# Patient Record
Sex: FEMALE | Race: WHITE | HISPANIC OR LATINO
[De-identification: names, ages, dates, MRNs, and addresses within clinical notes are randomized per-mention and may not be internally consistent; named-entity substitution may affect disease eponyms.]

---

## 2017-02-08 ENCOUNTER — APPOINTMENT (OUTPATIENT)
Dept: PODIATRY | Facility: CLINIC | Age: 77
End: 2017-02-08

## 2017-02-14 ENCOUNTER — APPOINTMENT (OUTPATIENT)
Dept: PODIATRY | Facility: CLINIC | Age: 77
End: 2017-02-14

## 2017-02-14 ENCOUNTER — OUTPATIENT (OUTPATIENT)
Dept: OUTPATIENT SERVICES | Facility: HOSPITAL | Age: 77
LOS: 1 days | Discharge: HOME | End: 2017-02-14

## 2017-02-14 VITALS — BODY MASS INDEX: 51.53 KG/M2 | WEIGHT: 280 LBS | HEIGHT: 62 IN

## 2017-02-14 DIAGNOSIS — L29.9 PRURITUS, UNSPECIFIED: ICD-10-CM

## 2017-02-14 DIAGNOSIS — L03.119 CELLULITIS OF UNSPECIFIED PART OF LIMB: ICD-10-CM

## 2017-02-22 ENCOUNTER — APPOINTMENT (OUTPATIENT)
Dept: PODIATRY | Facility: CLINIC | Age: 77
End: 2017-02-22

## 2017-03-08 ENCOUNTER — APPOINTMENT (OUTPATIENT)
Dept: PODIATRY | Facility: CLINIC | Age: 77
End: 2017-03-08

## 2017-03-29 ENCOUNTER — APPOINTMENT (OUTPATIENT)
Dept: PODIATRY | Facility: CLINIC | Age: 77
End: 2017-03-29

## 2017-04-03 ENCOUNTER — APPOINTMENT (OUTPATIENT)
Dept: PODIATRY | Facility: CLINIC | Age: 77
End: 2017-04-03

## 2017-04-03 VITALS
SYSTOLIC BLOOD PRESSURE: 120 MMHG | BODY MASS INDEX: 28.32 KG/M2 | HEIGHT: 65 IN | WEIGHT: 170 LBS | HEART RATE: 70 BPM | DIASTOLIC BLOOD PRESSURE: 88 MMHG

## 2017-04-17 ENCOUNTER — APPOINTMENT (OUTPATIENT)
Dept: PODIATRY | Facility: CLINIC | Age: 77
End: 2017-04-17

## 2017-04-24 ENCOUNTER — APPOINTMENT (OUTPATIENT)
Dept: INTERNAL MEDICINE | Facility: CLINIC | Age: 77
End: 2017-04-24

## 2017-04-24 VITALS
WEIGHT: 280 LBS | DIASTOLIC BLOOD PRESSURE: 85 MMHG | HEIGHT: 62 IN | SYSTOLIC BLOOD PRESSURE: 139 MMHG | HEART RATE: 60 BPM | BODY MASS INDEX: 51.53 KG/M2

## 2017-04-24 RX ORDER — CEPHALEXIN 500 MG/1
500 CAPSULE ORAL
Qty: 20 | Refills: 0 | Status: DISCONTINUED | COMMUNITY
Start: 2017-02-08 | End: 2017-04-24

## 2017-05-09 ENCOUNTER — APPOINTMENT (OUTPATIENT)
Dept: CARDIOLOGY | Facility: CLINIC | Age: 77
End: 2017-05-09

## 2017-05-09 VITALS — HEART RATE: 70 BPM | DIASTOLIC BLOOD PRESSURE: 70 MMHG | OXYGEN SATURATION: 97 % | SYSTOLIC BLOOD PRESSURE: 110 MMHG

## 2017-06-08 ENCOUNTER — APPOINTMENT (OUTPATIENT)
Dept: GERIATRICS | Facility: CLINIC | Age: 77
End: 2017-06-08

## 2017-06-08 VITALS
BODY MASS INDEX: 53.73 KG/M2 | WEIGHT: 292 LBS | HEIGHT: 62 IN | SYSTOLIC BLOOD PRESSURE: 147 MMHG | DIASTOLIC BLOOD PRESSURE: 67 MMHG | HEART RATE: 58 BPM

## 2017-06-08 DIAGNOSIS — R06.01 ORTHOPNEA: ICD-10-CM

## 2017-06-12 ENCOUNTER — APPOINTMENT (OUTPATIENT)
Dept: PODIATRY | Facility: CLINIC | Age: 77
End: 2017-06-12

## 2017-06-12 ENCOUNTER — OUTPATIENT (OUTPATIENT)
Dept: OUTPATIENT SERVICES | Facility: HOSPITAL | Age: 77
LOS: 1 days | Discharge: HOME | End: 2017-06-12

## 2017-06-12 VITALS
SYSTOLIC BLOOD PRESSURE: 140 MMHG | BODY MASS INDEX: 45 KG/M2 | DIASTOLIC BLOOD PRESSURE: 70 MMHG | HEIGHT: 66 IN | HEART RATE: 78 BPM | WEIGHT: 280 LBS

## 2017-06-12 DIAGNOSIS — I50.9 HEART FAILURE, UNSPECIFIED: ICD-10-CM

## 2017-06-12 RX ORDER — SILVER SULFADIAZINE 10 MG/G
1 CREAM TOPICAL TWICE DAILY
Qty: 50 | Refills: 1 | Status: COMPLETED | COMMUNITY
Start: 2017-06-12

## 2017-06-28 DIAGNOSIS — L97.519 NON-PRESSURE CHRONIC ULCER OF OTHER PART OF RIGHT FOOT WITH UNSPECIFIED SEVERITY: ICD-10-CM

## 2017-07-11 ENCOUNTER — APPOINTMENT (OUTPATIENT)
Dept: PULMONOLOGY | Facility: CLINIC | Age: 77
End: 2017-07-11

## 2017-07-11 VITALS
DIASTOLIC BLOOD PRESSURE: 70 MMHG | OXYGEN SATURATION: 98 % | HEART RATE: 60 BPM | RESPIRATION RATE: 18 BRPM | HEIGHT: 66 IN | SYSTOLIC BLOOD PRESSURE: 118 MMHG

## 2017-07-11 VITALS — WEIGHT: 292.8 LBS | HEIGHT: 62 IN | BODY MASS INDEX: 53.88 KG/M2

## 2017-07-12 ENCOUNTER — OUTPATIENT (OUTPATIENT)
Dept: OUTPATIENT SERVICES | Facility: HOSPITAL | Age: 77
LOS: 1 days | Discharge: HOME | End: 2017-07-12

## 2017-07-12 DIAGNOSIS — I50.9 HEART FAILURE, UNSPECIFIED: ICD-10-CM

## 2017-07-13 ENCOUNTER — OUTPATIENT (OUTPATIENT)
Dept: OUTPATIENT SERVICES | Facility: HOSPITAL | Age: 77
LOS: 1 days | Discharge: HOME | End: 2017-07-13

## 2017-07-13 ENCOUNTER — APPOINTMENT (OUTPATIENT)
Dept: GERIATRICS | Facility: CLINIC | Age: 77
End: 2017-07-13

## 2017-07-13 VITALS
DIASTOLIC BLOOD PRESSURE: 65 MMHG | HEART RATE: 69 BPM | SYSTOLIC BLOOD PRESSURE: 129 MMHG | WEIGHT: 293 LBS | BODY MASS INDEX: 53.92 KG/M2 | HEIGHT: 62 IN

## 2017-07-13 DIAGNOSIS — E78.00 PURE HYPERCHOLESTEROLEMIA, UNSPECIFIED: ICD-10-CM

## 2017-07-13 DIAGNOSIS — E66.9 OBESITY, UNSPECIFIED: ICD-10-CM

## 2017-07-13 DIAGNOSIS — I50.9 HEART FAILURE, UNSPECIFIED: ICD-10-CM

## 2017-07-13 DIAGNOSIS — G47.33 OBSTRUCTIVE SLEEP APNEA (ADULT) (PEDIATRIC): ICD-10-CM

## 2017-07-13 DIAGNOSIS — I10 ESSENTIAL (PRIMARY) HYPERTENSION: ICD-10-CM

## 2017-07-13 DIAGNOSIS — I50.32 CHRONIC DIASTOLIC (CONGESTIVE) HEART FAILURE: ICD-10-CM

## 2017-07-14 ENCOUNTER — OUTPATIENT (OUTPATIENT)
Dept: OUTPATIENT SERVICES | Facility: HOSPITAL | Age: 77
LOS: 1 days | Discharge: HOME | End: 2017-07-14

## 2017-07-14 DIAGNOSIS — I50.9 HEART FAILURE, UNSPECIFIED: ICD-10-CM

## 2017-07-18 ENCOUNTER — OUTPATIENT (OUTPATIENT)
Dept: OUTPATIENT SERVICES | Facility: HOSPITAL | Age: 77
LOS: 1 days | Discharge: HOME | End: 2017-07-18

## 2017-07-18 DIAGNOSIS — I50.9 HEART FAILURE, UNSPECIFIED: ICD-10-CM

## 2017-08-04 ENCOUNTER — APPOINTMENT (OUTPATIENT)
Dept: PULMONOLOGY | Facility: CLINIC | Age: 77
End: 2017-08-04

## 2017-08-04 ENCOUNTER — OUTPATIENT (OUTPATIENT)
Dept: OUTPATIENT SERVICES | Facility: HOSPITAL | Age: 77
LOS: 1 days | Discharge: HOME | End: 2017-08-04

## 2017-08-04 VITALS
DIASTOLIC BLOOD PRESSURE: 60 MMHG | HEART RATE: 71 BPM | WEIGHT: 293 LBS | SYSTOLIC BLOOD PRESSURE: 121 MMHG | BODY MASS INDEX: 53.92 KG/M2 | HEIGHT: 62 IN

## 2017-08-04 DIAGNOSIS — I50.9 HEART FAILURE, UNSPECIFIED: ICD-10-CM

## 2017-08-15 ENCOUNTER — APPOINTMENT (OUTPATIENT)
Dept: NUTRITION | Facility: CLINIC | Age: 77
End: 2017-08-15

## 2017-09-12 ENCOUNTER — OUTPATIENT (OUTPATIENT)
Dept: OUTPATIENT SERVICES | Facility: HOSPITAL | Age: 77
LOS: 1 days | Discharge: HOME | End: 2017-09-12

## 2017-09-12 ENCOUNTER — APPOINTMENT (OUTPATIENT)
Dept: CARDIOLOGY | Facility: CLINIC | Age: 77
End: 2017-09-12

## 2017-09-12 VITALS
HEART RATE: 66 BPM | WEIGHT: 292 LBS | HEIGHT: 62 IN | DIASTOLIC BLOOD PRESSURE: 85 MMHG | SYSTOLIC BLOOD PRESSURE: 130 MMHG | BODY MASS INDEX: 53.73 KG/M2

## 2017-09-12 DIAGNOSIS — I50.9 HEART FAILURE, UNSPECIFIED: ICD-10-CM

## 2017-09-14 ENCOUNTER — OUTPATIENT (OUTPATIENT)
Dept: OUTPATIENT SERVICES | Facility: HOSPITAL | Age: 77
LOS: 1 days | Discharge: HOME | End: 2017-09-14

## 2017-09-14 DIAGNOSIS — I50.9 HEART FAILURE, UNSPECIFIED: ICD-10-CM

## 2017-09-15 DIAGNOSIS — G47.33 OBSTRUCTIVE SLEEP APNEA (ADULT) (PEDIATRIC): ICD-10-CM

## 2017-10-12 ENCOUNTER — APPOINTMENT (OUTPATIENT)
Dept: GERIATRICS | Facility: CLINIC | Age: 77
End: 2017-10-12

## 2017-11-07 ENCOUNTER — APPOINTMENT (OUTPATIENT)
Dept: CARDIOLOGY | Facility: CLINIC | Age: 77
End: 2017-11-07

## 2017-11-24 ENCOUNTER — RESULT REVIEW (OUTPATIENT)
Age: 77
End: 2017-11-24

## 2017-11-28 LAB
ALBUMIN SERPL-MCNC: 3.6 G/DL
ALBUMIN/GLOB SERPL: 1.16
ALP SERPL-CCNC: 116 IU/L
ALT SERPL-CCNC: 16 IU/L
ANION GAP SERPL CALC-SCNC: 9 MEQ/L
AST SERPL-CCNC: 17 IU/L
BASOPHILS # BLD: 0.03 TH/MM3
BASOPHILS NFR BLD: 0.4 %
BILIRUB SERPL-MCNC: 0.5 MG/DL
BUN SERPL-MCNC: 38 MG/DL
BUN/CREAT SERPL: 30.9 %
CALCIUM SERPL-MCNC: 9.5 MG/DL
CHLORIDE SERPL-SCNC: 108 MEQ/L
CHOLEST SERPL-MCNC: 124 MG/DL
CO2 SERPL-SCNC: 27 MEQ/L
CREAT SERPL-MCNC: 1.23 MG/DL
DIFFERENTIAL METHOD BLD: NORMAL
EOSINOPHIL # BLD: 0.25 TH/MM3
EOSINOPHIL NFR BLD: 3 %
ERYTHROCYTE [DISTWIDTH] IN BLOOD BY AUTOMATED COUNT: 16.3 %
ESTIMATED AVERGAGE GLUCOSE (NORTH): 131 MG/DL
GFR SERPL CREATININE-BSD FRML MDRD: 42
GLUCOSE SERPL-MCNC: 109 MG/DL
GRANULOCYTES # BLD: 5.35 TH/MM3
GRANULOCYTES NFR BLD: 65 %
HBA1C MFR BLD: 6.2 %
HCT VFR BLD AUTO: 41.4 %
HDLC SERPL-MCNC: 49 MG/DL
HDLC SERPL: 2.53
HGB BLD-MCNC: 12.9 G/DL
IMM GRANULOCYTES # BLD: 0.02 TH/MM3
IMM GRANULOCYTES NFR BLD: 0.2 %
LDLC SERPL DIRECT ASSAY-MCNC: 56 MG/DL
LYMPHOCYTES # BLD: 2.06 TH/MM3
LYMPHOCYTES NFR BLD: 25 %
MCH RBC QN AUTO: 28.2 PG
MCHC RBC AUTO-ENTMCNC: 31.2 G/DL
MCV RBC AUTO: 90.4 FL
MONOCYTES # BLD: 0.53 TH/MM3
MONOCYTES NFR BLD: 6.4 %
PLATELET # BLD: 241 TH/MM3
PMV BLD AUTO: 11.5 FL
POTASSIUM SERPL-SCNC: 4.2 MMOL/L
PROT SERPL-MCNC: 6.7 G/DL
RBC # BLD AUTO: 4.58 MIL/MM3
SODIUM SERPL-SCNC: 144 MEQ/L
TRIGL SERPL-MCNC: 110 MG/DL
VLDLC SERPL-MCNC: 22 MG/DL
WBC # BLD: 8.24 TH/MM3

## 2017-12-05 ENCOUNTER — APPOINTMENT (OUTPATIENT)
Dept: CARDIOLOGY | Facility: CLINIC | Age: 77
End: 2017-12-05

## 2017-12-05 ENCOUNTER — OUTPATIENT (OUTPATIENT)
Dept: OUTPATIENT SERVICES | Facility: HOSPITAL | Age: 77
LOS: 1 days | Discharge: HOME | End: 2017-12-05

## 2017-12-05 VITALS
HEIGHT: 62 IN | HEART RATE: 77 BPM | SYSTOLIC BLOOD PRESSURE: 118 MMHG | BODY MASS INDEX: 53.73 KG/M2 | WEIGHT: 292 LBS | OXYGEN SATURATION: 98 % | DIASTOLIC BLOOD PRESSURE: 80 MMHG

## 2017-12-05 DIAGNOSIS — E78.2 MIXED HYPERLIPIDEMIA: ICD-10-CM

## 2017-12-05 DIAGNOSIS — I50.32 CHRONIC DIASTOLIC (CONGESTIVE) HEART FAILURE: ICD-10-CM

## 2017-12-05 DIAGNOSIS — I50.9 HEART FAILURE, UNSPECIFIED: ICD-10-CM

## 2017-12-07 ENCOUNTER — OUTPATIENT (OUTPATIENT)
Dept: OUTPATIENT SERVICES | Facility: HOSPITAL | Age: 77
LOS: 1 days | Discharge: HOME | End: 2017-12-07

## 2017-12-07 DIAGNOSIS — I50.9 HEART FAILURE, UNSPECIFIED: ICD-10-CM

## 2017-12-07 DIAGNOSIS — I50.32 CHRONIC DIASTOLIC (CONGESTIVE) HEART FAILURE: ICD-10-CM

## 2017-12-20 LAB — CYTOLOGY CVX/VAG DOC THIN PREP: NORMAL

## 2017-12-27 ENCOUNTER — APPOINTMENT (OUTPATIENT)
Dept: GERIATRICS | Facility: CLINIC | Age: 77
End: 2017-12-27

## 2018-01-10 DIAGNOSIS — M79.671 PAIN IN RIGHT FOOT: ICD-10-CM

## 2018-01-10 DIAGNOSIS — L97.519 NON-PRESSURE CHRONIC ULCER OF OTHER PART OF RIGHT FOOT WITH UNSPECIFIED SEVERITY: ICD-10-CM

## 2018-01-10 DIAGNOSIS — R60.0 LOCALIZED EDEMA: ICD-10-CM

## 2018-05-01 ENCOUNTER — OUTPATIENT (OUTPATIENT)
Dept: OUTPATIENT SERVICES | Facility: HOSPITAL | Age: 78
LOS: 1 days | Discharge: HOME | End: 2018-05-01

## 2018-05-01 DIAGNOSIS — H25.13 AGE-RELATED NUCLEAR CATARACT, BILATERAL: ICD-10-CM

## 2018-05-03 ENCOUNTER — APPOINTMENT (OUTPATIENT)
Dept: GERIATRICS | Facility: CLINIC | Age: 78
End: 2018-05-03

## 2018-05-03 ENCOUNTER — OUTPATIENT (OUTPATIENT)
Dept: OUTPATIENT SERVICES | Facility: HOSPITAL | Age: 78
LOS: 1 days | Discharge: HOME | End: 2018-05-03

## 2018-05-03 VITALS
WEIGHT: 293 LBS | HEART RATE: 66 BPM | BODY MASS INDEX: 53.92 KG/M2 | HEIGHT: 62 IN | DIASTOLIC BLOOD PRESSURE: 70 MMHG | SYSTOLIC BLOOD PRESSURE: 137 MMHG

## 2018-05-03 VITALS — TEMPERATURE: 97.9 F

## 2018-05-03 DIAGNOSIS — R32 UNSPECIFIED URINARY INCONTINENCE: ICD-10-CM

## 2018-05-03 RX ORDER — SILVER SULFADIAZINE 10 MG/G
1 CREAM TOPICAL TWICE DAILY
Qty: 5 | Refills: 0 | Status: DISCONTINUED | COMMUNITY
Start: 2017-04-24 | End: 2018-05-03

## 2018-05-07 DIAGNOSIS — R06.09 OTHER FORMS OF DYSPNEA: ICD-10-CM

## 2018-05-07 DIAGNOSIS — R06.02 SHORTNESS OF BREATH: ICD-10-CM

## 2018-05-07 DIAGNOSIS — I50.31 ACUTE DIASTOLIC (CONGESTIVE) HEART FAILURE: ICD-10-CM

## 2018-05-07 DIAGNOSIS — M19.90 UNSPECIFIED OSTEOARTHRITIS, UNSPECIFIED SITE: ICD-10-CM

## 2018-05-12 ENCOUNTER — LABORATORY RESULT (OUTPATIENT)
Age: 78
End: 2018-05-12

## 2018-05-15 ENCOUNTER — APPOINTMENT (OUTPATIENT)
Dept: CARDIOLOGY | Facility: CLINIC | Age: 78
End: 2018-05-15

## 2018-05-15 ENCOUNTER — OUTPATIENT (OUTPATIENT)
Dept: OUTPATIENT SERVICES | Facility: HOSPITAL | Age: 78
LOS: 1 days | Discharge: HOME | End: 2018-05-15

## 2018-05-15 VITALS
HEART RATE: 72 BPM | WEIGHT: 293 LBS | DIASTOLIC BLOOD PRESSURE: 78 MMHG | BODY MASS INDEX: 53.92 KG/M2 | HEIGHT: 62 IN | SYSTOLIC BLOOD PRESSURE: 120 MMHG | OXYGEN SATURATION: 99 %

## 2018-05-15 RX ORDER — ATORVASTATIN CALCIUM 20 MG/1
20 TABLET, FILM COATED ORAL
Qty: 90 | Refills: 3 | Status: DISCONTINUED | COMMUNITY
Start: 2017-07-13 | End: 2018-05-15

## 2018-05-16 LAB
ANION GAP SERPL CALC-SCNC: 14 MMOL/L
BASOPHILS # BLD AUTO: 0.03 K/UL
BASOPHILS NFR BLD AUTO: 0.5 %
BUN SERPL-MCNC: 32 MG/DL
CALCIUM SERPL-MCNC: 9 MG/DL
CHLORIDE SERPL-SCNC: 102 MMOL/L
CHOLEST SERPL-MCNC: 149 MG/DL
CHOLEST/HDLC SERPL: 2.6 RATIO
CO2 SERPL-SCNC: 25 MMOL/L
CREAT SERPL-MCNC: 1.1 MG/DL
EOSINOPHIL # BLD AUTO: 0.18 K/UL
EOSINOPHIL NFR BLD AUTO: 2.9 %
GLUCOSE SERPL-MCNC: 85 MG/DL
HBA1C MFR BLD HPLC: 6 %
HCT VFR BLD CALC: 39.2 %
HDLC SERPL-MCNC: 58 MG/DL
HGB BLD-MCNC: 11.9 G/DL
IMM GRANULOCYTES NFR BLD AUTO: 0.2 %
LDLC SERPL CALC-MCNC: 72 MG/DL
LYMPHOCYTES # BLD AUTO: 1.39 K/UL
LYMPHOCYTES NFR BLD AUTO: 22.4 %
MAN DIFF?: NORMAL
MCHC RBC-ENTMCNC: 27.4 PG
MCHC RBC-ENTMCNC: 30.4 G/DL
MCV RBC AUTO: 90.3 FL
MONOCYTES # BLD AUTO: 0.45 K/UL
MONOCYTES NFR BLD AUTO: 7.3 %
NEUTROPHILS # BLD AUTO: 4.14 K/UL
NEUTROPHILS NFR BLD AUTO: 66.7 %
PLATELET # BLD AUTO: 237 K/UL
POTASSIUM SERPL-SCNC: 4.6 MMOL/L
RBC # BLD: 4.34 M/UL
RBC # FLD: 17.5 %
SODIUM SERPL-SCNC: 141 MMOL/L
TRIGL SERPL-MCNC: 120 MG/DL
TSH SERPL-ACNC: 11.04 UIU/ML
WBC # FLD AUTO: 6.2 K/UL

## 2018-06-23 ENCOUNTER — LABORATORY RESULT (OUTPATIENT)
Age: 78
End: 2018-06-23

## 2018-07-02 ENCOUNTER — TRANSCRIPTION ENCOUNTER (OUTPATIENT)
Age: 78
End: 2018-07-02

## 2018-07-05 LAB
T3REVERSE SERPL-MCNC: 16.8 NG/DL
T4BG SERPL-MCNC: 20 UG/ML
THYROPEROXIDASE AB SERPL IA-ACNC: 8042 IU/ML
TSH SERPL-ACNC: 10.8 UIU/ML

## 2018-07-09 ENCOUNTER — APPOINTMENT (OUTPATIENT)
Dept: PODIATRY | Facility: CLINIC | Age: 78
End: 2018-07-09
Payer: MEDICAID

## 2018-07-09 ENCOUNTER — OUTPATIENT (OUTPATIENT)
Dept: OUTPATIENT SERVICES | Facility: HOSPITAL | Age: 78
LOS: 1 days | Discharge: HOME | End: 2018-07-09

## 2018-07-09 VITALS
SYSTOLIC BLOOD PRESSURE: 128 MMHG | WEIGHT: 293 LBS | DIASTOLIC BLOOD PRESSURE: 73 MMHG | RESPIRATION RATE: 20 BRPM | BODY MASS INDEX: 53.92 KG/M2 | HEIGHT: 62 IN | HEART RATE: 65 BPM

## 2018-07-09 PROCEDURE — 99213 OFFICE O/P EST LOW 20 MIN: CPT | Mod: NC

## 2018-07-17 ENCOUNTER — OUTPATIENT (OUTPATIENT)
Dept: OUTPATIENT SERVICES | Facility: HOSPITAL | Age: 78
LOS: 1 days | Discharge: HOME | End: 2018-07-17

## 2018-07-17 ENCOUNTER — APPOINTMENT (OUTPATIENT)
Dept: BURN CARE | Facility: CLINIC | Age: 78
End: 2018-07-17

## 2018-07-23 ENCOUNTER — APPOINTMENT (OUTPATIENT)
Dept: VASCULAR SURGERY | Facility: CLINIC | Age: 78
End: 2018-07-23
Payer: MEDICAID

## 2018-07-23 VITALS
HEIGHT: 61 IN | BODY MASS INDEX: 55.32 KG/M2 | SYSTOLIC BLOOD PRESSURE: 130 MMHG | WEIGHT: 293 LBS | DIASTOLIC BLOOD PRESSURE: 82 MMHG

## 2018-07-23 PROCEDURE — 99203 OFFICE O/P NEW LOW 30 MIN: CPT

## 2018-07-25 DIAGNOSIS — M79.672 PAIN IN LEFT FOOT: ICD-10-CM

## 2018-07-25 DIAGNOSIS — M79.671 PAIN IN RIGHT FOOT: ICD-10-CM

## 2018-07-25 DIAGNOSIS — I87.2 VENOUS INSUFFICIENCY (CHRONIC) (PERIPHERAL): ICD-10-CM

## 2018-07-30 ENCOUNTER — APPOINTMENT (OUTPATIENT)
Dept: VASCULAR SURGERY | Facility: CLINIC | Age: 78
End: 2018-07-30
Payer: MEDICAID

## 2018-07-30 PROCEDURE — 29580 STRAPPING UNNA BOOT: CPT

## 2018-08-02 ENCOUNTER — APPOINTMENT (OUTPATIENT)
Dept: GERIATRICS | Facility: CLINIC | Age: 78
End: 2018-08-02

## 2018-08-02 ENCOUNTER — OUTPATIENT (OUTPATIENT)
Dept: OUTPATIENT SERVICES | Facility: HOSPITAL | Age: 78
LOS: 1 days | Discharge: HOME | End: 2018-08-02

## 2018-08-02 VITALS
DIASTOLIC BLOOD PRESSURE: 83 MMHG | TEMPERATURE: 97.3 F | HEIGHT: 61 IN | WEIGHT: 293 LBS | HEART RATE: 60 BPM | SYSTOLIC BLOOD PRESSURE: 124 MMHG | BODY MASS INDEX: 55.32 KG/M2

## 2018-08-06 ENCOUNTER — APPOINTMENT (OUTPATIENT)
Dept: VASCULAR SURGERY | Facility: CLINIC | Age: 78
End: 2018-08-06
Payer: MEDICAID

## 2018-08-06 ENCOUNTER — APPOINTMENT (OUTPATIENT)
Dept: PODIATRY | Facility: CLINIC | Age: 78
End: 2018-08-06
Payer: MEDICAID

## 2018-08-06 ENCOUNTER — OUTPATIENT (OUTPATIENT)
Dept: OUTPATIENT SERVICES | Facility: HOSPITAL | Age: 78
LOS: 1 days | Discharge: HOME | End: 2018-08-06

## 2018-08-06 VITALS
HEART RATE: 71 BPM | DIASTOLIC BLOOD PRESSURE: 75 MMHG | BODY MASS INDEX: 55.32 KG/M2 | SYSTOLIC BLOOD PRESSURE: 130 MMHG | HEIGHT: 61 IN | WEIGHT: 293 LBS

## 2018-08-06 PROCEDURE — 29580 STRAPPING UNNA BOOT: CPT | Mod: NC

## 2018-08-06 PROCEDURE — 99212 OFFICE O/P EST SF 10 MIN: CPT

## 2018-08-13 ENCOUNTER — APPOINTMENT (OUTPATIENT)
Dept: VASCULAR SURGERY | Facility: CLINIC | Age: 78
End: 2018-08-13

## 2018-08-16 ENCOUNTER — APPOINTMENT (OUTPATIENT)
Dept: PODIATRY | Facility: CLINIC | Age: 78
End: 2018-08-16
Payer: MEDICAID

## 2018-08-16 ENCOUNTER — OUTPATIENT (OUTPATIENT)
Dept: OUTPATIENT SERVICES | Facility: HOSPITAL | Age: 78
LOS: 1 days | Discharge: HOME | End: 2018-08-16

## 2018-08-16 VITALS
DIASTOLIC BLOOD PRESSURE: 83 MMHG | HEIGHT: 61 IN | WEIGHT: 293 LBS | BODY MASS INDEX: 55.32 KG/M2 | HEART RATE: 60 BPM | SYSTOLIC BLOOD PRESSURE: 141 MMHG

## 2018-08-16 PROCEDURE — 29580 STRAPPING UNNA BOOT: CPT | Mod: NC

## 2018-08-23 ENCOUNTER — APPOINTMENT (OUTPATIENT)
Dept: PODIATRY | Facility: CLINIC | Age: 78
End: 2018-08-23
Payer: MEDICAID

## 2018-08-23 ENCOUNTER — OUTPATIENT (OUTPATIENT)
Dept: OUTPATIENT SERVICES | Facility: HOSPITAL | Age: 78
LOS: 1 days | Discharge: HOME | End: 2018-08-23

## 2018-08-23 VITALS
BODY MASS INDEX: 55.32 KG/M2 | HEART RATE: 65 BPM | SYSTOLIC BLOOD PRESSURE: 137 MMHG | DIASTOLIC BLOOD PRESSURE: 90 MMHG | WEIGHT: 293 LBS | HEIGHT: 61 IN

## 2018-08-23 PROCEDURE — 29580 STRAPPING UNNA BOOT: CPT | Mod: NC

## 2018-08-30 ENCOUNTER — OUTPATIENT (OUTPATIENT)
Dept: OUTPATIENT SERVICES | Facility: HOSPITAL | Age: 78
LOS: 1 days | Discharge: HOME | End: 2018-08-30

## 2018-08-30 ENCOUNTER — APPOINTMENT (OUTPATIENT)
Dept: PODIATRY | Facility: CLINIC | Age: 78
End: 2018-08-30
Payer: MEDICAID

## 2018-08-30 VITALS — BODY MASS INDEX: 55.32 KG/M2 | WEIGHT: 293 LBS | HEIGHT: 61 IN

## 2018-08-30 VITALS — HEART RATE: 65 BPM | SYSTOLIC BLOOD PRESSURE: 116 MMHG | DIASTOLIC BLOOD PRESSURE: 80 MMHG

## 2018-08-30 DIAGNOSIS — L85.3 XEROSIS CUTIS: ICD-10-CM

## 2018-08-30 DIAGNOSIS — B35.1 TINEA UNGUIUM: ICD-10-CM

## 2018-08-30 PROCEDURE — 29580 STRAPPING UNNA BOOT: CPT | Mod: NC

## 2018-08-31 PROBLEM — L85.3 XEROSIS OF SKIN: Status: ACTIVE | Noted: 2018-08-30

## 2018-08-31 PROBLEM — B35.1 ONYCHOMYCOSIS: Status: ACTIVE | Noted: 2018-08-30

## 2018-09-06 ENCOUNTER — OUTPATIENT (OUTPATIENT)
Facility: HOSPITAL | Age: 78
LOS: 1 days | Discharge: HOME | End: 2018-09-06

## 2018-09-06 ENCOUNTER — APPOINTMENT (OUTPATIENT)
Dept: PODIATRY | Facility: CLINIC | Age: 78
End: 2018-09-06
Payer: MEDICAID

## 2018-09-06 VITALS
HEART RATE: 71 BPM | WEIGHT: 293 LBS | DIASTOLIC BLOOD PRESSURE: 67 MMHG | SYSTOLIC BLOOD PRESSURE: 130 MMHG | BODY MASS INDEX: 55.32 KG/M2 | HEIGHT: 61 IN

## 2018-09-06 VITALS — BODY MASS INDEX: 55.32 KG/M2 | WEIGHT: 293 LBS | HEIGHT: 61 IN

## 2018-09-06 DIAGNOSIS — I83.013 VARICOSE VEINS OF RIGHT LOWER EXTREMITY WITH ULCER OF ANKLE: ICD-10-CM

## 2018-09-06 DIAGNOSIS — I73.9 PERIPHERAL VASCULAR DISEASE, UNSPECIFIED: ICD-10-CM

## 2018-09-06 DIAGNOSIS — I83.009 VARICOSE VEINS OF UNSPECIFIED LOWER EXTREMITY WITH ULCER OF UNSPECIFIED SITE: ICD-10-CM

## 2018-09-06 DIAGNOSIS — L97.519 NON-PRESSURE CHRONIC ULCER OF OTHER PART OF RIGHT FOOT WITH UNSPECIFIED SEVERITY: ICD-10-CM

## 2018-09-06 DIAGNOSIS — I89.0 LYMPHEDEMA, NOT ELSEWHERE CLASSIFIED: ICD-10-CM

## 2018-09-06 PROCEDURE — 29580 STRAPPING UNNA BOOT: CPT | Mod: NC

## 2018-09-07 DIAGNOSIS — B35.1 TINEA UNGUIUM: ICD-10-CM

## 2018-09-07 DIAGNOSIS — L97.519 NON-PRESSURE CHRONIC ULCER OF OTHER PART OF RIGHT FOOT WITH UNSPECIFIED SEVERITY: ICD-10-CM

## 2018-09-07 DIAGNOSIS — L85.3 XEROSIS CUTIS: ICD-10-CM

## 2018-09-13 ENCOUNTER — OUTPATIENT (OUTPATIENT)
Dept: OUTPATIENT SERVICES | Facility: HOSPITAL | Age: 78
LOS: 1 days | Discharge: HOME | End: 2018-09-13

## 2018-09-13 ENCOUNTER — APPOINTMENT (OUTPATIENT)
Dept: PODIATRY | Facility: CLINIC | Age: 78
End: 2018-09-13
Payer: MEDICAID

## 2018-09-13 VITALS
WEIGHT: 293 LBS | DIASTOLIC BLOOD PRESSURE: 85 MMHG | SYSTOLIC BLOOD PRESSURE: 127 MMHG | BODY MASS INDEX: 55.32 KG/M2 | HEART RATE: 70 BPM | HEIGHT: 61 IN

## 2018-09-13 PROCEDURE — 29580 STRAPPING UNNA BOOT: CPT

## 2018-09-24 DIAGNOSIS — I83.009 VARICOSE VEINS OF UNSPECIFIED LOWER EXTREMITY WITH ULCER OF UNSPECIFIED SITE: ICD-10-CM

## 2018-09-24 DIAGNOSIS — L97.519 NON-PRESSURE CHRONIC ULCER OF OTHER PART OF RIGHT FOOT WITH UNSPECIFIED SEVERITY: ICD-10-CM

## 2018-09-24 DIAGNOSIS — M79.671 PAIN IN RIGHT FOOT: ICD-10-CM

## 2018-09-25 ENCOUNTER — APPOINTMENT (OUTPATIENT)
Dept: PODIATRY | Facility: CLINIC | Age: 78
End: 2018-09-25
Payer: MEDICAID

## 2018-09-25 ENCOUNTER — OUTPATIENT (OUTPATIENT)
Dept: OUTPATIENT SERVICES | Facility: HOSPITAL | Age: 78
LOS: 1 days | Discharge: HOME | End: 2018-09-25

## 2018-09-25 VITALS — WEIGHT: 293 LBS | BODY MASS INDEX: 55.32 KG/M2 | HEIGHT: 61 IN

## 2018-09-25 PROCEDURE — 29580 STRAPPING UNNA BOOT: CPT | Mod: NC

## 2018-09-25 PROCEDURE — 29581 APPL MULTLAYER CMPRN SYS LEG: CPT | Mod: NC,59

## 2018-10-01 ENCOUNTER — APPOINTMENT (OUTPATIENT)
Dept: PODIATRY | Facility: CLINIC | Age: 78
End: 2018-10-01

## 2018-10-01 ENCOUNTER — OUTPATIENT (OUTPATIENT)
Dept: OUTPATIENT SERVICES | Facility: HOSPITAL | Age: 78
LOS: 1 days | Discharge: HOME | End: 2018-10-01

## 2018-10-01 VITALS
SYSTOLIC BLOOD PRESSURE: 70 MMHG | HEART RATE: 89 BPM | DIASTOLIC BLOOD PRESSURE: 45 MMHG | WEIGHT: 293 LBS | BODY MASS INDEX: 55.32 KG/M2 | HEIGHT: 61 IN

## 2018-10-02 DIAGNOSIS — M79.671 PAIN IN RIGHT FOOT: ICD-10-CM

## 2018-10-02 DIAGNOSIS — I83.009 VARICOSE VEINS OF UNSPECIFIED LOWER EXTREMITY WITH ULCER OF UNSPECIFIED SITE: ICD-10-CM

## 2018-10-02 DIAGNOSIS — I73.9 PERIPHERAL VASCULAR DISEASE, UNSPECIFIED: ICD-10-CM

## 2018-10-02 DIAGNOSIS — I83.013 VARICOSE VEINS OF RIGHT LOWER EXTREMITY WITH ULCER OF ANKLE: ICD-10-CM

## 2018-10-02 DIAGNOSIS — L97.519 NON-PRESSURE CHRONIC ULCER OF OTHER PART OF RIGHT FOOT WITH UNSPECIFIED SEVERITY: ICD-10-CM

## 2018-10-04 ENCOUNTER — APPOINTMENT (OUTPATIENT)
Dept: GERIATRICS | Facility: CLINIC | Age: 78
End: 2018-10-04

## 2018-10-04 ENCOUNTER — OUTPATIENT (OUTPATIENT)
Dept: OUTPATIENT SERVICES | Facility: HOSPITAL | Age: 78
LOS: 1 days | Discharge: HOME | End: 2018-10-04

## 2018-10-04 VITALS
DIASTOLIC BLOOD PRESSURE: 78 MMHG | BODY MASS INDEX: 55.32 KG/M2 | HEART RATE: 59 BPM | HEIGHT: 61 IN | WEIGHT: 293 LBS | SYSTOLIC BLOOD PRESSURE: 131 MMHG

## 2018-10-04 DIAGNOSIS — M25.511 PAIN IN RIGHT SHOULDER: ICD-10-CM

## 2018-10-04 RX ORDER — MELOXICAM 15 MG/1
15 TABLET ORAL
Qty: 30 | Refills: 0 | Status: DISCONTINUED | COMMUNITY
Start: 2018-08-16 | End: 2018-10-04

## 2018-10-05 NOTE — ASU PATIENT PROFILE, ADULT - PT NEEDS ASSIST
Was the patient seen in the last year in this department? Yes     Does patient have an active prescription for medications requested? No     Received Request Via: Pharmacy      
no

## 2018-10-08 ENCOUNTER — OUTPATIENT (OUTPATIENT)
Dept: OUTPATIENT SERVICES | Facility: HOSPITAL | Age: 78
LOS: 1 days | Discharge: HOME | End: 2018-10-08

## 2018-10-08 VITALS
SYSTOLIC BLOOD PRESSURE: 112 MMHG | HEIGHT: 65 IN | RESPIRATION RATE: 18 BRPM | DIASTOLIC BLOOD PRESSURE: 55 MMHG | HEART RATE: 70 BPM | TEMPERATURE: 97 F | WEIGHT: 293 LBS | OXYGEN SATURATION: 96 %

## 2018-10-08 VITALS — SYSTOLIC BLOOD PRESSURE: 117 MMHG | HEART RATE: 64 BPM | RESPIRATION RATE: 15 BRPM | DIASTOLIC BLOOD PRESSURE: 56 MMHG

## 2018-10-09 ENCOUNTER — APPOINTMENT (OUTPATIENT)
Dept: PODIATRY | Facility: CLINIC | Age: 78
End: 2018-10-09

## 2018-10-09 DIAGNOSIS — I83.009 VARICOSE VEINS OF UNSPECIFIED LOWER EXTREMITY WITH ULCER OF UNSPECIFIED SITE: ICD-10-CM

## 2018-10-09 DIAGNOSIS — L97.519 NON-PRESSURE CHRONIC ULCER OF OTHER PART OF RIGHT FOOT WITH UNSPECIFIED SEVERITY: ICD-10-CM

## 2018-10-09 DIAGNOSIS — I73.9 PERIPHERAL VASCULAR DISEASE, UNSPECIFIED: ICD-10-CM

## 2018-10-09 DIAGNOSIS — M79.671 PAIN IN RIGHT FOOT: ICD-10-CM

## 2018-10-09 DIAGNOSIS — I89.0 LYMPHEDEMA, NOT ELSEWHERE CLASSIFIED: ICD-10-CM

## 2018-10-10 DIAGNOSIS — I50.9 HEART FAILURE, UNSPECIFIED: ICD-10-CM

## 2018-10-10 DIAGNOSIS — Z23 ENCOUNTER FOR IMMUNIZATION: ICD-10-CM

## 2018-10-10 DIAGNOSIS — I10 ESSENTIAL (PRIMARY) HYPERTENSION: ICD-10-CM

## 2018-10-10 DIAGNOSIS — I50.32 CHRONIC DIASTOLIC (CONGESTIVE) HEART FAILURE: ICD-10-CM

## 2018-10-16 ENCOUNTER — FORM ENCOUNTER (OUTPATIENT)
Age: 78
End: 2018-10-16

## 2018-10-16 DIAGNOSIS — E66.9 OBESITY, UNSPECIFIED: ICD-10-CM

## 2018-10-16 DIAGNOSIS — H26.9 UNSPECIFIED CATARACT: ICD-10-CM

## 2018-10-16 DIAGNOSIS — I50.9 HEART FAILURE, UNSPECIFIED: ICD-10-CM

## 2018-10-16 DIAGNOSIS — H25.012 CORTICAL AGE-RELATED CATARACT, LEFT EYE: ICD-10-CM

## 2018-10-16 DIAGNOSIS — I11.0 HYPERTENSIVE HEART DISEASE WITH HEART FAILURE: ICD-10-CM

## 2018-10-16 DIAGNOSIS — E11.36 TYPE 2 DIABETES MELLITUS WITH DIABETIC CATARACT: ICD-10-CM

## 2018-10-17 ENCOUNTER — OUTPATIENT (OUTPATIENT)
Dept: OUTPATIENT SERVICES | Facility: HOSPITAL | Age: 78
LOS: 1 days | Discharge: HOME | End: 2018-10-17

## 2018-10-17 DIAGNOSIS — M25.511 PAIN IN RIGHT SHOULDER: ICD-10-CM

## 2018-10-17 PROBLEM — Z86.79 PERSONAL HISTORY OF OTHER DISEASES OF THE CIRCULATORY SYSTEM: Chronic | Status: ACTIVE | Noted: 2018-10-08

## 2018-11-09 ENCOUNTER — LABORATORY RESULT (OUTPATIENT)
Age: 78
End: 2018-11-09

## 2018-11-12 LAB
25(OH)D3 SERPL-MCNC: 16 NG/ML
ALBUMIN SERPL ELPH-MCNC: 3.8 G/DL
ALP BLD-CCNC: 122 U/L
ALT SERPL-CCNC: 10 U/L
ANION GAP SERPL CALC-SCNC: 13 MMOL/L
AST SERPL-CCNC: 16 U/L
BASOPHILS # BLD AUTO: 0.03 K/UL
BASOPHILS NFR BLD AUTO: 0.4 %
BILIRUB SERPL-MCNC: 0.3 MG/DL
BUN SERPL-MCNC: 21 MG/DL
CALCIUM SERPL-MCNC: 9.1 MG/DL
CHLORIDE SERPL-SCNC: 105 MMOL/L
CHOLEST SERPL-MCNC: 157 MG/DL
CHOLEST/HDLC SERPL: 2.7 RATIO
CO2 SERPL-SCNC: 27 MMOL/L
CREAT SERPL-MCNC: 1 MG/DL
EOSINOPHIL # BLD AUTO: 0.23 K/UL
EOSINOPHIL NFR BLD AUTO: 3.1 %
ESTIMATED AVERAGE GLUCOSE: 111 MG/DL
GLUCOSE SERPL-MCNC: 90 MG/DL
HBA1C MFR BLD HPLC: 5.5 %
HCT VFR BLD CALC: 38.6 %
HDLC SERPL-MCNC: 59 MG/DL
HGB BLD-MCNC: 11.8 G/DL
IMM GRANULOCYTES NFR BLD AUTO: 0.4 %
LDLC SERPL CALC-MCNC: 84 MG/DL
LYMPHOCYTES # BLD AUTO: 1.71 K/UL
LYMPHOCYTES NFR BLD AUTO: 22.9 %
MAN DIFF?: NORMAL
MCHC RBC-ENTMCNC: 27.3 PG
MCHC RBC-ENTMCNC: 30.6 G/DL
MCV RBC AUTO: 89.1 FL
MONOCYTES # BLD AUTO: 0.59 K/UL
MONOCYTES NFR BLD AUTO: 7.9 %
NEUTROPHILS # BLD AUTO: 4.88 K/UL
NEUTROPHILS NFR BLD AUTO: 65.3 %
PLATELET # BLD AUTO: 244 K/UL
POTASSIUM SERPL-SCNC: 4.6 MMOL/L
PROT SERPL-MCNC: 6.9 G/DL
RBC # BLD: 4.33 M/UL
RBC # FLD: 16.5 %
SODIUM SERPL-SCNC: 145 MMOL/L
T3FREE SERPL-MCNC: 2.6 PG/ML
TRIGL SERPL-MCNC: 102 MG/DL
TSH SERPL-ACNC: 7.8 UIU/ML
WBC # FLD AUTO: 7.47 K/UL

## 2018-11-13 ENCOUNTER — OUTPATIENT (OUTPATIENT)
Dept: OUTPATIENT SERVICES | Facility: HOSPITAL | Age: 78
LOS: 1 days | Discharge: HOME | End: 2018-11-13

## 2018-11-13 ENCOUNTER — APPOINTMENT (OUTPATIENT)
Dept: CARDIOLOGY | Facility: CLINIC | Age: 78
End: 2018-11-13

## 2018-11-13 VITALS
HEART RATE: 68 BPM | SYSTOLIC BLOOD PRESSURE: 148 MMHG | DIASTOLIC BLOOD PRESSURE: 70 MMHG | WEIGHT: 293 LBS | BODY MASS INDEX: 55.32 KG/M2 | HEIGHT: 61 IN

## 2018-11-13 NOTE — HISTORY OF PRESENT ILLNESS
[FreeTextEntry1] : Routine f/u for DCHF - did NOT increase Lasix to 40 q12\par Labs reviewed - lipids at goal\par Previously on Lasix 40 AM and 20 PM\par \par NST 12/7/2017 - Normal\par \par Severe MAUREEN - did not get CPAP machine yet\par \par Severe bilateral knee arthritis\par

## 2018-11-13 NOTE — PHYSICAL EXAM
[General Appearance - Well Developed] : well developed [General Appearance - In No Acute Distress] : no acute distress [Normal Conjunctiva] : the conjunctiva exhibited no abnormalities [Eyelids - No Xanthelasma] : the eyelids demonstrated no xanthelasmas [Respiration, Rhythm And Depth] : normal respiratory rhythm and effort [Auscultation Breath Sounds / Voice Sounds] : lungs were clear to auscultation bilaterally [Heart Rate And Rhythm] : heart rate and rhythm were normal [Heart Sounds] : normal S1 and S2 [Murmurs] : no murmurs present [Bowel Sounds] : normal bowel sounds [Abdomen Soft] : soft [Abdomen Tenderness] : non-tender [] : no hepato-splenomegaly [Oriented To Time, Place, And Person] : oriented to person, place, and time [FreeTextEntry1] : 1+ edema with chronic right foot ulcer

## 2018-11-13 NOTE — REASON FOR VISIT
[Follow-Up - Clinic] : a clinic follow-up of [Dyspnea] : dyspnea [Heart Failure] : congestive heart failure [Hypertension] : hypertension

## 2018-11-15 ENCOUNTER — APPOINTMENT (OUTPATIENT)
Dept: GERIATRICS | Facility: CLINIC | Age: 78
End: 2018-11-15

## 2018-11-15 ENCOUNTER — OUTPATIENT (OUTPATIENT)
Dept: OUTPATIENT SERVICES | Facility: HOSPITAL | Age: 78
LOS: 1 days | Discharge: HOME | End: 2018-11-15

## 2018-11-15 RX ORDER — AMMONIUM LACTATE 12 %
12 CREAM (GRAM) TOPICAL TWICE DAILY
Qty: 1 | Refills: 3 | Status: DISCONTINUED | COMMUNITY
Start: 2018-08-30 | End: 2018-11-15

## 2018-11-15 RX ORDER — DIAPER,BRIEF,ADULT, DISPOSABLE
EACH MISCELLANEOUS
Qty: 6 | Refills: 3 | Status: DISCONTINUED | COMMUNITY
Start: 2018-05-03 | End: 2018-11-15

## 2018-11-15 RX ORDER — LEVOTHYROXINE SODIUM 25 UG/1
25 TABLET ORAL DAILY
Qty: 30 | Refills: 1 | Status: DISCONTINUED | COMMUNITY
Start: 2018-10-04 | End: 2018-11-15

## 2018-11-15 RX ORDER — CELECOXIB 200 MG/1
200 CAPSULE ORAL DAILY
Qty: 30 | Refills: 1 | Status: DISCONTINUED | COMMUNITY
Start: 2018-10-04 | End: 2018-11-15

## 2018-11-15 RX ORDER — ATORVASTATIN CALCIUM 20 MG/1
20 TABLET, FILM COATED ORAL
Qty: 90 | Refills: 1 | Status: DISCONTINUED | COMMUNITY
End: 2018-11-15

## 2018-11-15 RX ORDER — FUROSEMIDE 20 MG/1
20 TABLET ORAL TWICE DAILY
Qty: 180 | Refills: 1 | Status: DISCONTINUED | COMMUNITY
Start: 2017-04-24 | End: 2018-11-15

## 2018-11-19 ENCOUNTER — APPOINTMENT (OUTPATIENT)
Dept: PODIATRY | Facility: CLINIC | Age: 78
End: 2018-11-19

## 2018-11-19 ENCOUNTER — OUTPATIENT (OUTPATIENT)
Dept: OUTPATIENT SERVICES | Facility: HOSPITAL | Age: 78
LOS: 1 days | Discharge: HOME | End: 2018-11-19

## 2018-11-19 VITALS
RESPIRATION RATE: 17 BRPM | SYSTOLIC BLOOD PRESSURE: 107 MMHG | HEIGHT: 66 IN | OXYGEN SATURATION: 100 % | DIASTOLIC BLOOD PRESSURE: 59 MMHG | TEMPERATURE: 96 F | WEIGHT: 293 LBS | HEART RATE: 77 BPM

## 2018-11-19 VITALS — HEART RATE: 68 BPM | RESPIRATION RATE: 18 BRPM | SYSTOLIC BLOOD PRESSURE: 110 MMHG | DIASTOLIC BLOOD PRESSURE: 80 MMHG

## 2018-11-19 RX ORDER — ONDANSETRON 8 MG/1
4 TABLET, FILM COATED ORAL ONCE
Qty: 0 | Refills: 0 | Status: DISCONTINUED | OUTPATIENT
Start: 2018-11-19 | End: 2018-12-04

## 2018-11-19 RX ORDER — ACETAMINOPHEN 500 MG
650 TABLET ORAL ONCE
Qty: 0 | Refills: 0 | Status: DISCONTINUED | OUTPATIENT
Start: 2018-11-19 | End: 2018-12-04

## 2018-11-19 NOTE — ASU PATIENT PROFILE, ADULT - PMH
Abnormal cholesterol test    Acquired cataract    Acquired hypothyroidism    Acute CHF    H/O: HTN (hypertension)    History of chronic CHF    Obesity, mild

## 2018-11-19 NOTE — PRE-ANESTHESIA EVALUATION ADULT - NSANTHOSAYNRD_GEN_A_CORE
No. MAUREEN screening performed.  STOP BANG Legend: 0-2 = LOW Risk; 3-4 = INTERMEDIATE Risk; 5-8 = HIGH Risk

## 2018-11-23 DIAGNOSIS — I10 ESSENTIAL (PRIMARY) HYPERTENSION: ICD-10-CM

## 2018-11-23 DIAGNOSIS — H26.8 OTHER SPECIFIED CATARACT: ICD-10-CM

## 2018-11-23 DIAGNOSIS — E03.9 HYPOTHYROIDISM, UNSPECIFIED: ICD-10-CM

## 2018-11-23 DIAGNOSIS — E66.9 OBESITY, UNSPECIFIED: ICD-10-CM

## 2018-11-23 DIAGNOSIS — I50.9 HEART FAILURE, UNSPECIFIED: ICD-10-CM

## 2018-12-11 ENCOUNTER — APPOINTMENT (OUTPATIENT)
Dept: CARDIOLOGY | Facility: CLINIC | Age: 78
End: 2018-12-11

## 2019-03-29 ENCOUNTER — APPOINTMENT (OUTPATIENT)
Dept: PULMONOLOGY | Facility: CLINIC | Age: 79
End: 2019-03-29

## 2019-05-23 PROBLEM — H26.9 UNSPECIFIED CATARACT: Chronic | Status: ACTIVE | Noted: 2018-11-19

## 2019-05-23 PROBLEM — E78.9 DISORDER OF LIPOPROTEIN METABOLISM, UNSPECIFIED: Chronic | Status: ACTIVE | Noted: 2018-11-19

## 2019-05-23 PROBLEM — E03.9 HYPOTHYROIDISM, UNSPECIFIED: Chronic | Status: ACTIVE | Noted: 2018-11-19

## 2019-05-23 PROBLEM — Z86.79 PERSONAL HISTORY OF OTHER DISEASES OF THE CIRCULATORY SYSTEM: Chronic | Status: ACTIVE | Noted: 2018-11-19

## 2019-05-23 PROBLEM — I50.9 HEART FAILURE, UNSPECIFIED: Chronic | Status: ACTIVE | Noted: 2018-11-19

## 2019-05-23 PROBLEM — E66.9 OBESITY, UNSPECIFIED: Chronic | Status: ACTIVE | Noted: 2018-11-19

## 2019-06-06 ENCOUNTER — OUTPATIENT (OUTPATIENT)
Dept: OUTPATIENT SERVICES | Facility: HOSPITAL | Age: 79
LOS: 1 days | Discharge: HOME | End: 2019-06-06

## 2019-06-06 ENCOUNTER — APPOINTMENT (OUTPATIENT)
Dept: GERIATRICS | Facility: CLINIC | Age: 79
End: 2019-06-06
Payer: MEDICAID

## 2019-06-06 VITALS
HEIGHT: 61 IN | TEMPERATURE: 97.1 F | SYSTOLIC BLOOD PRESSURE: 137 MMHG | DIASTOLIC BLOOD PRESSURE: 77 MMHG | WEIGHT: 289 LBS | HEART RATE: 64 BPM | BODY MASS INDEX: 54.56 KG/M2

## 2019-06-06 PROCEDURE — 99204 OFFICE O/P NEW MOD 45 MIN: CPT

## 2019-06-06 RX ADMIN — HYDROCORTISONE 0 %: 25 OINTMENT TOPICAL at 00:00

## 2019-06-06 NOTE — PHYSICAL EXAM
[No Acute Distress] : no acute distress [Well Nourished] : well nourished [Well Developed] : well developed [PERRL] : pupils equal round and reactive to light [Well-Appearing] : well-appearing [Normal Sclera/Conjunctiva] : normal sclera/conjunctiva [EOMI] : extraocular movements intact [Clear to Auscultation] : lungs were clear to auscultation bilaterally [No Respiratory Distress] : no respiratory distress  [Regular Rhythm] : with a regular rhythm [Normal Rate] : normal rate  [No Accessory Muscle Use] : no accessory muscle use [No Murmur] : no murmur heard [Normal S1, S2] : normal S1 and S2

## 2019-06-07 NOTE — HISTORY OF PRESENT ILLNESS
[FreeTextEntry1] : Well visit [de-identified] : 79 yo F pmh HFpEF, new paroxysmal atrial fibrillation, and OA presenting for routine follow up. Was inpatient at this hospital for 8 days in June for "a heart problem" during which time she states she had a colonoscopy that was apparently normal (records not accessible?). Was discharged on no new medications and soon after went to [place], where of note she had a syncopal episode with EKG demonstrating atrial fibrillation - started on amiodarone and Eliquis. Repeat XR of right shoulder demonstrating OA and was started on new medication for pain which has been helping; patient states she will take a look when she gets home and call clinic with ID. No longer on meloxicam. Since then, has been feeling well and no new symptoms. CHF still NYHA class 2 and sleeps with 3 pillows, both stable. Recently lasix increased to 40 mg BID by cardiology.

## 2019-06-07 NOTE — REVIEW OF SYSTEMS
[Fever] : no fever [Chills] : no chills [Fatigue] : no fatigue [Chest Pain] : no chest pain [Palpitations] : no palpitations [Shortness Of Breath] : no shortness of breath [Abdominal Pain] : no abdominal pain [Nausea] : no nausea [Vomiting] : no vomiting

## 2019-06-07 NOTE — ASSESSMENT
[FreeTextEntry1] : 79 y/o F with PMHx of diastolic CHF presents for chronic R shoulder pain and L fifth toe pain and discussion of labwork.\par \par #Atrial fibrillation, paroxysmal\par - C/w amiodarone and Eliquis - sent\par - Cardiology follow up\par \par #R shoulder pain\par - XR R shoulder is negative for fracture but shows degenerative changes\par - PT follow-up (has not gone)\par \par #L foot pain\par - Podiatry following for left foot ulcer\par \par #Onchomycosis \par - Resolved\par \par #Chronic CHF\par - Continue furosemide and lisinopril \par - F/u cardiology\par \par #Hypothyroidism\par - F/u repeat TFT's\par - Continue levothyroxine\par \par Health maintenance\par - Send FIT testing\par - Repeat mammography\par - Return in 3 months for flu vaccine, labwork

## 2019-06-08 DIAGNOSIS — I10 ESSENTIAL (PRIMARY) HYPERTENSION: ICD-10-CM

## 2019-06-08 DIAGNOSIS — I50.9 HEART FAILURE, UNSPECIFIED: ICD-10-CM

## 2019-06-08 DIAGNOSIS — E78.5 HYPERLIPIDEMIA, UNSPECIFIED: ICD-10-CM

## 2019-06-08 DIAGNOSIS — I48.91 UNSPECIFIED ATRIAL FIBRILLATION: ICD-10-CM

## 2019-06-22 ENCOUNTER — LABORATORY RESULT (OUTPATIENT)
Age: 79
End: 2019-06-22

## 2019-06-29 ENCOUNTER — EMERGENCY (EMERGENCY)
Facility: HOSPITAL | Age: 79
LOS: 0 days | Discharge: HOME | End: 2019-06-29
Attending: EMERGENCY MEDICINE | Admitting: EMERGENCY MEDICINE
Payer: MEDICAID

## 2019-06-29 VITALS
RESPIRATION RATE: 20 BRPM | SYSTOLIC BLOOD PRESSURE: 134 MMHG | DIASTOLIC BLOOD PRESSURE: 61 MMHG | HEART RATE: 71 BPM | OXYGEN SATURATION: 98 % | TEMPERATURE: 97 F

## 2019-06-29 VITALS
DIASTOLIC BLOOD PRESSURE: 56 MMHG | OXYGEN SATURATION: 100 % | SYSTOLIC BLOOD PRESSURE: 116 MMHG | HEART RATE: 57 BPM | TEMPERATURE: 98 F | RESPIRATION RATE: 20 BRPM

## 2019-06-29 DIAGNOSIS — Z79.84 LONG TERM (CURRENT) USE OF ORAL HYPOGLYCEMIC DRUGS: ICD-10-CM

## 2019-06-29 DIAGNOSIS — I11.0 HYPERTENSIVE HEART DISEASE WITH HEART FAILURE: ICD-10-CM

## 2019-06-29 DIAGNOSIS — I50.9 HEART FAILURE, UNSPECIFIED: ICD-10-CM

## 2019-06-29 DIAGNOSIS — E78.5 HYPERLIPIDEMIA, UNSPECIFIED: ICD-10-CM

## 2019-06-29 DIAGNOSIS — R06.02 SHORTNESS OF BREATH: ICD-10-CM

## 2019-06-29 DIAGNOSIS — Z79.02 LONG TERM (CURRENT) USE OF ANTITHROMBOTICS/ANTIPLATELETS: ICD-10-CM

## 2019-06-29 DIAGNOSIS — Z79.891 LONG TERM (CURRENT) USE OF OPIATE ANALGESIC: ICD-10-CM

## 2019-06-29 DIAGNOSIS — Z79.82 LONG TERM (CURRENT) USE OF ASPIRIN: ICD-10-CM

## 2019-06-29 LAB
ALBUMIN SERPL ELPH-MCNC: 3.7 G/DL — SIGNIFICANT CHANGE UP (ref 3.5–5.2)
ALP SERPL-CCNC: 107 U/L — SIGNIFICANT CHANGE UP (ref 30–115)
ALT FLD-CCNC: 9 U/L — SIGNIFICANT CHANGE UP (ref 0–41)
ANION GAP SERPL CALC-SCNC: 10 MMOL/L — SIGNIFICANT CHANGE UP (ref 7–14)
AST SERPL-CCNC: 16 U/L — SIGNIFICANT CHANGE UP (ref 0–41)
BASE EXCESS BLDV CALC-SCNC: -0.9 MMOL/L — SIGNIFICANT CHANGE UP (ref -2–2)
BILIRUB SERPL-MCNC: 0.3 MG/DL — SIGNIFICANT CHANGE UP (ref 0.2–1.2)
BUN SERPL-MCNC: 29 MG/DL — HIGH (ref 10–20)
CA-I SERPL-SCNC: 1.19 MMOL/L — SIGNIFICANT CHANGE UP (ref 1.12–1.3)
CALCIUM SERPL-MCNC: 8.8 MG/DL — SIGNIFICANT CHANGE UP (ref 8.5–10.1)
CHLORIDE SERPL-SCNC: 109 MMOL/L — SIGNIFICANT CHANGE UP (ref 98–110)
CO2 SERPL-SCNC: 22 MMOL/L — SIGNIFICANT CHANGE UP (ref 17–32)
CREAT SERPL-MCNC: 1.3 MG/DL — SIGNIFICANT CHANGE UP (ref 0.7–1.5)
GAS PNL BLDV: 140 MMOL/L — SIGNIFICANT CHANGE UP (ref 136–145)
GAS PNL BLDV: SIGNIFICANT CHANGE UP
GLUCOSE SERPL-MCNC: 95 MG/DL — SIGNIFICANT CHANGE UP (ref 70–99)
HCO3 BLDV-SCNC: 26 MMOL/L — SIGNIFICANT CHANGE UP (ref 22–29)
HCT VFR BLD CALC: 35.2 % — LOW (ref 37–47)
HCT VFR BLDA CALC: 34.4 % — SIGNIFICANT CHANGE UP (ref 34–44)
HGB BLD CALC-MCNC: 11.2 G/DL — LOW (ref 14–18)
HGB BLD-MCNC: 10.8 G/DL — LOW (ref 12–16)
LACTATE BLDV-MCNC: 0.8 MMOL/L — SIGNIFICANT CHANGE UP (ref 0.5–1.6)
MCHC RBC-ENTMCNC: 28.1 PG — SIGNIFICANT CHANGE UP (ref 27–31)
MCHC RBC-ENTMCNC: 30.7 G/DL — LOW (ref 32–37)
MCV RBC AUTO: 91.7 FL — SIGNIFICANT CHANGE UP (ref 81–99)
NRBC # BLD: 0 /100 WBCS — SIGNIFICANT CHANGE UP (ref 0–0)
NT-PROBNP SERPL-SCNC: 761 PG/ML — HIGH (ref 0–300)
PCO2 BLDV: 54 MMHG — HIGH (ref 41–51)
PH BLDV: 7.29 — SIGNIFICANT CHANGE UP (ref 7.26–7.43)
PLATELET # BLD AUTO: 198 K/UL — SIGNIFICANT CHANGE UP (ref 130–400)
PO2 BLDV: 33 MMHG — SIGNIFICANT CHANGE UP (ref 20–40)
POTASSIUM BLDV-SCNC: 4.9 MMOL/L — SIGNIFICANT CHANGE UP (ref 3.3–5.6)
POTASSIUM SERPL-MCNC: 5.6 MMOL/L — HIGH (ref 3.5–5)
POTASSIUM SERPL-SCNC: 5.6 MMOL/L — HIGH (ref 3.5–5)
PROT SERPL-MCNC: 6.9 G/DL — SIGNIFICANT CHANGE UP (ref 6–8)
RBC # BLD: 3.84 M/UL — LOW (ref 4.2–5.4)
RBC # FLD: 17.6 % — HIGH (ref 11.5–14.5)
SAO2 % BLDV: 57 % — SIGNIFICANT CHANGE UP
SODIUM SERPL-SCNC: 141 MMOL/L — SIGNIFICANT CHANGE UP (ref 135–146)
TROPONIN T SERPL-MCNC: <0.01 NG/ML — SIGNIFICANT CHANGE UP
WBC # BLD: 6.23 K/UL — SIGNIFICANT CHANGE UP (ref 4.8–10.8)
WBC # FLD AUTO: 6.23 K/UL — SIGNIFICANT CHANGE UP (ref 4.8–10.8)

## 2019-06-29 PROCEDURE — 71045 X-RAY EXAM CHEST 1 VIEW: CPT | Mod: 26

## 2019-06-29 PROCEDURE — 93010 ELECTROCARDIOGRAM REPORT: CPT

## 2019-06-29 PROCEDURE — 99285 EMERGENCY DEPT VISIT HI MDM: CPT

## 2019-06-29 RX ORDER — FUROSEMIDE 40 MG
40 TABLET ORAL ONCE
Refills: 0 | Status: COMPLETED | OUTPATIENT
Start: 2019-06-29 | End: 2019-06-29

## 2019-06-29 RX ORDER — DIPHENHYDRAMINE HCL 50 MG
50 CAPSULE ORAL ONCE
Refills: 0 | Status: COMPLETED | OUTPATIENT
Start: 2019-06-29 | End: 2019-06-29

## 2019-06-29 RX ORDER — HYDROCORTISONE 1 %
1 OINTMENT (GRAM) TOPICAL
Qty: 1 | Refills: 0
Start: 2019-06-29 | End: 2019-07-05

## 2019-06-29 RX ADMIN — Medication 50 MILLIGRAM(S): at 12:54

## 2019-06-29 RX ADMIN — Medication 40 MILLIGRAM(S): at 12:53

## 2019-06-29 NOTE — ED ADULT NURSE NOTE - CAS TRG GEN SKIN COLOR
Normal for race
I will SWITCH the dose or number of times a day I take the medications listed below when I get home from the hospital:    Xarelto 20 mg oral tablet  -- 1 tab(s) by mouth once a day (in the evening)    predniSONE  -- 1 cap(s) by mouth once a day

## 2019-06-29 NOTE — ED PROVIDER NOTE - CARE PROVIDER_API CALL
Annetta Berger)  Internal Medicine  [address] [place] [address]  Phone: [phone]  Fax: [phone]  Follow Up Time:     Ashlyn Burris (DO)  Geriatric Medicine; Internal Medicine  [address] [place] [address]  Phone: [phone]  Fax: [phone]  Follow Up Time:

## 2019-06-29 NOTE — ED PROVIDER NOTE - NS ED ROS FT
Constitutional: No fever, chills, unintended weight loss.  Eyes:  No visual changes, eye pain or discharge.  ENMT:  No hearing changes, pain, no sore throat or runny nose, no difficulty swallowing  Cardiac:  see hpi  Respiratory:  +cough, no respiratory distress. No hemoptysis. No history of asthma or RAD.  GI:  No nausea, vomiting, diarrhea or abdominal pain.  :  No dysuria, frequency or burning.  MS:  No myalgia, muscle weakness, joint pain or back pain.  Neuro:  No headache or weakness.  No LOC.  Skin: see hpi  Endocrine: No history of thyroid disease, +preDM (no meds)

## 2019-06-29 NOTE — ED ADULT TRIAGE NOTE - CHIEF COMPLAINT QUOTE
Patient c/o SOB with productive cough that been getting progressively worse rash to torso and Malaise. Patient denies Fevers chills and CP

## 2019-06-29 NOTE — ED PROVIDER NOTE - CARE PROVIDERS DIRECT ADDRESSES
[email or web address]

## 2019-06-29 NOTE — ED PROVIDER NOTE - PROGRESS NOTE DETAILS
pt reassessed, has voided sev times after lasix, feels much better - all results d/w pt and copies given, pt has incr her lasix in past if symptomatic under advice of Cardio Dr. Berger, will incr from 20mg to 40mg for next few days until her f/u appt with Dr. Berger on Tue 7/2 - strict return precautions discussed, pt and daughter ready to go home

## 2019-06-29 NOTE — ED PROVIDER NOTE - PHYSICAL EXAMINATION
VITAL SIGNS: I have reviewed nursing notes and confirm.  CONSTITUTIONAL: obese elderly f nad  SKIN: scattered urticarial rash over UEs and upper ant chest, with confluent area over L ant shin; skin otherwise warm & dry, no vesicles  HEAD: Normocephalic; atraumatic.  EYES: PERRL, EOM intact; conjunctiva and sclera clear.  ENT: No nasal discharge; mmm, no lesions, airway clear, no stridor/drooling, phonating normally.  NECK: Supple; non tender. No jvd.  CARD: S1, S2 normal; no murmurs, gallops, or rubs. Regular rate and rhythm.  RESP: bibasilar rales; no wheezes or rhonchi.  ABD: Normal bowel sounds; soft; non-distended; non-tender; no hepatosplenomegaly.  BACK: non-tender, no CVAT  EXT: Normal ROM. No clubbing, cyanosis. +pitting edema of distal LEs bl, L=R, sm  ulceration to L distal shin (chronic per pt). DPI.  NEURO: Alert, oriented. Grossly unremarkable. No focal deficits.  PSYCH: Cooperative, appropriate.

## 2019-06-29 NOTE — ED PROVIDER NOTE - CLINICAL SUMMARY MEDICAL DECISION MAKING FREE TEXT BOX
CHF exac - sx improved after iv lasix, ED w/u unremarkable (BNP 6000s) - pt will incr her lasix until f/u with Cardio Dr. Berger in 3d, pt and daughter eager for discharge

## 2019-06-29 NOTE — ED PROVIDER NOTE - OBJECTIVE STATEMENT
78y f h/o chf on lasix 20mg daily, htn, hl, jocelyne, pvd, chronic LE edema, preDM no meds p/w incr RESTREPO x 1 week. Accomp by mild cough. Denies sob @ rest. Also rpts urticarial rash x few days - started as itchy macular area over L ant calf, now with scattered similar areas to UE and upper ant chest. Denies f/c, dizziness, oral lesions, diff swallowing or breathing, wheezing, cp, nvd, abd pain. No new meds or exposures. Went to outside [place] when sx began, was told to use a steroid cream for leg but Rx was never sent to pharmacy. PMD [facility] / Cardio Dr. Annetta Berger - has a Cardio appt scheduled in 3d / Tue 7/2/19.

## 2019-06-29 NOTE — ED PROVIDER NOTE - NSFOLLOWUPINSTRUCTIONS_ED_ALL_ED_FT
Congestive Heart Failure (CHF)    Congestive heart failure is a chronic condition in which the heart has trouble pumping blood. This means your heart does not pump blood efficiently for your body to work well. In some cases of heart failure, fluid may back up into your lungs or you may have swelling (edema) in your lower legs. There are many causes of heart failure including high blood pressure, coronary artery disease, abnormal heart valves, heart muscle disease, lung disease, diabetes, etc. Symptoms include shortness of breath with activity or when lying flat, cough, swelling of the legs, fatigue, or increased urination during the night.     Treatment is aimed at managing the symptoms of heart failure and may include lifestyle changes, medications, or surgical procedures. Take medicines only as directed by your health care provider and do not stop unless instructed to do so. Eat heart-healthy foods with low trans/saturated fats, cholesterol and salt. Weigh yourself every day for early recognition of fluid accumulation.      SEEK IMMEDIATE MEDICAL CARE IF YOU HAVE THE FOLLOWING SYMPTOMS: shortness of breath, change in mental status, chest pain, lightheadedness/dizziness/fainting, or worsening of symptoms including not being able to conduct normal physical activity.

## 2019-06-29 NOTE — ED ADULT NURSE NOTE - NSIMPLEMENTINTERV_GEN_ALL_ED
Implemented All Universal Safety Interventions:  [place] to call system. Call bell, personal items and telephone within reach. Instruct patient to call for assistance. Room bathroom lighting operational. Non-slip footwear when patient is off stretcher. Physically safe environment: no spills, clutter or unnecessary equipment. Stretcher in lowest position, wheels locked, appropriate side rails in place.

## 2019-07-02 ENCOUNTER — APPOINTMENT (OUTPATIENT)
Dept: CARDIOLOGY | Facility: CLINIC | Age: 79
End: 2019-07-02
Payer: MEDICAID

## 2019-07-02 ENCOUNTER — OUTPATIENT (OUTPATIENT)
Dept: OUTPATIENT SERVICES | Facility: HOSPITAL | Age: 79
LOS: 1 days | Discharge: HOME | End: 2019-07-02

## 2019-07-02 PROCEDURE — 99214 OFFICE O/P EST MOD 30 MIN: CPT

## 2019-07-02 NOTE — PHYSICAL EXAM
[General Appearance - Well Developed] : well developed [General Appearance - In No Acute Distress] : no acute distress [Normal Conjunctiva] : the conjunctiva exhibited no abnormalities [Eyelids - No Xanthelasma] : the eyelids demonstrated no xanthelasmas [Respiration, Rhythm And Depth] : normal respiratory rhythm and effort [Heart Rate And Rhythm] : heart rate and rhythm were normal [Auscultation Breath Sounds / Voice Sounds] : lungs were clear to auscultation bilaterally [Murmurs] : no murmurs present [Heart Sounds] : normal S1 and S2 [Bowel Sounds] : normal bowel sounds [Abdomen Soft] : soft [Abdomen Tenderness] : non-tender [] : no hepato-splenomegaly [Oriented To Time, Place, And Person] : oriented to person, place, and time [FreeTextEntry1] : 1+ edema with chronic right foot ulcer

## 2019-07-02 NOTE — DISCUSSION/SUMMARY
[FreeTextEntry1] : Increase Lasix to 40 mg BID\par Continue Eliquis\par Echo to reevaluate LV function\par Follow up in 1 month after Echo\par

## 2019-07-02 NOTE — HISTORY OF PRESENT ILLNESS
[FreeTextEntry1] : 77 yo F with h/o HFpEF, Severe MAUREEN, Severe bilateral knee arthritis, Morbid obesity s/p recent hospitalization in [place] for new-onset AF and ?CHF, followed by ER visit three days ago presents for follow up. Chronic RESTREPO likely multifactorial. Lasix increased at last visit but now back on Lasix 20 mg BID.\par \par NST (12/7/17): No ischemia\par \par

## 2019-07-16 ENCOUNTER — FORM ENCOUNTER (OUTPATIENT)
Age: 79
End: 2019-07-16

## 2019-07-17 ENCOUNTER — OUTPATIENT (OUTPATIENT)
Dept: OUTPATIENT SERVICES | Facility: HOSPITAL | Age: 79
LOS: 1 days | Discharge: HOME | End: 2019-07-17
Payer: MEDICAID

## 2019-07-17 DIAGNOSIS — I50.32 CHRONIC DIASTOLIC (CONGESTIVE) HEART FAILURE: ICD-10-CM

## 2019-07-17 DIAGNOSIS — I48.91 UNSPECIFIED ATRIAL FIBRILLATION: ICD-10-CM

## 2019-07-17 PROCEDURE — 93306 TTE W/DOPPLER COMPLETE: CPT | Mod: 26

## 2019-07-26 ENCOUNTER — RX CHANGE (OUTPATIENT)
Age: 79
End: 2019-07-26

## 2019-07-26 LAB
25(OH)D3 SERPL-MCNC: 19 NG/ML
ALBUMIN SERPL ELPH-MCNC: 4.3 G/DL
ALP BLD-CCNC: 121 U/L
ALT SERPL-CCNC: 10 U/L
ANION GAP SERPL CALC-SCNC: 15 MMOL/L
AST SERPL-CCNC: 15 U/L
BASOPHILS # BLD AUTO: 0.04 K/UL
BASOPHILS NFR BLD AUTO: 0.4 %
BILIRUB SERPL-MCNC: 0.3 MG/DL
BUN SERPL-MCNC: 35 MG/DL
CALCIUM SERPL-MCNC: 9.3 MG/DL
CHLORIDE SERPL-SCNC: 102 MMOL/L
CHOLEST SERPL-MCNC: 171 MG/DL
CHOLEST/HDLC SERPL: 2.5 RATIO
CO2 SERPL-SCNC: 22 MMOL/L
CREAT SERPL-MCNC: 1.5 MG/DL
EOSINOPHIL # BLD AUTO: 0.31 K/UL
EOSINOPHIL NFR BLD AUTO: 3.5 %
ESTIMATED AVERAGE GLUCOSE: 131 MG/DL
GLUCOSE SERPL-MCNC: 95 MG/DL
HBA1C MFR BLD HPLC: 6.2 %
HCT VFR BLD CALC: 39.8 %
HDLC SERPL-MCNC: 68 MG/DL
HGB BLD-MCNC: 11.9 G/DL
IMM GRANULOCYTES NFR BLD AUTO: 0.4 %
LDLC SERPL CALC-MCNC: 99 MG/DL
LYMPHOCYTES # BLD AUTO: 2.7 K/UL
LYMPHOCYTES NFR BLD AUTO: 30.3 %
MAN DIFF?: NORMAL
MCHC RBC-ENTMCNC: 27.5 PG
MCHC RBC-ENTMCNC: 29.9 G/DL
MCV RBC AUTO: 92.1 FL
MONOCYTES # BLD AUTO: 0.74 K/UL
MONOCYTES NFR BLD AUTO: 8.3 %
NEUTROPHILS # BLD AUTO: 5.07 K/UL
NEUTROPHILS NFR BLD AUTO: 57.1 %
PLATELET # BLD AUTO: 252 K/UL
POTASSIUM SERPL-SCNC: 5.6 MMOL/L
PROT SERPL-MCNC: 7.5 G/DL
RBC # BLD: 4.32 M/UL
RBC # FLD: 18 %
SODIUM SERPL-SCNC: 139 MMOL/L
T3FREE SERPL-MCNC: 1.96 PG/ML
T4 SERPL-MCNC: 4.3 UG/DL
TRIGL SERPL-MCNC: 124 MG/DL
TSH SERPL-ACNC: 69 UIU/ML
WBC # FLD AUTO: 8.9 K/UL

## 2019-08-08 ENCOUNTER — APPOINTMENT (OUTPATIENT)
Dept: PODIATRY | Facility: CLINIC | Age: 79
End: 2019-08-08
Payer: MEDICAID

## 2019-08-08 ENCOUNTER — OUTPATIENT (OUTPATIENT)
Dept: OUTPATIENT SERVICES | Facility: HOSPITAL | Age: 79
LOS: 1 days | Discharge: HOME | End: 2019-08-08

## 2019-08-08 VITALS
BODY MASS INDEX: 54.56 KG/M2 | HEART RATE: 65 BPM | WEIGHT: 289 LBS | HEIGHT: 61 IN | SYSTOLIC BLOOD PRESSURE: 123 MMHG | DIASTOLIC BLOOD PRESSURE: 86 MMHG

## 2019-08-08 PROCEDURE — 11042 DBRDMT SUBQ TIS 1ST 20SQCM/<: CPT

## 2019-08-14 NOTE — ASSESSMENT
[FreeTextEntry1] : Stasis Ulcer \par Wound to right lateral distal calf. \par \par Mildly debrided of eschar and wound to level of Subcutaneous tissue via curette \par Dressed with Rashmi / DSD / Kerlix \par TX tolerated well. pt to return in 1 week.

## 2019-08-22 ENCOUNTER — APPOINTMENT (OUTPATIENT)
Dept: PODIATRY | Facility: CLINIC | Age: 79
End: 2019-08-22

## 2019-09-12 ENCOUNTER — APPOINTMENT (OUTPATIENT)
Dept: GERIATRICS | Facility: CLINIC | Age: 79
End: 2019-09-12
Payer: MEDICAID

## 2019-09-12 ENCOUNTER — OUTPATIENT (OUTPATIENT)
Dept: OUTPATIENT SERVICES | Facility: HOSPITAL | Age: 79
LOS: 1 days | Discharge: HOME | End: 2019-09-12

## 2019-09-12 VITALS
TEMPERATURE: 96.5 F | WEIGHT: 293 LBS | BODY MASS INDEX: 55.32 KG/M2 | HEIGHT: 61 IN | DIASTOLIC BLOOD PRESSURE: 66 MMHG | HEART RATE: 62 BPM | SYSTOLIC BLOOD PRESSURE: 95 MMHG

## 2019-09-12 DIAGNOSIS — R21 RASH AND OTHER NONSPECIFIC SKIN ERUPTION: ICD-10-CM

## 2019-09-12 PROCEDURE — 99214 OFFICE O/P EST MOD 30 MIN: CPT

## 2019-09-12 RX ORDER — APIXABAN 5 MG/1
5 TABLET, FILM COATED ORAL TWICE DAILY
Qty: 60 | Refills: 1 | Status: DISCONTINUED | COMMUNITY
End: 2019-09-12

## 2019-09-12 RX ORDER — CLOTRIMAZOLE 10 MG/ML
1 SOLUTION TOPICAL
Qty: 1 | Refills: 3 | Status: DISCONTINUED | COMMUNITY
Start: 2018-08-30 | End: 2019-09-12

## 2019-09-12 RX ORDER — CHOLECALCIFEROL (VITAMIN D3) 1250 MCG
1.25 MG CAPSULE ORAL
Qty: 8 | Refills: 0 | Status: DISCONTINUED | COMMUNITY
Start: 2019-07-26 | End: 2019-09-12

## 2019-09-12 NOTE — REVIEW OF SYSTEMS
[Skin Lesions] : skin lesion [Itching] : itching [Negative] : Gastrointestinal [Fever] : no fever [Chills] : no chills

## 2019-09-12 NOTE — HISTORY OF PRESENT ILLNESS
[FreeTextEntry1] : Patient is a 77yo female with PMHx of heart failure with preserved ejection fraction, chronic right shoulder pain, and right venous stasis ulcer who presents to the clinic complaining of hair loss and a left foot rash. \par \par She says that the rash began approximately one month ago. She endorses that it is itchy and red, but does not hurt. She tried using hydrocortisone 1% cream but it did not improve. \par \par She also states that she has been losing more hair recently. She only takes a Vitamin D supplement at this time.\par \par Patient spoke with the clinic pharmacist regarding medication compliance and stated that she is taking her Eliquis once a day, metoprolol once a day, Lasix 20mg three times a day, and does not recall taking amiodarone.

## 2019-09-12 NOTE — PHYSICAL EXAM
[General Appearance - Alert] : alert [General Appearance - Well Nourished] : well nourished [Sclera] : the sclera and conjunctiva were normal [General Appearance - Well Developed] : well developed [PERRL With Normal Accommodation] : pupils were equal in size, round, and reactive to light [Respiration, Rhythm And Depth] : normal respiratory rhythm and effort [Auscultation Breath Sounds / Voice Sounds] : lungs were clear to auscultation bilaterally [Chest Palpation] : palpation of the chest revealed no abnormalities [Apical Impulse] : the apical impulse was normal [Heart Sounds] : normal S1 and S2 [Abdomen Tenderness] : non-tender [] : no hepato-splenomegaly [No Focal Deficits] : no focal deficits [Oriented To Time, Place, And Person] : oriented to person, place, and time [Impaired Insight] : insight and judgment were intact [Affect] : the affect was normal [Memory Recent] : recent memory was not impaired [FreeTextEntry1] : Erythematous raised lesion on anterior aspect of left leg approximately 10 inches in diameter that is not tender to palpation

## 2019-09-12 NOTE — ASSESSMENT
[FreeTextEntry1] : #Eczematous rash of left leg\par - Prescribed hydrocortisone cream 2.5% to be applied topically daily\par \par #Heart failure with preserved ejection fraction\par - Patient is non-compliant with medications and insists that she is follow Dr. Berger's instructions\par - Has follow-up appointment with Dr. Berger on 9/24/2019\par - Patient has gained 6 lbs in the last month\par - Instructed patient to discuss medications thoroughly with Dr. Berger\par - Continue with Lasix 40mg PO twice daily\par - Continue with metoprolol tartrate 12.5mg PO twice daily\par - Continue with lisinopril 5mg PO once daily\par \par #Atrial fibrillation\par - Continue with Eliquis 5mg PO twice daily\par - Continue with amiodarone 200mg PO once daily\par \par #Hypothyroidism\par - Continue with Synthroid 50mcg PO once daily\par \par #Hair loss\par - Start and continue Centrum multivitamin PO once daily\par \par #Health maintenance\par - Patient refused influenza vaccine today\par - Return to clinic in 2 months\par \par GERIATRICS ATTENDING; SEEN WITH [facility] TEAM\par Patient is not at all taking medications as prescribed - IE: elliquis only QD, metoprolol tartrate QD and lasix 60 instead of 80 - the documented med list here is consistent with the documentation of Dr. Annetta Berger but the patient insists she is to take it all her way; also it is not clear at all if she is taking amiodarone - thankfully she will be seeing Dr. berger on 9/24 - she agrees to get full medication reconciliation there and then to elaborate with us in November her medication regimen.\par Also has what appears to be eczema of left pre tibial area; will treat with 2.5% cream.\par She refuses flu vaccine today.

## 2019-09-16 DIAGNOSIS — I48.91 UNSPECIFIED ATRIAL FIBRILLATION: ICD-10-CM

## 2019-09-16 DIAGNOSIS — R21 RASH AND OTHER NONSPECIFIC SKIN ERUPTION: ICD-10-CM

## 2019-09-16 DIAGNOSIS — E66.01 MORBID (SEVERE) OBESITY DUE TO EXCESS CALORIES: ICD-10-CM

## 2019-09-16 DIAGNOSIS — I50.32 CHRONIC DIASTOLIC (CONGESTIVE) HEART FAILURE: ICD-10-CM

## 2019-09-16 DIAGNOSIS — I10 ESSENTIAL (PRIMARY) HYPERTENSION: ICD-10-CM

## 2019-09-24 ENCOUNTER — OUTPATIENT (OUTPATIENT)
Dept: OUTPATIENT SERVICES | Facility: HOSPITAL | Age: 79
LOS: 1 days | Discharge: HOME | End: 2019-09-24

## 2019-09-24 ENCOUNTER — APPOINTMENT (OUTPATIENT)
Dept: CARDIOLOGY | Facility: CLINIC | Age: 79
End: 2019-09-24
Payer: MEDICAID

## 2019-09-24 VITALS
DIASTOLIC BLOOD PRESSURE: 82 MMHG | WEIGHT: 293 LBS | HEIGHT: 61 IN | SYSTOLIC BLOOD PRESSURE: 146 MMHG | TEMPERATURE: 96.8 F | BODY MASS INDEX: 55.32 KG/M2 | HEART RATE: 75 BPM

## 2019-09-24 PROCEDURE — 99213 OFFICE O/P EST LOW 20 MIN: CPT

## 2019-09-24 NOTE — HISTORY OF PRESENT ILLNESS
[FreeTextEntry1] : 77 yo F with h/o HFpEF, Severe MAUREEN, Severe bilateral knee arthritis, Morbid obesity s/p recent hospitalization in [place] for new-onset AF and ?CHF, followed by ER visit three days ago presents for follow up. Chronic RESTREPO likely multifactorial. Lasix increased at last visit but now back on Lasix 20 mg BID.\par \par Presents to day for follow up and c/o cough and fatigue. No fevers. No s/s bleeding on Eliquis.\par \par Echo (7/17/19): EF 55-65%, G1DD, Mild AI, Mild MR\par NST (12/7/17): No ischemia\par \par

## 2019-09-24 NOTE — PHYSICAL EXAM
[General Appearance - Well Developed] : well developed [General Appearance - In No Acute Distress] : no acute distress [Eyelids - No Xanthelasma] : the eyelids demonstrated no xanthelasmas [Normal Conjunctiva] : the conjunctiva exhibited no abnormalities [Respiration, Rhythm And Depth] : normal respiratory rhythm and effort [Auscultation Breath Sounds / Voice Sounds] : lungs were clear to auscultation bilaterally [Heart Sounds] : normal S1 and S2 [Heart Rate And Rhythm] : heart rate and rhythm were normal [Murmurs] : no murmurs present [Abdomen Tenderness] : non-tender [Abdomen Soft] : soft [Bowel Sounds] : normal bowel sounds [] : no hepato-splenomegaly [Oriented To Time, Place, And Person] : oriented to person, place, and time [FreeTextEntry1] : 1+ edema with chronic right foot ulcer

## 2019-09-24 NOTE — DISCUSSION/SUMMARY
[FreeTextEntry1] : Continue Lasix 40 mg BID\par Will consider increasing Lisinopril to 10 mg if BP remains elevated\par Continue Eliquis\par Weight loss\par Follow up in 6 months

## 2019-11-06 ENCOUNTER — EMERGENCY (EMERGENCY)
Facility: HOSPITAL | Age: 79
LOS: 0 days | Discharge: HOME | End: 2019-11-06
Attending: EMERGENCY MEDICINE | Admitting: EMERGENCY MEDICINE
Payer: MEDICAID

## 2019-11-06 VITALS
SYSTOLIC BLOOD PRESSURE: 140 MMHG | HEART RATE: 75 BPM | DIASTOLIC BLOOD PRESSURE: 67 MMHG | RESPIRATION RATE: 22 BRPM | OXYGEN SATURATION: 97 % | TEMPERATURE: 97 F

## 2019-11-06 DIAGNOSIS — I50.9 HEART FAILURE, UNSPECIFIED: ICD-10-CM

## 2019-11-06 DIAGNOSIS — Z79.82 LONG TERM (CURRENT) USE OF ASPIRIN: ICD-10-CM

## 2019-11-06 DIAGNOSIS — R05 COUGH: ICD-10-CM

## 2019-11-06 DIAGNOSIS — I11.0 HYPERTENSIVE HEART DISEASE WITH HEART FAILURE: ICD-10-CM

## 2019-11-06 DIAGNOSIS — I25.10 ATHEROSCLEROTIC HEART DISEASE OF NATIVE CORONARY ARTERY WITHOUT ANGINA PECTORIS: ICD-10-CM

## 2019-11-06 DIAGNOSIS — E03.9 HYPOTHYROIDISM, UNSPECIFIED: ICD-10-CM

## 2019-11-06 DIAGNOSIS — R06.02 SHORTNESS OF BREATH: ICD-10-CM

## 2019-11-06 LAB
ANION GAP SERPL CALC-SCNC: 12 MMOL/L — SIGNIFICANT CHANGE UP (ref 7–14)
APTT BLD: 39.1 SEC — SIGNIFICANT CHANGE UP (ref 27–39.2)
BASOPHILS # BLD AUTO: 0.02 K/UL — SIGNIFICANT CHANGE UP (ref 0–0.2)
BASOPHILS NFR BLD AUTO: 0.3 % — SIGNIFICANT CHANGE UP (ref 0–1)
BUN SERPL-MCNC: 18 MG/DL — SIGNIFICANT CHANGE UP (ref 10–20)
CALCIUM SERPL-MCNC: 8.9 MG/DL — SIGNIFICANT CHANGE UP (ref 8.5–10.1)
CHLORIDE SERPL-SCNC: 106 MMOL/L — SIGNIFICANT CHANGE UP (ref 98–110)
CO2 SERPL-SCNC: 24 MMOL/L — SIGNIFICANT CHANGE UP (ref 17–32)
CREAT SERPL-MCNC: 0.9 MG/DL — SIGNIFICANT CHANGE UP (ref 0.7–1.5)
EOSINOPHIL # BLD AUTO: 0.18 K/UL — SIGNIFICANT CHANGE UP (ref 0–0.7)
EOSINOPHIL NFR BLD AUTO: 2.6 % — SIGNIFICANT CHANGE UP (ref 0–8)
GLUCOSE SERPL-MCNC: 111 MG/DL — HIGH (ref 70–99)
HCT VFR BLD CALC: 34.6 % — LOW (ref 37–47)
HGB BLD-MCNC: 10.6 G/DL — LOW (ref 12–16)
IMM GRANULOCYTES NFR BLD AUTO: 0.3 % — SIGNIFICANT CHANGE UP (ref 0.1–0.3)
INR BLD: 1.18 RATIO — SIGNIFICANT CHANGE UP (ref 0.65–1.3)
LACTATE SERPL-SCNC: 0.7 MMOL/L — SIGNIFICANT CHANGE UP (ref 0.5–2.2)
LYMPHOCYTES # BLD AUTO: 1.29 K/UL — SIGNIFICANT CHANGE UP (ref 1.2–3.4)
LYMPHOCYTES # BLD AUTO: 18.4 % — LOW (ref 20.5–51.1)
MCHC RBC-ENTMCNC: 27.2 PG — SIGNIFICANT CHANGE UP (ref 27–31)
MCHC RBC-ENTMCNC: 30.6 G/DL — LOW (ref 32–37)
MCV RBC AUTO: 88.9 FL — SIGNIFICANT CHANGE UP (ref 81–99)
MONOCYTES # BLD AUTO: 0.47 K/UL — SIGNIFICANT CHANGE UP (ref 0.1–0.6)
MONOCYTES NFR BLD AUTO: 6.7 % — SIGNIFICANT CHANGE UP (ref 1.7–9.3)
NEUTROPHILS # BLD AUTO: 5.03 K/UL — SIGNIFICANT CHANGE UP (ref 1.4–6.5)
NEUTROPHILS NFR BLD AUTO: 71.7 % — SIGNIFICANT CHANGE UP (ref 42.2–75.2)
NRBC # BLD: 0 /100 WBCS — SIGNIFICANT CHANGE UP (ref 0–0)
NT-PROBNP SERPL-SCNC: 1183 PG/ML — HIGH (ref 0–300)
PLATELET # BLD AUTO: 242 K/UL — SIGNIFICANT CHANGE UP (ref 130–400)
POTASSIUM SERPL-MCNC: 4.4 MMOL/L — SIGNIFICANT CHANGE UP (ref 3.5–5)
POTASSIUM SERPL-SCNC: 4.4 MMOL/L — SIGNIFICANT CHANGE UP (ref 3.5–5)
PROTHROM AB SERPL-ACNC: 13.5 SEC — HIGH (ref 9.95–12.87)
RBC # BLD: 3.89 M/UL — LOW (ref 4.2–5.4)
RBC # FLD: 15.9 % — HIGH (ref 11.5–14.5)
SODIUM SERPL-SCNC: 142 MMOL/L — SIGNIFICANT CHANGE UP (ref 135–146)
TROPONIN T SERPL-MCNC: <0.01 NG/ML — SIGNIFICANT CHANGE UP
WBC # BLD: 7.01 K/UL — SIGNIFICANT CHANGE UP (ref 4.8–10.8)
WBC # FLD AUTO: 7.01 K/UL — SIGNIFICANT CHANGE UP (ref 4.8–10.8)

## 2019-11-06 PROCEDURE — 99285 EMERGENCY DEPT VISIT HI MDM: CPT

## 2019-11-06 PROCEDURE — 71046 X-RAY EXAM CHEST 2 VIEWS: CPT | Mod: 26

## 2019-11-06 PROCEDURE — 93010 ELECTROCARDIOGRAM REPORT: CPT

## 2019-11-06 RX ORDER — SODIUM CHLORIDE 9 MG/ML
3 INJECTION INTRAMUSCULAR; INTRAVENOUS; SUBCUTANEOUS EVERY 8 HOURS
Refills: 0 | Status: DISCONTINUED | OUTPATIENT
Start: 2019-11-06 | End: 2019-11-06

## 2019-11-06 RX ORDER — FUROSEMIDE 40 MG
40 TABLET ORAL ONCE
Refills: 0 | Status: COMPLETED | OUTPATIENT
Start: 2019-11-06 | End: 2019-11-06

## 2019-11-06 RX ADMIN — Medication 40 MILLIGRAM(S): at 14:32

## 2019-11-06 NOTE — ED PROVIDER NOTE - PATIENT PORTAL LINK FT
You can access the FollowMyHealth Patient Portal offered by [facility] by registering at the following website: [email or web address]. By joining "[employer]’s FollowMyHealth portal, you will also be able to view your health information using other applications (apps) compatible with our system.

## 2019-11-06 NOTE — ED ADULT NURSE NOTE - OBJECTIVE STATEMENT
Pt c/o productive cough with green phlegm and shortness of breath x2 weeks. Pt endorses symptoms unrelieved by OTC products. Denies fevers, chills, chest pain, abd pain, back pain, weakness, n/v/d.

## 2019-11-06 NOTE — ED PROVIDER NOTE - PROGRESS NOTE DETAILS
Spoke to patient and daughter regarding results- will give 1 IV dose Lasix. Patient prefers to go home because she feels good only came for cough. Told to follow-up with PMD and to return for SOB or CP. Daughter and patient demonstrate understanding.

## 2019-11-06 NOTE — ED PROVIDER NOTE - NSFOLLOWUPINSTRUCTIONS_ED_ALL_ED_FT
Congestive Heart Failure (CHF)    Congestive heart failure is a chronic condition in which the heart has trouble pumping blood. In some cases of heart failure, fluid may back up into your lungs or you may have swelling (edema) in your lower legs. There are many causes of heart failure including high blood pressure, coronary artery disease, abnormal heart valves, heart muscle disease, lung disease, diabetes, etc. Symptoms include shortness of breath with activity or when lying flat, cough, swelling of the legs, fatigue, or increased urination during the night.     Treatment is aimed at managing the symptoms of heart failure and may include lifestyle changes, medications, or surgical procedures. Take medicines only as directed by your health care provider and do not stop unless instructed to do so. Eat heart-healthy foods with low or no trans/saturated fats, cholesterol and salt. Weigh yourself every day for early recognition of fluid accumulation.    SEEK IMMEDIATE MEDICAL CARE IF YOU HAVE ANY OF THE FOLLOWING SYMPTOMS: shortness of breath, change in mental status, chest pain, lightheadedness/dizziness/fainting, or worsening of symptoms including not being able to conduct normal physical activity.    Cough    Coughing is a reflex that clears your throat and your airways. Coughing helps to heal and protect your lungs. It is normal to cough occasionally, but a cough that happens with other symptoms or lasts a long time may be a sign of a condition that needs treatment. Coughing may be caused by infections, asthma or COPD, smoking, postnasal drip, gastroesophageal reflux, as well as other medical conditions. Take medicines only as instructed by your health care provider. Avoid environments or triggers that causes you to cough at work or at home.    SEEK IMMEDIATE MEDICAL CARE IF YOU HAVE ANY OF THE FOLLOWING SYMPTOMS: coughing up blood, shortness of breath, rapid heart rate, chest pain, unexplained weight loss or night sweats.

## 2019-11-06 NOTE — ED PROVIDER NOTE - CLINICAL SUMMARY MEDICAL DECISION MAKING FREE TEXT BOX
78 yo F with PMH of CHF CAD presented to ED for cough x2 weeks. Patient is not having any SOB or LE swelling. CXR demonstrates pulmonary congestion. Lasix ordered in ED. Discussed with patient DC home vs admission. Patient is not having any SOB and she is able to fu with her PCP this week. Discussed strict return precautions- worsening SOB, CP.

## 2019-11-06 NOTE — ED PROVIDER NOTE - ATTENDING CONTRIBUTION TO CARE
78 yo F with PMH of CHF, CAD, hypothyroidism presents to Ed for cough x2 weeks associated with SOB. Patient states the cough was initially productive of white sputum but then turned green No fever or chills. She has not noticed decreased exercise tolerance. No CP, palpitations.    On PE patient sitting on stretcher in NAD. Lungs CTA, cardiac RRR.     Concern for ACS. CHF exacerbation vs bronchitis vs pneumonia.   Will do labs, EKG, CXR and reassess

## 2019-11-06 NOTE — ED PROVIDER NOTE - OBJECTIVE STATEMENT
78 y/o female with hx CAD, CHF, Hypothyroid presents to the ED accompanied by daughter c/o "I have a bad cough with green phlegm for 2 weeks with SOB. I have pain only when I'm coughing." no leg pain/ fever/ chills/ weakness/ abdominal pain/ hemoptysis/ night sweats

## 2019-11-20 ENCOUNTER — LABORATORY RESULT (OUTPATIENT)
Age: 79
End: 2019-11-20

## 2019-11-20 ENCOUNTER — APPOINTMENT (OUTPATIENT)
Dept: GERIATRICS | Facility: CLINIC | Age: 79
End: 2019-11-20
Payer: MEDICAID

## 2019-11-20 ENCOUNTER — OUTPATIENT (OUTPATIENT)
Dept: OUTPATIENT SERVICES | Facility: HOSPITAL | Age: 79
LOS: 1 days | Discharge: HOME | End: 2019-11-20

## 2019-11-20 VITALS
SYSTOLIC BLOOD PRESSURE: 152 MMHG | TEMPERATURE: 96.4 F | BODY MASS INDEX: 55.32 KG/M2 | DIASTOLIC BLOOD PRESSURE: 74 MMHG | HEIGHT: 61 IN | HEART RATE: 71 BPM | WEIGHT: 293 LBS

## 2019-11-20 PROCEDURE — 99214 OFFICE O/P EST MOD 30 MIN: CPT | Mod: GC

## 2019-11-20 RX ORDER — ACETAMINOPHEN ER 650 MG TABLET,EXTENDED RELEASE 650 MG
650 TABLET, EXTENDED RELEASE ORAL
Qty: 90 | Refills: 3 | Status: DISCONTINUED | COMMUNITY
Start: 2018-10-04 | End: 2019-11-20

## 2019-11-20 RX ORDER — AMIODARONE HYDROCHLORIDE 200 MG/1
200 TABLET ORAL DAILY
Qty: 30 | Refills: 2 | Status: DISCONTINUED | COMMUNITY
End: 2019-11-20

## 2019-11-20 NOTE — ASSESSMENT
[FreeTextEntry1] : #) Eczema - c/w Hydrocortisone topical PRN\par \par #) HFpEF\par - c/w Lasix 40 BID + Metoprolol + Lisinopril\par - follows w/ Cardio (Dr Berger)\par \par #) A-fib - c/w Eliquis + BB, patient not taking Amiodarone, d/c'd from Med rec\par \par #) Hypothyroidism - c/w Synthroid\par \par #) Obesity / MAUREEN - counselled on weight loss, saw Pulm in past, had sleep study confirming MAUREEN, did not get CPAP, would need repeat sleep study\par \par #) HCM\par - agreeable for flu vaccine - given\par - recent labs reviewed in [facility] - will order TSH (last one 68?)\par - RTC in 3 months [Medication Management] : medication management

## 2019-11-20 NOTE — HISTORY OF PRESENT ILLNESS
[FreeTextEntry1] : 80 yo Dutch-speaking F with PMH of HFpEF, A-fib on Eliquis, hypothyroidism, obesity, MAUREEN here at [facility] for routine follow up. Translation provided by daughter at bedside. Reports generally doing well.\par \par Still complaining of LLE rash. States hydrocortisone helped initial rash, but then new rash appeared slightly above old rash. Hydrocortisone helps with itching, no pain, no vesicles. no rash anywhere else other than LLE. Also complaining of chronic cough ongoing several for weeks. No fevers, chills, chest pain. Tried OTC cough suppressants with minimal relief.\par \par No other complaints or symptoms. Daughter reports patient is going to [place] for month of December, will be back in new year, requesting refills until then.

## 2019-11-25 DIAGNOSIS — E03.9 HYPOTHYROIDISM, UNSPECIFIED: ICD-10-CM

## 2019-11-25 DIAGNOSIS — I48.91 UNSPECIFIED ATRIAL FIBRILLATION: ICD-10-CM

## 2019-11-25 DIAGNOSIS — I10 ESSENTIAL (PRIMARY) HYPERTENSION: ICD-10-CM

## 2019-11-25 DIAGNOSIS — I50.32 CHRONIC DIASTOLIC (CONGESTIVE) HEART FAILURE: ICD-10-CM

## 2019-11-27 ENCOUNTER — RX RENEWAL (OUTPATIENT)
Age: 79
End: 2019-11-27

## 2019-11-27 LAB
ESTIMATED AVERAGE GLUCOSE: 123 MG/DL
HBA1C MFR BLD HPLC: 5.9 %
TSH SERPL-ACNC: 28.7 UIU/ML

## 2020-02-29 ENCOUNTER — RX RENEWAL (OUTPATIENT)
Age: 80
End: 2020-02-29

## 2020-09-10 ENCOUNTER — APPOINTMENT (OUTPATIENT)
Dept: GERIATRICS | Facility: CLINIC | Age: 80
End: 2020-09-10
Payer: MEDICAID

## 2020-09-10 ENCOUNTER — LABORATORY RESULT (OUTPATIENT)
Age: 80
End: 2020-09-10

## 2020-09-10 ENCOUNTER — OUTPATIENT (OUTPATIENT)
Dept: OUTPATIENT SERVICES | Facility: HOSPITAL | Age: 80
LOS: 1 days | Discharge: HOME | End: 2020-09-10

## 2020-09-10 VITALS — TEMPERATURE: 98.2 F | HEIGHT: 61 IN | BODY MASS INDEX: 55.32 KG/M2 | WEIGHT: 293 LBS

## 2020-09-10 VITALS — SYSTOLIC BLOOD PRESSURE: 87 MMHG | HEART RATE: 128 BPM | DIASTOLIC BLOOD PRESSURE: 61 MMHG

## 2020-09-10 PROCEDURE — 99214 OFFICE O/P EST MOD 30 MIN: CPT

## 2020-09-10 RX ORDER — MULTIVIT-MIN/FA/LYCOPEN/LUTEIN .4-300-25
TABLET ORAL DAILY
Qty: 90 | Refills: 1 | Status: DISCONTINUED | COMMUNITY
Start: 2019-09-12 | End: 2020-09-10

## 2020-09-10 RX ORDER — HYDROCORTISONE 25 MG/G
2.5 CREAM TOPICAL TWICE DAILY
Qty: 1 | Refills: 3 | Status: DISCONTINUED | COMMUNITY
Start: 2019-09-12 | End: 2020-09-10

## 2020-09-10 NOTE — END OF VISIT
[] : Resident [FreeTextEntry3] : Patient here with son-in-law, has not been taking some of her meds and needs renewals\par Discussed her current status above\par 1. will get labs now\par 2. renew all medications, except for levothyroxine - need to assess current thyroid parameters (last done in November with TSh of 27\par Plan\par 1. will have telehealth visit next week to review how she feels back on meds and then also to review labs and decide on thyroid dose supplementation\par 2. RTC for physical re-evaluation in 2 months\par 3.receiving flu vaccine today.

## 2020-09-10 NOTE — PLAN
[FreeTextEntry1] : 1. Left Joint Pain and Right shoulder pain\par - Could be related to obseity so educated about importance of weight loss and life style modifications\par - Will monitor pain for now and encourage passive range of motion exercises at home\par \par \par 2. Hypothyroidism\par * Has been off synthroid 75mcg daily in the last 2 months\par * Last TSH was 28.7 on 11/20/2019\par - Will repeat TSH and free T4\par - Will arrange a tele health meeting in 1 week to discuss whether synthroid can be continued at 75mcg daily\par \par \par 3. Atrial fibrillation \par * On Eliquis 5mg QD\par * Has been off metoprolol 12.5 mg BID in the last 2 months\par - Continue Eliquis 5mg QD and metoprolol 12.5 mg BID \par \par \par 4. HFpEF \par * Last TTE in July 2019 revealing EF 55-60%, mild TR\par * On Lasix 40mg PO QD. \par - Continue Lasix 40mg PO QD\par \par \par 5. Hypertnesion\par * Has been off Lisinopril 5mg QD in the last 2 months\par - Monitor blood pressure closely at home\par - Will continue lisinopril 5mg PO QD\par \par \par 6. Health Maintenance\par - Will administer flu shot today 09/10\par - Already had her mammograms and Pap smears\par - No colonoscopy before\par - Will repeat CBC, CMP, lipid profile, and Hba1c\par - Follow up at [facility] clinic in 2 months

## 2020-09-10 NOTE — PHYSICAL EXAM
[Well Nourished] : well nourished [No Acute Distress] : no acute distress [Well-Appearing] : well-appearing [Well Developed] : well developed [EOMI] : extraocular movements intact [Normal Sclera/Conjunctiva] : normal sclera/conjunctiva [PERRL] : pupils equal round and reactive to light [Normal Oropharynx] : the oropharynx was normal [Normal Outer Ear/Nose] : the outer ears and nose were normal in appearance [No JVD] : no jugular venous distention [Normal TMs] : both tympanic membranes were normal [Supple] : supple [No Lymphadenopathy] : no lymphadenopathy [No Respiratory Distress] : no respiratory distress  [Thyroid Normal, No Nodules] : the thyroid was normal and there were no nodules present [No Accessory Muscle Use] : no accessory muscle use [Normal Rate] : normal rate  [Clear to Auscultation] : lungs were clear to auscultation bilaterally [Normal S1, S2] : normal S1 and S2 [Regular Rhythm] : with a regular rhythm [No Carotid Bruits] : no carotid bruits [No Abdominal Bruit] : a ~M bruit was not heard ~T in the abdomen [No Varicosities] : no varicosities [Soft] : abdomen soft [Non-distended] : non-distended [Non Tender] : non-tender [Grossly Normal Strength/Tone] : grossly normal strength/tone [No Masses] : no abdominal mass palpated [Normal Bowel Sounds] : normal bowel sounds [de-identified] : +1 piiting edema bilaterally  [de-identified] : normal range of motion bilaterallyu, no skin changes, no rash, no warmth, no tenderness, mild swelling from obesity?

## 2020-09-10 NOTE — REVIEW OF SYSTEMS
[Lower Ext Edema] : lower extremity edema [Joint Pain] : joint pain [Joint Stiffness] : joint stiffness [Negative] : Psychiatric [Chills] : no chills [Fever] : no fever [Recent Change In Weight] : ~T no recent weight change [Night Sweats] : no night sweats [Fatigue] : no fatigue [Pain] : no pain [Discharge] : no discharge [Vision Problems] : no vision problems [Redness] : no redness [Itching] : no itching [Earache] : no earache [Hearing Loss] : no hearing loss [Nosebleed] : no nosebleeds [Sore Throat] : no sore throat [Nasal Discharge] : no nasal discharge [Chest Pain] : no chest pain [Postnasal Drip] : no postnasal drip [Palpitations] : no palpitations [Paroxysmal Nocturnal Dyspnea] : no paroxysmal nocturnal dyspnea [Orthopnea] : no orthopnea [Leg Claudication] : no leg claudication [Shortness Of Breath] : no shortness of breath [Wheezing] : no wheezing [Abdominal Pain] : no abdominal pain [Cough] : no cough [Nausea] : no nausea [Constipation] : no constipation [Diarrhea] : diarrhea [Vomiting] : no vomiting [Heartburn] : no heartburn [Melena] : no melena [Dysuria] : no dysuria [Incontinence] : no incontinence [Nocturia] : no nocturia [Hematuria] : no hematuria [Frequency] : no frequency [Vaginal Discharge] : no vaginal discharge [Dysmenorrhea] : no dysmenorrhea [Joint Swelling] : no joint swelling [Muscle Weakness] : no muscle weakness [FreeTextEntry5] : +1 pitting edema in both lower extremities [FreeTextEntry9] : left knee pain and right shoulder pain, 5/5 motor power all over

## 2020-09-10 NOTE — ASSESSMENT
[FreeTextEntry1] : 79 year old female known to have Atrial fibrillation, HFpEF, hypertnesion, and Hypothyroidism presenting on 09/10 for medication refill since she has been off 3 of them in the last 2 months.

## 2020-09-10 NOTE — HISTORY OF PRESENT ILLNESS
[Family Member] : family member [de-identified] : 79 year old female known to have:\par - Atrial fibrillation on Eliquis 5mg QD. Has been off metoprolol 12.5 mg BID in the last 2 months\par - HFpEF with last TTE in July 2019 revealing EF 55-60%, mild TR. On Lasix 40mg PO QD. \par - hypertnesion: Has been off Lisinopril 5mg QD in the last 2 months\par - Hypothyroidism: Has been off synthroid 75mcg daily in the last 2 months. Last TSH was 28.7 on 11/20/2019\par \par Presenting on 09/10 for medication refills and joint pain.\par History being translated from [place] to English by son.\par Patient reports 1 year history of left knee pain and 2 months history of right shoulder pain.\par Pain is pressure-like in nature, of around 4/10 in intensity. Joint pain increases with standing but has not impaired her ambulation with a cane. Shoulder pain increases with movement of right arm. Pain is partially relieved with Tylenol. \par \par Patient otherwise has adequate appetite and no nausea or vomiting.\par On ROS, she denies any fever, chills, recent GI, , or URTI symptoms.\par  [FreeTextEntry1] : Medications refills\par Joint pains

## 2020-09-17 LAB
ALBUMIN SERPL ELPH-MCNC: 4.1 G/DL
ALP BLD-CCNC: 117 U/L
ALT SERPL-CCNC: 23 U/L
ANION GAP SERPL CALC-SCNC: 13 MMOL/L
AST SERPL-CCNC: 34 U/L
BASOPHILS # BLD AUTO: 0.02 K/UL
BASOPHILS NFR BLD AUTO: 0.2 %
BILIRUB SERPL-MCNC: 0.3 MG/DL
BUN SERPL-MCNC: 23 MG/DL
CALCIUM SERPL-MCNC: 9.1 MG/DL
CHLORIDE SERPL-SCNC: 104 MMOL/L
CHOLEST SERPL-MCNC: 128 MG/DL
CHOLEST/HDLC SERPL: 2.6 RATIO
CO2 SERPL-SCNC: 25 MMOL/L
CREAT SERPL-MCNC: 1.2 MG/DL
EOSINOPHIL # BLD AUTO: 0.05 K/UL
EOSINOPHIL NFR BLD AUTO: 0.5 %
ESTIMATED AVERAGE GLUCOSE: 126 MG/DL
GLUCOSE SERPL-MCNC: 118 MG/DL
HBA1C MFR BLD HPLC: 6 %
HCT VFR BLD CALC: 35.3 %
HDLC SERPL-MCNC: 49 MG/DL
HGB BLD-MCNC: 9.8 G/DL
IMM GRANULOCYTES NFR BLD AUTO: 0.4 %
LDLC SERPL CALC-MCNC: 66 MG/DL
LYMPHOCYTES # BLD AUTO: 1.18 K/UL
LYMPHOCYTES NFR BLD AUTO: 12.1 %
MAN DIFF?: NORMAL
MCHC RBC-ENTMCNC: 21.3 PG
MCHC RBC-ENTMCNC: 27.8 G/DL
MCV RBC AUTO: 76.7 FL
MONOCYTES # BLD AUTO: 0.47 K/UL
MONOCYTES NFR BLD AUTO: 4.8 %
NEUTROPHILS # BLD AUTO: 8.01 K/UL
NEUTROPHILS NFR BLD AUTO: 82 %
PLATELET # BLD AUTO: 310 K/UL
POTASSIUM SERPL-SCNC: 4.2 MMOL/L
PROT SERPL-MCNC: 7.5 G/DL
RBC # BLD: 4.6 M/UL
RBC # FLD: 21.2 %
SODIUM SERPL-SCNC: 142 MMOL/L
T4 FREE SERPL-MCNC: 0.9 NG/DL
TRIGL SERPL-MCNC: 123 MG/DL
WBC # FLD AUTO: 9.77 K/UL

## 2020-09-21 ENCOUNTER — APPOINTMENT (OUTPATIENT)
Dept: GERIATRICS | Facility: CLINIC | Age: 80
End: 2020-09-21
Payer: MEDICAID

## 2020-09-21 ENCOUNTER — LABORATORY RESULT (OUTPATIENT)
Age: 80
End: 2020-09-21

## 2020-09-21 DIAGNOSIS — D50.9 IRON DEFICIENCY ANEMIA, UNSPECIFIED: ICD-10-CM

## 2020-09-21 PROCEDURE — 99213 OFFICE O/P EST LOW 20 MIN: CPT | Mod: GC,95

## 2020-09-22 DIAGNOSIS — I73.9 PERIPHERAL VASCULAR DISEASE, UNSPECIFIED: ICD-10-CM

## 2020-09-22 DIAGNOSIS — E78.5 HYPERLIPIDEMIA, UNSPECIFIED: ICD-10-CM

## 2020-09-22 DIAGNOSIS — I48.91 UNSPECIFIED ATRIAL FIBRILLATION: ICD-10-CM

## 2020-09-22 DIAGNOSIS — I10 ESSENTIAL (PRIMARY) HYPERTENSION: ICD-10-CM

## 2020-09-22 DIAGNOSIS — I50.32 CHRONIC DIASTOLIC (CONGESTIVE) HEART FAILURE: ICD-10-CM

## 2020-09-22 NOTE — REASON FOR VISIT
[Home] : at home, [unfilled] , at the time of the visit. [Medical Office: ([facility])___] : at the medical office located in  [Verbal consent obtained from patient] : the patient, [unfilled] [Follow-Up] : a follow-up visit [Family Member] : family member [Source: ______] : History obtained from [unfilled] [Intercurrent Specialty/Sub-specialty Visits] : the patient has no intercurrent specialty/sub-specialty visits [FreeTextEntry1] : For medication and lab results

## 2020-09-22 NOTE — HISTORY OF PRESENT ILLNESS
[FreeTextEntry1] : This is a 79 year old female with PMHx of CHFpEF, A-fib, HTN, Hypothyroidism who presented for a telehealth visit to review lab results and update how the patient is doing on their heart failure medications. Arias Fung, the son in law, was present on the visit with the patient to provide translation and to assist with the video chatting. The patient states that she has been having an increased urination since resuming the lasix. She also reports that she is having some difficulty sleeping at night which the son in law attributes to her not doing much during the day. The patient admits to not being very active. She will attempt to be more active and more mobile in order to try and tire herself out more. \par \par We reviewed the lab results. Noted that her TSH is elevated at 10.4 with a T4 of 0.9. Although these numbers are improved from her prior history she will need to be on the levothyroxine. A prescription will be sent to her pharmacy. We also noted that there was a drop in her hemoglobin from 11.9 to 9.8. The patient reported no signs of weight loss or any bleeding. Noted her MCV in the 70s and RDW over 20. Iron panel ordered for the patient to get prior to their next visit as well as a repeat TSH and T4 which will allow the patient time to reassess how they are doing after being on the levothyroxine.

## 2020-09-22 NOTE — PHYSICAL EXAM
[General Appearance - In No Acute Distress] : in no acute distress [] : no respiratory distress [Exaggerated Use Of Accessory Muscles For Inspiration] : no accessory muscle use

## 2020-09-22 NOTE — REVIEW OF SYSTEMS
[Negative] : Endocrine [Dysuria] : no dysuria [Incontinence] : no incontinence [Dysmenorrhea] : no dysmenorrhea [Proptosis] : no proptosis [Hot Flashes] : no hot flashes [Muscle Weakness] : no muscle weakness [Feelings Of Weakness] : no feelings of weakness [Easy Bleeding] : no tendency for easy bleeding [Easy Bruising] : no tendency for easy bruising [FreeTextEntry8] : Increased Urination

## 2020-09-22 NOTE — ASSESSMENT
[FreeTextEntry1] : This is a 79 year old female with PMHx of CHFpEF, A-fib, HTN, Hypothyroidism who presented for a telehealth visit to review lab results and update how the patient is doing on their heart failure medications. \par \par #Hypothyroidisim\par - Noted TSH 10.4 with T4 0.9\par - Restart on Levothyroxine 75mcg daily\par - Repeat TSH prior to next office visit which is in November \par \par #Microcytic Anemia\par - No complaints of bleeding or weight loss\par - RDW >20, MCV <80. \par - Iron Panel Ordered \par - Will likely need to be started on iron supplementation moving forward\par \par #CHFpEF\par - Continue on current meds\par \par #HTN\par - Continue on meds \par \par RTC for inperson visit in Novmeber. All questions answered and patient and son in law understanding of plan.  [Medication Management] : medication management

## 2020-09-29 ENCOUNTER — OUTPATIENT (OUTPATIENT)
Dept: OUTPATIENT SERVICES | Facility: HOSPITAL | Age: 80
LOS: 1 days | Discharge: HOME | End: 2020-09-29

## 2020-09-29 ENCOUNTER — APPOINTMENT (OUTPATIENT)
Dept: PODIATRY | Facility: CLINIC | Age: 80
End: 2020-09-29
Payer: MEDICAID

## 2020-09-29 PROCEDURE — 99213 OFFICE O/P EST LOW 20 MIN: CPT | Mod: 25

## 2020-09-29 PROCEDURE — 11042 DBRDMT SUBQ TIS 1ST 20SQCM/<: CPT | Mod: 59

## 2020-09-29 PROCEDURE — 29581 APPL MULTLAYER CMPRN SYS LEG: CPT | Mod: 59

## 2020-09-29 NOTE — REASON FOR VISIT
[Follow-Up Visit] : a follow-up visit for [Follow-Up] : a follow-up visit [Initial Visit] : an initial visit for

## 2020-09-30 NOTE — ASSESSMENT
[FreeTextEntry1] : Venous Stasis Wound, Right Ankle\par \par Patient seen and evaluated \par All findings discussed with patient \par Wound debrided with curette after topical lidocaine application \par Dressed with cydney/dsd/unna boot/kerlex/coban \par F/u in 1 week

## 2020-09-30 NOTE — PHYSICAL EXAM
[1+] : right foot posterior tibialis 1+ [2+] : right foot dorsalis pedis 2+ [Ankle Swelling (On Exam)] : present [Ankle Swelling On The Right] : of the right ankle [Varicose Veins Of Lower Extremities] : not present [de-identified] : Pain on light touch to wound site, Right lateral aspect [FreeTextEntry1] : Wound to lateral aspect of Right ankle. Fibrogranular wound base with rolled edge. \par Measurement: 7.5 cm x 4 cm x 0.25 cm \par

## 2020-09-30 NOTE — PHYSICAL EXAM
[1+] : right foot posterior tibialis 1+ [2+] : right foot dorsalis pedis 2+ [Ankle Swelling (On Exam)] : present [Ankle Swelling On The Right] : of the right ankle [Varicose Veins Of Lower Extremities] : not present [de-identified] : Pain on light touch to wound site, Right lateral aspect [FreeTextEntry1] : Wound to lateral aspect of Right ankle. Fibrogranular wound base with rolled edge. \par Measurement: 7.5 cm x 4 cm x 0.25 cm \par

## 2020-09-30 NOTE — HISTORY OF PRESENT ILLNESS
[FreeTextEntry1] : Pt presents with re-opening of prior wound to lateral aspect of Right ankle. She reports pain to wound site. \par Pt admits that wound has been present for 2 months\par Denies N/V/F/C/shortness of breath\par

## 2020-10-06 ENCOUNTER — OUTPATIENT (OUTPATIENT)
Dept: OUTPATIENT SERVICES | Facility: HOSPITAL | Age: 80
LOS: 1 days | Discharge: HOME | End: 2020-10-06

## 2020-10-06 ENCOUNTER — APPOINTMENT (OUTPATIENT)
Dept: PODIATRY | Facility: CLINIC | Age: 80
End: 2020-10-06
Payer: MEDICAID

## 2020-10-06 PROCEDURE — 11042 DBRDMT SUBQ TIS 1ST 20SQCM/<: CPT

## 2020-10-14 NOTE — PHYSICAL EXAM
[Ankle Swelling (On Exam)] : present [Ankle Swelling On The Right] : of the right ankle [Varicose Veins Of Lower Extremities] : not present [de-identified] : Pain on light touch to wound site, Right lateral aspect [FreeTextEntry1] : Wound to lateral aspect of Right ankle. Fibrogranular wound base with rolled edge. \par Measurement: 7.5 cm x 4 cm x 0.25 cm \par

## 2020-10-14 NOTE — HISTORY OF PRESENT ILLNESS
[FreeTextEntry1] : Pt presents with re-opening of prior wound to lateral aspect of Right ankle. She reports pain to wound site. \par Pt admits that wound has been present for 2 months\par Unna boot applied at last visit - clean, dry, and intact today\par Pt reports pain today at wound site\par Denies N/V/F/C/shortness of breath\par

## 2020-10-14 NOTE — ASSESSMENT
[FreeTextEntry1] : Venous Stasis Wound, Right Ankle\par \par Patient seen and evaluated \par All findings discussed with patient \par Wound debrided with curette after topical lidocaine application \par Dressed with santyl/dsd/abd/kerlix/coban\par Rx for santyl; Pts daughter to do dressing change\par F/u in 1 week

## 2020-10-19 ENCOUNTER — APPOINTMENT (OUTPATIENT)
Dept: PODIATRY | Facility: CLINIC | Age: 80
End: 2020-10-19
Payer: MEDICAID

## 2020-10-19 ENCOUNTER — OUTPATIENT (OUTPATIENT)
Dept: OUTPATIENT SERVICES | Facility: HOSPITAL | Age: 80
LOS: 1 days | Discharge: HOME | End: 2020-10-19

## 2020-10-19 DIAGNOSIS — L97.519 NON-PRESSURE CHRONIC ULCER OF OTHER PART OF RIGHT FOOT WITH UNSPECIFIED SEVERITY: ICD-10-CM

## 2020-10-19 DIAGNOSIS — I73.9 PERIPHERAL VASCULAR DISEASE, UNSPECIFIED: ICD-10-CM

## 2020-10-19 DIAGNOSIS — I83.009 VARICOSE VEINS OF UNSPECIFIED LOWER EXTREMITY WITH ULCER OF UNSPECIFIED SITE: ICD-10-CM

## 2020-10-19 DIAGNOSIS — I83.013 VARICOSE VEINS OF RIGHT LOWER EXTREMITY WITH ULCER OF ANKLE: ICD-10-CM

## 2020-10-19 PROCEDURE — 99213 OFFICE O/P EST LOW 20 MIN: CPT | Mod: 25

## 2020-10-19 PROCEDURE — 11042 DBRDMT SUBQ TIS 1ST 20SQCM/<: CPT

## 2020-10-21 ENCOUNTER — INPATIENT (INPATIENT)
Facility: HOSPITAL | Age: 80
LOS: 8 days | Discharge: HOME | End: 2020-10-30
Attending: INTERNAL MEDICINE | Admitting: INTERNAL MEDICINE
Payer: MEDICAID

## 2020-10-21 VITALS
SYSTOLIC BLOOD PRESSURE: 138 MMHG | HEART RATE: 156 BPM | RESPIRATION RATE: 22 BRPM | DIASTOLIC BLOOD PRESSURE: 71 MMHG | HEIGHT: 66 IN | TEMPERATURE: 99 F | OXYGEN SATURATION: 98 %

## 2020-10-21 DIAGNOSIS — R09.89 OTHER SPECIFIED SYMPTOMS AND SIGNS INVOLVING THE CIRCULATORY AND RESPIRATORY SYSTEMS: ICD-10-CM

## 2020-10-21 DIAGNOSIS — N18.30 CHRONIC KIDNEY DISEASE, STAGE 3 UNSPECIFIED: ICD-10-CM

## 2020-10-21 DIAGNOSIS — I83.013 VARICOSE VEINS OF RIGHT LOWER EXTREMITY WITH ULCER OF ANKLE: ICD-10-CM

## 2020-10-21 DIAGNOSIS — E03.9 HYPOTHYROIDISM, UNSPECIFIED: ICD-10-CM

## 2020-10-21 DIAGNOSIS — M17.0 BILATERAL PRIMARY OSTEOARTHRITIS OF KNEE: ICD-10-CM

## 2020-10-21 DIAGNOSIS — Z79.01 LONG TERM (CURRENT) USE OF ANTICOAGULANTS: ICD-10-CM

## 2020-10-21 DIAGNOSIS — I50.33 ACUTE ON CHRONIC DIASTOLIC (CONGESTIVE) HEART FAILURE: ICD-10-CM

## 2020-10-21 DIAGNOSIS — L97.319 NON-PRESSURE CHRONIC ULCER OF RIGHT ANKLE WITH UNSPECIFIED SEVERITY: ICD-10-CM

## 2020-10-21 DIAGNOSIS — I13.0 HYPERTENSIVE HEART AND CHRONIC KIDNEY DISEASE WITH HEART FAILURE AND STAGE 1 THROUGH STAGE 4 CHRONIC KIDNEY DISEASE, OR UNSPECIFIED CHRONIC KIDNEY DISEASE: ICD-10-CM

## 2020-10-21 DIAGNOSIS — I25.10 ATHEROSCLEROTIC HEART DISEASE OF NATIVE CORONARY ARTERY WITHOUT ANGINA PECTORIS: ICD-10-CM

## 2020-10-21 DIAGNOSIS — I48.20 CHRONIC ATRIAL FIBRILLATION, UNSPECIFIED: ICD-10-CM

## 2020-10-21 DIAGNOSIS — I48.91 UNSPECIFIED ATRIAL FIBRILLATION: ICD-10-CM

## 2020-10-21 DIAGNOSIS — E66.01 MORBID (SEVERE) OBESITY DUE TO EXCESS CALORIES: ICD-10-CM

## 2020-10-21 DIAGNOSIS — E78.5 HYPERLIPIDEMIA, UNSPECIFIED: ICD-10-CM

## 2020-10-21 DIAGNOSIS — D50.9 IRON DEFICIENCY ANEMIA, UNSPECIFIED: ICD-10-CM

## 2020-10-21 DIAGNOSIS — G47.33 OBSTRUCTIVE SLEEP APNEA (ADULT) (PEDIATRIC): ICD-10-CM

## 2020-10-21 LAB
ALBUMIN SERPL ELPH-MCNC: 4 G/DL — SIGNIFICANT CHANGE UP (ref 3.5–5.2)
ALP SERPL-CCNC: 112 U/L — SIGNIFICANT CHANGE UP (ref 30–115)
ALT FLD-CCNC: 12 U/L — SIGNIFICANT CHANGE UP (ref 0–41)
ANION GAP SERPL CALC-SCNC: 11 MMOL/L — SIGNIFICANT CHANGE UP (ref 7–14)
APTT BLD: 38.1 SEC — SIGNIFICANT CHANGE UP (ref 27–39.2)
AST SERPL-CCNC: 19 U/L — SIGNIFICANT CHANGE UP (ref 0–41)
BASOPHILS # BLD AUTO: 0.02 K/UL — SIGNIFICANT CHANGE UP (ref 0–0.2)
BASOPHILS NFR BLD AUTO: 0.3 % — SIGNIFICANT CHANGE UP (ref 0–1)
BILIRUB SERPL-MCNC: 0.5 MG/DL — SIGNIFICANT CHANGE UP (ref 0.2–1.2)
BUN SERPL-MCNC: 21 MG/DL — HIGH (ref 10–20)
CALCIUM SERPL-MCNC: 8.9 MG/DL — SIGNIFICANT CHANGE UP (ref 8.5–10.1)
CHLORIDE SERPL-SCNC: 100 MMOL/L — SIGNIFICANT CHANGE UP (ref 98–110)
CO2 SERPL-SCNC: 26 MMOL/L — SIGNIFICANT CHANGE UP (ref 17–32)
CREAT SERPL-MCNC: 1.3 MG/DL — SIGNIFICANT CHANGE UP (ref 0.7–1.5)
EOSINOPHIL # BLD AUTO: 0.09 K/UL — SIGNIFICANT CHANGE UP (ref 0–0.7)
EOSINOPHIL NFR BLD AUTO: 1.4 % — SIGNIFICANT CHANGE UP (ref 0–8)
GLUCOSE SERPL-MCNC: 135 MG/DL — HIGH (ref 70–99)
HCT VFR BLD CALC: 34.7 % — LOW (ref 37–47)
HGB BLD-MCNC: 9.5 G/DL — LOW (ref 12–16)
IMM GRANULOCYTES NFR BLD AUTO: 0.6 % — HIGH (ref 0.1–0.3)
INR BLD: 1.43 RATIO — HIGH (ref 0.65–1.3)
LYMPHOCYTES # BLD AUTO: 1.42 K/UL — SIGNIFICANT CHANGE UP (ref 1.2–3.4)
LYMPHOCYTES # BLD AUTO: 22.9 % — SIGNIFICANT CHANGE UP (ref 20.5–51.1)
MAGNESIUM SERPL-MCNC: 2 MG/DL — SIGNIFICANT CHANGE UP (ref 1.8–2.4)
MCHC RBC-ENTMCNC: 20.6 PG — LOW (ref 27–31)
MCHC RBC-ENTMCNC: 27.4 G/DL — LOW (ref 32–37)
MCV RBC AUTO: 75.1 FL — LOW (ref 81–99)
MONOCYTES # BLD AUTO: 0.72 K/UL — HIGH (ref 0.1–0.6)
MONOCYTES NFR BLD AUTO: 11.6 % — HIGH (ref 1.7–9.3)
NEUTROPHILS # BLD AUTO: 3.92 K/UL — SIGNIFICANT CHANGE UP (ref 1.4–6.5)
NEUTROPHILS NFR BLD AUTO: 63.2 % — SIGNIFICANT CHANGE UP (ref 42.2–75.2)
NRBC # BLD: 0 /100 WBCS — SIGNIFICANT CHANGE UP (ref 0–0)
NT-PROBNP SERPL-SCNC: 3294 PG/ML — HIGH (ref 0–300)
PLATELET # BLD AUTO: 303 K/UL — SIGNIFICANT CHANGE UP (ref 130–400)
POTASSIUM SERPL-MCNC: 4.1 MMOL/L — SIGNIFICANT CHANGE UP (ref 3.5–5)
POTASSIUM SERPL-SCNC: 4.1 MMOL/L — SIGNIFICANT CHANGE UP (ref 3.5–5)
PROT SERPL-MCNC: 7.2 G/DL — SIGNIFICANT CHANGE UP (ref 6–8)
PROTHROM AB SERPL-ACNC: 16.4 SEC — HIGH (ref 9.95–12.87)
RBC # BLD: 4.62 M/UL — SIGNIFICANT CHANGE UP (ref 4.2–5.4)
RBC # FLD: 20 % — HIGH (ref 11.5–14.5)
SODIUM SERPL-SCNC: 137 MMOL/L — SIGNIFICANT CHANGE UP (ref 135–146)
TROPONIN T SERPL-MCNC: <0.01 NG/ML — SIGNIFICANT CHANGE UP
WBC # BLD: 6.21 K/UL — SIGNIFICANT CHANGE UP (ref 4.8–10.8)
WBC # FLD AUTO: 6.21 K/UL — SIGNIFICANT CHANGE UP (ref 4.8–10.8)

## 2020-10-21 PROCEDURE — 99285 EMERGENCY DEPT VISIT HI MDM: CPT

## 2020-10-21 PROCEDURE — 71045 X-RAY EXAM CHEST 1 VIEW: CPT | Mod: 26

## 2020-10-21 RX ORDER — LISINOPRIL 2.5 MG/1
1 TABLET ORAL
Qty: 0 | Refills: 0 | DISCHARGE

## 2020-10-21 RX ORDER — LISINOPRIL 2.5 MG/1
5 TABLET ORAL DAILY
Refills: 0 | Status: DISCONTINUED | OUTPATIENT
Start: 2020-10-21 | End: 2020-10-27

## 2020-10-21 RX ORDER — DILTIAZEM HCL 120 MG
60 CAPSULE, EXT RELEASE 24 HR ORAL ONCE
Refills: 0 | Status: COMPLETED | OUTPATIENT
Start: 2020-10-21 | End: 2020-10-21

## 2020-10-21 RX ORDER — ATORVASTATIN CALCIUM 80 MG/1
1 TABLET, FILM COATED ORAL
Qty: 0 | Refills: 0 | DISCHARGE

## 2020-10-21 RX ORDER — METOPROLOL TARTRATE 50 MG
25 TABLET ORAL
Refills: 0 | Status: DISCONTINUED | OUTPATIENT
Start: 2020-10-21 | End: 2020-10-21

## 2020-10-21 RX ORDER — SODIUM CHLORIDE 9 MG/ML
250 INJECTION INTRAMUSCULAR; INTRAVENOUS; SUBCUTANEOUS ONCE
Refills: 0 | Status: COMPLETED | OUTPATIENT
Start: 2020-10-21 | End: 2020-10-21

## 2020-10-21 RX ORDER — FUROSEMIDE 40 MG
1 TABLET ORAL
Qty: 0 | Refills: 0 | DISCHARGE

## 2020-10-21 RX ORDER — LEVOTHYROXINE SODIUM 125 MCG
75 TABLET ORAL DAILY
Refills: 0 | Status: DISCONTINUED | OUTPATIENT
Start: 2020-10-21 | End: 2020-10-30

## 2020-10-21 RX ORDER — DILTIAZEM HCL 120 MG
10 CAPSULE, EXT RELEASE 24 HR ORAL ONCE
Refills: 0 | Status: DISCONTINUED | OUTPATIENT
Start: 2020-10-21 | End: 2020-10-21

## 2020-10-21 RX ORDER — APIXABAN 2.5 MG/1
5 TABLET, FILM COATED ORAL EVERY 12 HOURS
Refills: 0 | Status: DISCONTINUED | OUTPATIENT
Start: 2020-10-21 | End: 2020-10-30

## 2020-10-21 RX ORDER — FUROSEMIDE 40 MG
60 TABLET ORAL DAILY
Refills: 0 | Status: DISCONTINUED | OUTPATIENT
Start: 2020-10-21 | End: 2020-10-22

## 2020-10-21 RX ORDER — METOPROLOL TARTRATE 50 MG
50 TABLET ORAL
Refills: 0 | Status: DISCONTINUED | OUTPATIENT
Start: 2020-10-21 | End: 2020-10-22

## 2020-10-21 RX ORDER — ASPIRIN/CALCIUM CARB/MAGNESIUM 324 MG
1 TABLET ORAL
Qty: 0 | Refills: 0 | DISCHARGE

## 2020-10-21 RX ORDER — CHLORHEXIDINE GLUCONATE 213 G/1000ML
1 SOLUTION TOPICAL
Refills: 0 | Status: DISCONTINUED | OUTPATIENT
Start: 2020-10-21 | End: 2020-10-30

## 2020-10-21 RX ORDER — DILTIAZEM HCL 120 MG
10 CAPSULE, EXT RELEASE 24 HR ORAL ONCE
Refills: 0 | Status: COMPLETED | OUTPATIENT
Start: 2020-10-21 | End: 2020-10-21

## 2020-10-21 RX ORDER — METOPROLOL TARTRATE 50 MG
0 TABLET ORAL
Qty: 0 | Refills: 0 | DISCHARGE

## 2020-10-21 RX ADMIN — SODIUM CHLORIDE 250 MILLILITER(S): 9 INJECTION INTRAMUSCULAR; INTRAVENOUS; SUBCUTANEOUS at 13:40

## 2020-10-21 RX ADMIN — Medication 10 MILLIGRAM(S): at 13:39

## 2020-10-21 RX ADMIN — Medication 60 MILLIGRAM(S): at 15:45

## 2020-10-21 RX ADMIN — Medication 60 MILLIGRAM(S): at 20:47

## 2020-10-21 NOTE — ED PROVIDER NOTE - NS ED ROS FT
Review of Systems:  	•	CONSTITUTIONAL - no fever, no diaphoresis, no chills  	•	SKIN - no rash  	•	HEMATOLOGIC - no bleeding, no bruising  	•	EYES - no eye pain, no blurry vision  	•	ENT - no congestion  	•	RESPIRATORY - + shortness of breath, no cough  	•	CARDIAC - no chest pain, + palpitations  	•	GI - no abd pain, no nausea, no vomiting, no diarrhea, no constipation  	•	GENITO-URINARY - no dysuria; no hematuria, no increased urinary frequency  	•	MUSCULOSKELETAL - no joint paint, no swelling, no redness  	•	NEUROLOGIC - no weakness, no headache, no paresthesias, no LOC  	•	PSYCH - no anxiety, no depression  	All other ROS are negative except as documented in HPI.

## 2020-10-21 NOTE — ED ADULT NURSE NOTE - OBJECTIVE STATEMENT
Pt is A and O x3, c/o SOB and palpitations. Pt denies chest pain, dizziness, weakness, N/V/D. No acute distress noted.

## 2020-10-21 NOTE — H&P ADULT - ATTENDING COMMENTS
Patient seen and examined independently. I agree with the resident's note, physical exam, and plan except as below.  Vital Signs Last 24 Hrs  T(C): 36.8 (22 Oct 2020 12:43), Max: 36.8 (22 Oct 2020 12:43)  T(F): 98.3 (22 Oct 2020 12:43), Max: 98.3 (22 Oct 2020 12:43)  HR: 115 (22 Oct 2020 12:43) (67 - 149)  BP: 116/72 (22 Oct 2020 12:43) (108/62 - 135/83)  BP(mean): --  RR: 18 (22 Oct 2020 12:43) (18 - 20)  SpO2: 98% (22 Oct 2020 06:51) (97% - 99%)  Pe  nad  aaox3  morbidly obese  [id number] irreg mildly tachy  bilat air entry + crackles,  no wheeze  softntnd+bs  +edema bilat    #acute on chronic diastolic CHF - likely due to rapid afib- cont iv lasix   #Afib with RVR= sp cardizem drip - changed to po cardizem , HR improved  - echo echo  #morbid obesity     SOb improved -  used  #[id number]

## 2020-10-21 NOTE — ED PROVIDER NOTE - ATTENDING CONTRIBUTION TO CARE
shortness of breath, afib with RVR, h;/o CHF.  now feeling worsening palpitations  HR 150s.  better controlled with diltiazem IV then PO.  now feeling better. will admit for acute decompensated CHF and rapid afib.  patient on elloquis.

## 2020-10-21 NOTE — ED PROVIDER NOTE - OBJECTIVE STATEMENT
78 yo F with pmhx of Swathi on eliquis, CHF presenting with shortness of breath and palpitations for a few days. Symptoms are moderate. No alleviating/aggravating factors. No cp, fever, chills, abdominal pain, nausea, vomiting, diarrhea, back pain, urinary symptoms, headache, dizziness,  paresthesias, or weakness.

## 2020-10-21 NOTE — H&P ADULT - NSHPPHYSICALEXAM_GEN_ALL_CORE
General L lying in bed comfortable, obese  EYES: EOMI, PERRLA, conjunctiva and sclera clear  ENT: Moist mucous membranes  NECK: no JVD  CHEST/LUNG: bilateral crackles, labored breathing   HEART: tachycardiac , irregular   ABDOMEN: Bowel sounds present; Soft, Nontender, Nondistended.   EXTREMITIES:  2+ Peripheral Pulses, brisk capillary refill. No clubbing, lower ext edema +2  NERVOUS SYSTEM:  Alert & Oriented X3, speech clear. No deficits   MSK: FROM all 4 extremities, full and equal strength  SKIN: No rashes or lesions

## 2020-10-21 NOTE — H&P ADULT - HISTORY OF PRESENT ILLNESS
78 y/o female with hx CAD, CHpEF,DLD, DM,  sever bilateral knee MAUREEN, Sever MAUREEN Hypothyroid presents to the ED for Shortness of breath/          In the ED Pt was Hd stable,,  and irregular, 10 IV Cardizem and 60 PO with improvement of HR, 250 CC IVF Pro- bnp 3300, CXR showed pulmonary congestion.  , 80 y/o female Mal[place]ian speaking with hx CAD, CHpEF,DLD, AFib on Eliquis,  sever bilateral knee MAUREEN, Sever MAUREEN not on CPAP,  Hypothyroid presents to the ED for Shortness of breath for the last 2 months.  Patient is having worsening shortness of breath on exertion, lower ext swelling, Mild PND, she sleeps on 3 pillows. Denied any chest pain, palpations, N/V/D, GI or urinary Symptoms. Patient is compliant with medications and diet. she lives at home with daughter and can walk around home.   Patient follows with Dr. Berger, she was [place] by pulmonary before for MAUREEN but she is not on CPAP.       In the ED Pt was Hd stable,,  and irregular, 10 IV Cardizem and 60 PO with improvement of HR, 250 CC IVF Pro- bnp 3300, CXR showed pulmonary congestion.  , 80 y/o female Citizen of [place] speaking with hx  CHpEF,DLD, AFib on Eliquis, sever bilateral knee MAUREEN, Sever MAUREEN not on CPAP, morbid obesity,  Hypothyroid presents to the ED for Shortness of breath for the last 2 months.  Patient is having worsening shortness of breath on exertion, lower ext swelling, Mild PND, she sleeps on 3 pillows. Denied any chest pain, palpations, N/V/D, GI or urinary Symptoms. Patient is compliant with medications and diet. she lives at home with daughter and can walk around home.   Patient follows with Dr. Berger, she was [place] by pulmonary before for MAUREEN but she is not on CPAP.       In the ED Pt was Hd stable,,  and irregular, 10 IV Cardizem and 60 PO with improvement of HR, 250 CC IVF Pro- bnp 3300, CXR showed pulmonary congestion.  ,

## 2020-10-21 NOTE — H&P ADULT - NSHPLABSRESULTS_GEN_ALL_CORE
9.5    6.21  )-----------( 303      ( 21 Oct 2020 13:33 )             34.7       10-21    137  |  100  |  21<H>  ----------------------------<  135<H>  4.1   |  26  |  1.3    Ca    8.9      21 Oct 2020 13:55  Mg     2.0     10-21    TPro  7.2  /  Alb  4.0  /  TBili  0.5  /  DBili  x   /  AST  19  /  ALT  12  /  AlkPhos  112  10-21            PT/INR - ( 21 Oct 2020 13:33 )   PT: 16.40 sec;   INR: 1.43 ratio         PTT - ( 21 Oct 2020 13:33 )  PTT:38.1 sec    CARDIAC MARKERS ( 21 Oct 2020 13:33 )  x     / <0.01 ng/mL / x     / x     / x

## 2020-10-21 NOTE — ED PROVIDER NOTE - PHYSICAL EXAMINATION
VITAL SIGNS: I have reviewed nursing notes and confirm.  CONSTITUTIONAL: Well-developed; well-nourished; in no acute distress.  SKIN: Skin exam is warm and dry, no acute rash.  HEAD: Normocephalic; atraumatic.  EYES: PERRL, EOM intact; conjunctiva and sclera clear.  ENT: No nasal discharge; airway clear.   NECK: Supple; non tender.  CARD: S1, S2 normal; no murmurs, gallops, or rubs. Irregularly irregular rate and rhythm.  RESP: Clear to auscultation bilaterally. No wheezes, rales or rhonchi.  ABD: Normal bowel sounds; soft; non-distended; non-tender.   EXT: Normal ROM. +Bilateral LE edema.   LYMPH: No acute cervical adenopathy.  NEURO: Alert, oriented. Grossly unremarkable. No focal deficits.  PSYCH: Cooperative, appropriate.

## 2020-10-21 NOTE — H&P ADULT - ASSESSMENT
80 y/o female with hx CAD, CHpEF, sever bilateral knee MAUREEN, DLD, DM Sever MAUREEN Hypothyroid presents to the ED for Shortness of breath/      # Acute on chronic Diastolic dysfunction 2/2 Afib with RVR, CHADSCVASC   - C/w Eliquis   - C/w Cardizem   - Cardiology consult, Dr. Nupur Berger   - Echo showing grade diastolic 1 dysfunction  - Will repeat Echo  - TSH  - Will start lasix     #CAD c/w  #Hypothyroid c/w , f/u TSH  # Sever MAUREEN  # SEver bilateral knee osteoarthritis   #CKD stage 3 baseline creatinine 1.3- stable  #Microcytic anemia:  f/u iron studies     Activity as tolerated   Diet DASH/ TLC, fluid restriction   DVT PPX Eliquis   GI ppx not indicated   Dispo from home   Code full   80 y/o female Northern Irish speaking with hx CAD, CHpEF,DLD, AFib on Eliquis,  sever bilateral knee MAUREEN, Sever MAUREEN not on CPAP,  Hypothyroid presents to the ED for Shortness of breath for the last 2 months.      # Acute on chronic Diastolic dysfunction ( - Echo showing grade diastolic 1 dysfunction from 2019, normal EF )  # Afib with RVR  - PE, CXR and Probnp consistent with exacerbation of CHFpEF   - S/p Cardizam 10 IV and 60 PO in the ED   - C/w Eliquis   - c/w metoprolol tartare 50 BID, home dose is 25 BID, HR still not controlled  - Cardiology consult, Dr. Nupur Berger   - Will repeat Echo  - f/u TSH  - Will start Lasix 60 IV daily, home dose is 40 BID PO  - Strict input / output     #CAD ? c/w metoprolol, lisinopril   #Hypothyroid c/w synthroid , f/u TSH  # Sever MAUREEN not on CPAP  # hx Sever bilateral knee osteoarthritis   #CKD stage 3 baseline creatinine 1.3- stable  #Microcytic anemia:  f/u iron studies  # HTN c/w lisinopril     Activity as tolerated   Diet DASH/ TLC, fluid restriction   DVT PPX Eliquis   GI ppx not indicated   Dispo from home   Code full   80 y/o female Lithuanian speaking with hx CAD, CHpEF,, AFib on Eliquis,  sever bilateral knee MAUREEN, Sever MAUREEN not on CPAP, morbid obesity,   Hypothyroid presents to the ED for Shortness of breath for the last 2 months.      # Acute on chronic Diastolic dysfunction ( - Echo showing grade diastolic 1 dysfunction from 2019, normal EF )  # Afib with RVR  - PE, CXR and Probnp consistent with exacerbation of CHFpEF   - S/p Cardizam 10 IV and 60 PO in the ED   - C/w Eliquis   - c/w metoprolol tartare 50 BID, home dose is 25 BID, HR still not controlled  - Cardiology consult, Dr. Nupur Berger   - Will repeat Echo  - f/u TSH  - Will start Lasix 60 IV daily, home dose is 40 BID PO  - Strict input / output     #CAD ? c/w metoprolol, lisinopril   #Hypothyroid c/w synthroid , f/u TSH  # Sever MAUREEN not on CPAP  # hx Sever bilateral knee osteoarthritis   #CKD stage 3 baseline creatinine 1.3- stable  #Microcytic anemia:  f/u iron studies  # HTN c/w lisinopril   #Morbid obesity, need counseling on weight loss    Activity as tolerated   Diet DASH/ TLC, fluid restriction   DVT PPX Eliquis   GI ppx not indicated   Dispo from home   Code full

## 2020-10-21 NOTE — ED ADULT NURSE REASSESSMENT NOTE - NS ED NURSE REASSESS COMMENT FT1
Pt. assessed, VSS. AAO x4; on room air. denies chest pain or chest discomfort. on continuos cardiac monitoring; IV patent and intact. safety measures in place. will continue to assess and monitor.

## 2020-10-21 NOTE — H&P ADULT - NSICDXPASTMEDICALHX_GEN_ALL_CORE_FT
PAST MEDICAL HISTORY:  Abnormal cholesterol test     Acquired cataract     Acquired hypothyroidism     Acute CHF     H/O: HTN (hypertension)     History of chronic CHF     Obesity, mild

## 2020-10-22 LAB
ALBUMIN SERPL ELPH-MCNC: 3.8 G/DL — SIGNIFICANT CHANGE UP (ref 3.5–5.2)
ALP SERPL-CCNC: 111 U/L — SIGNIFICANT CHANGE UP (ref 30–115)
ALT FLD-CCNC: 12 U/L — SIGNIFICANT CHANGE UP (ref 0–41)
ANION GAP SERPL CALC-SCNC: 13 MMOL/L — SIGNIFICANT CHANGE UP (ref 7–14)
AST SERPL-CCNC: 18 U/L — SIGNIFICANT CHANGE UP (ref 0–41)
BASOPHILS # BLD AUTO: 0.02 K/UL — SIGNIFICANT CHANGE UP (ref 0–0.2)
BASOPHILS NFR BLD AUTO: 0.3 % — SIGNIFICANT CHANGE UP (ref 0–1)
BILIRUB SERPL-MCNC: 0.5 MG/DL — SIGNIFICANT CHANGE UP (ref 0.2–1.2)
BUN SERPL-MCNC: 19 MG/DL — SIGNIFICANT CHANGE UP (ref 10–20)
CALCIUM SERPL-MCNC: 8.9 MG/DL — SIGNIFICANT CHANGE UP (ref 8.5–10.1)
CHLORIDE SERPL-SCNC: 101 MMOL/L — SIGNIFICANT CHANGE UP (ref 98–110)
CO2 SERPL-SCNC: 29 MMOL/L — SIGNIFICANT CHANGE UP (ref 17–32)
CREAT SERPL-MCNC: 1.2 MG/DL — SIGNIFICANT CHANGE UP (ref 0.7–1.5)
EOSINOPHIL # BLD AUTO: 0.15 K/UL — SIGNIFICANT CHANGE UP (ref 0–0.7)
EOSINOPHIL NFR BLD AUTO: 1.9 % — SIGNIFICANT CHANGE UP (ref 0–8)
FERRITIN SERPL-MCNC: 9 NG/ML — LOW (ref 15–150)
GLUCOSE SERPL-MCNC: 104 MG/DL — HIGH (ref 70–99)
HCT VFR BLD CALC: 34.5 % — LOW (ref 37–47)
HGB BLD-MCNC: 9.6 G/DL — LOW (ref 12–16)
IMM GRANULOCYTES NFR BLD AUTO: 0.4 % — HIGH (ref 0.1–0.3)
IRON SATN MFR SERPL: 19 UG/DL — LOW (ref 35–150)
IRON SATN MFR SERPL: 5 % — LOW (ref 15–50)
LYMPHOCYTES # BLD AUTO: 1.29 K/UL — SIGNIFICANT CHANGE UP (ref 1.2–3.4)
LYMPHOCYTES # BLD AUTO: 16.6 % — LOW (ref 20.5–51.1)
MAGNESIUM SERPL-MCNC: 2 MG/DL — SIGNIFICANT CHANGE UP (ref 1.8–2.4)
MCHC RBC-ENTMCNC: 20.7 PG — LOW (ref 27–31)
MCHC RBC-ENTMCNC: 27.8 G/DL — LOW (ref 32–37)
MCV RBC AUTO: 74.5 FL — LOW (ref 81–99)
MONOCYTES # BLD AUTO: 0.74 K/UL — HIGH (ref 0.1–0.6)
MONOCYTES NFR BLD AUTO: 9.5 % — HIGH (ref 1.7–9.3)
NEUTROPHILS # BLD AUTO: 5.56 K/UL — SIGNIFICANT CHANGE UP (ref 1.4–6.5)
NEUTROPHILS NFR BLD AUTO: 71.3 % — SIGNIFICANT CHANGE UP (ref 42.2–75.2)
NRBC # BLD: 0 /100 WBCS — SIGNIFICANT CHANGE UP (ref 0–0)
PLATELET # BLD AUTO: 283 K/UL — SIGNIFICANT CHANGE UP (ref 130–400)
POTASSIUM SERPL-MCNC: 4 MMOL/L — SIGNIFICANT CHANGE UP (ref 3.5–5)
POTASSIUM SERPL-SCNC: 4 MMOL/L — SIGNIFICANT CHANGE UP (ref 3.5–5)
PROT SERPL-MCNC: 6.8 G/DL — SIGNIFICANT CHANGE UP (ref 6–8)
RBC # BLD: 4.63 M/UL — SIGNIFICANT CHANGE UP (ref 4.2–5.4)
RBC # FLD: 19.9 % — HIGH (ref 11.5–14.5)
SARS-COV-2 RNA SPEC QL NAA+PROBE: SIGNIFICANT CHANGE UP
SODIUM SERPL-SCNC: 143 MMOL/L — SIGNIFICANT CHANGE UP (ref 135–146)
TIBC SERPL-MCNC: 410 UG/DL — SIGNIFICANT CHANGE UP (ref 220–430)
TRANSFERRIN SERPL-MCNC: 348 MG/DL — SIGNIFICANT CHANGE UP (ref 200–360)
TROPONIN T SERPL-MCNC: <0.01 NG/ML — SIGNIFICANT CHANGE UP
TSH SERPL-MCNC: 3.64 UIU/ML — SIGNIFICANT CHANGE UP (ref 0.27–4.2)
UIBC SERPL-MCNC: 391 UG/DL — HIGH (ref 110–370)
WBC # BLD: 7.79 K/UL — SIGNIFICANT CHANGE UP (ref 4.8–10.8)
WBC # FLD AUTO: 7.79 K/UL — SIGNIFICANT CHANGE UP (ref 4.8–10.8)

## 2020-10-22 PROCEDURE — 99223 1ST HOSP IP/OBS HIGH 75: CPT

## 2020-10-22 PROCEDURE — 93306 TTE W/DOPPLER COMPLETE: CPT | Mod: 26

## 2020-10-22 PROCEDURE — 99222 1ST HOSP IP/OBS MODERATE 55: CPT

## 2020-10-22 RX ORDER — DILTIAZEM HCL 120 MG
10 CAPSULE, EXT RELEASE 24 HR ORAL ONCE
Refills: 0 | Status: COMPLETED | OUTPATIENT
Start: 2020-10-22 | End: 2020-10-22

## 2020-10-22 RX ORDER — METOPROLOL TARTRATE 50 MG
5 TABLET ORAL ONCE
Refills: 0 | Status: COMPLETED | OUTPATIENT
Start: 2020-10-22 | End: 2020-10-22

## 2020-10-22 RX ORDER — FUROSEMIDE 40 MG
60 TABLET ORAL EVERY 12 HOURS
Refills: 0 | Status: DISCONTINUED | OUTPATIENT
Start: 2020-10-22 | End: 2020-10-23

## 2020-10-22 RX ORDER — METOPROLOL TARTRATE 50 MG
50 TABLET ORAL
Refills: 0 | Status: DISCONTINUED | OUTPATIENT
Start: 2020-10-22 | End: 2020-10-26

## 2020-10-22 RX ORDER — DILTIAZEM HCL 120 MG
30 CAPSULE, EXT RELEASE 24 HR ORAL EVERY 6 HOURS
Refills: 0 | Status: DISCONTINUED | OUTPATIENT
Start: 2020-10-22 | End: 2020-10-27

## 2020-10-22 RX ORDER — METOPROLOL TARTRATE 50 MG
50 TABLET ORAL EVERY 6 HOURS
Refills: 0 | Status: DISCONTINUED | OUTPATIENT
Start: 2020-10-22 | End: 2020-10-22

## 2020-10-22 RX ADMIN — Medication 5 MILLIGRAM(S): at 02:21

## 2020-10-22 RX ADMIN — Medication 5 MILLIGRAM(S): at 06:43

## 2020-10-22 RX ADMIN — Medication 50 MILLIGRAM(S): at 06:38

## 2020-10-22 RX ADMIN — Medication 10 MILLIGRAM(S): at 00:37

## 2020-10-22 RX ADMIN — APIXABAN 5 MILLIGRAM(S): 2.5 TABLET, FILM COATED ORAL at 06:38

## 2020-10-22 RX ADMIN — APIXABAN 5 MILLIGRAM(S): 2.5 TABLET, FILM COATED ORAL at 18:14

## 2020-10-22 RX ADMIN — CHLORHEXIDINE GLUCONATE 1 APPLICATION(S): 213 SOLUTION TOPICAL at 06:39

## 2020-10-22 RX ADMIN — Medication 30 MILLIGRAM(S): at 18:13

## 2020-10-22 RX ADMIN — Medication 30 MILLIGRAM(S): at 11:39

## 2020-10-22 RX ADMIN — Medication 75 MICROGRAM(S): at 06:38

## 2020-10-22 RX ADMIN — Medication 50 MILLIGRAM(S): at 18:14

## 2020-10-22 RX ADMIN — Medication 30 MILLIGRAM(S): at 23:07

## 2020-10-22 RX ADMIN — Medication 60 MILLIGRAM(S): at 18:13

## 2020-10-22 RX ADMIN — LISINOPRIL 5 MILLIGRAM(S): 2.5 TABLET ORAL at 06:38

## 2020-10-22 NOTE — CONSULT NOTE ADULT - SUBJECTIVE AND OBJECTIVE BOX
HPI:   ID# [id number]   79 y.o female patient with PMH of HpEF, DLD, AFib on Eliquis, OA, MAUREEN not on CPAP, morbid obesity, hypothyroidism presents to the ED for shortness of breath for the last month.  History goes back to a month prior to presentation when then patient starting having progressively worsening shortness of breath on exertion, lower ext swelling, PND, and orthopnea. This shortness of breath was associated with palpitations. She denies any chest pain, palpations, N/V/D, GI or urinary Symptoms. Patient reports being compliant with medications and diet.    In the ED Pt was Hd stable,,  and irregular, 10 IV Cardizem and 60 PO with improvement of HR, 250 CC IVF Pro- bnp 3300, CXR showed pulmonary congestion.  ,  (21 Oct 2020 19:38)      PAST MEDICAL & SURGICAL HISTORY  Abnormal cholesterol test    Acquired cataract    Acute CHF    Acquired hypothyroidism    H/O: HTN (hypertension)    Obesity, mild    History of chronic CHF        FAMILY HISTORY:  FAMILY HISTORY:      SOCIAL HISTORY:  []smoker: never  []Alcohol: none  []Drug: none    ALLERGIES:  No Known Allergies      MEDICATIONS:  MEDICATIONS  (STANDING):  apixaban 5 milliGRAM(s) Oral every 12 hours  chlorhexidine 4% Liquid 1 Application(s) Topical <User Schedule>  furosemide   Injectable 60 milliGRAM(s) IV Push daily  levothyroxine 75 MICROGram(s) Oral daily  lisinopril 5 milliGRAM(s) Oral daily  metoprolol tartrate 50 milliGRAM(s) Oral two times a day    MEDICATIONS  (PRN):      HOME MEDICATIONS:  Home Medications:  Eliquis 5 mg oral tablet: 1 tab(s) orally 2 times a day (21 Oct 2020 20:27)  Lasix 40 mg oral tablet: 1 tab(s) orally 2 times a day (21 Oct 2020 20:27)  lisinopril 5 mg oral tablet: 1 tab(s) orally once a day (21 Oct 2020 20:27)  metoprolol tartrate 25 mg oral tablet: 1 tab(s) orally 2 times a day (21 Oct 2020 20:27)  Synthroid 75 mcg (0.075 mg) oral tablet: 1 tab(s) orally once a day (21 Oct 2020 20:27)      VITALS:   T(F): 97.8 (10-21 @ 23:42), Max: 98.7 (10-21 @ 12:51)  HR: 149 (10-22 @ 02:21) (67 - 156)  BP: 135/83 (10-22 @ 02:21) (98/61 - 138/71)  BP(mean): --  RR: 18 (10-21 @ 23:42) (18 - 22)  SpO2: 98% (10-21 @ 23:42) (97% - 99%)    I&O's Summary    21 Oct 2020 07:01  -  22 Oct 2020 05:34  --------------------------------------------------------  IN: 0 mL / OUT: 3000 mL / NET: -3000 mL        REVIEW OF SYSTEMS:  CONSTITUTIONAL: No weakness, fevers or chills  EYES: No visual changes  ENT: No vertigo or throat pain   NECK: No pain or stiffness  RESPIRATORY: Shortness of breath as described in HPI  CARDIOVASCULAR: Palpitations as described in HPI  GASTROINTESTINAL: No abdominal or epigastric pain. No nausea, vomiting, or hematemesis; No diarrhea or constipation. No melena or hematochezia.  GENITOURINARY: No dysuria, frequency or hematuria  NEUROLOGICAL: No numbness or weakness  SKIN: No itching, no rashes  MSK: No pain    PHYSICAL EXAM:  NEURO: patient is awake , alert and oriented  GEN: Not in acute distress  NECK: no thyroid enlargement, no JVD  LUNGS: Bibasilar crackles heard  CARDIOVASCULAR: S1/S2 present, irregularly irregular rhythm and tachycardia  ABD: Soft, non-tender, non-distended  EXT: 2+ bilateral LE edema  SKIN: Intact    LABS:                        9.5    6.21  )-----------( 303      ( 21 Oct 2020 13:33 )             34.7     10-21    137  |  100  |  21<H>  ----------------------------<  135<H>  4.1   |  26  |  1.3    Ca    8.9      21 Oct 2020 13:55  Mg     2.0     10-21    TPro  7.2  /  Alb  4.0  /  TBili  0.5  /  DBili  x   /  AST  19  /  ALT  12  /  AlkPhos  112  10-21    PT/INR - ( 21 Oct 2020 13:33 )   PT: 16.40 sec;   INR: 1.43 ratio         PTT - ( 21 Oct 2020 13:33 )  PTT:38.1 sec  Troponin T, Serum: <0.01 ng/mL (10-21-20 @ 13:33)    CARDIAC MARKERS ( 21 Oct 2020 13:33 )  x     / <0.01 ng/mL / x     / x     / x        Troponin trend:    Serum Pro-Brain Natriuretic Peptide: 3294 pg/mL (10-21-20 @ 13:33)          RADIOLOGY:  -CXR:    < from: Xray Chest 1 View-PORTABLE IMMEDIATE (Xray Chest 1 View-PORTABLE IMMEDIATE .) (10.21.20 @ 14:53) >  Impression:    Pulmonary vessel congestion. Overall unchanged exam    < end of copied text >      -TTE:    < from: Transthoracic Echocardiogram (07.17.19 @ 09:04) >  Summary:   1. Left ventricular ejection fraction, by visual estimation, is 55 to   60%.   2. Spectral Doppler shows impaired relaxation pattern of left   ventricular myocardial filling (Grade I diastolic dysfunction).   3. Mild aortic regurgitation.   4. Sclerotic aortic valve with decreased opening.    < end of copied text >    -CCTA:  -STRESS TEST:  -CATHETERIZATION:    ECG: atrial fibrillation with RVR    TELEMETRY EVENTS:   HPI:   ID# [id number]  79 y.o female patient with PMH of HpEF, DLD, AFib on Eliquis, OA, MAUREEN not on CPAP, morbid obesity, hypothyroidism presents to the ED for shortness of breath for the last month.  History goes back to a month prior to presentation when then patient starting having progressively worsening shortness of breath on exertion, lower ext swelling, PND, and orthopnea. This shortness of breath was associated with palpitations. She denies any chest pain, palpations, N/V/D, GI or urinary Symptoms. Patient reports being compliant with medications and diet.    In the ED Pt was Hd stable,,  and irregular, 10 IV Cardizem and 60 PO with improvement of HR, 250 CC IVF Pro- bnp 3300, CXR showed pulmonary congestion.  ,  (21 Oct 2020 19:38)      PAST MEDICAL & SURGICAL HISTORY  Abnormal cholesterol test    Acquired cataract    Acute CHF    Acquired hypothyroidism    H/O: HTN (hypertension)    Obesity, mild    History of chronic CHF        FAMILY HISTORY:  FAMILY HISTORY:      SOCIAL HISTORY:  []smoker: never  []Alcohol: none  []Drug: none    ALLERGIES:  No Known Allergies      MEDICATIONS:  MEDICATIONS  (STANDING):  apixaban 5 milliGRAM(s) Oral every 12 hours  chlorhexidine 4% Liquid 1 Application(s) Topical <User Schedule>  furosemide   Injectable 60 milliGRAM(s) IV Push daily  levothyroxine 75 MICROGram(s) Oral daily  lisinopril 5 milliGRAM(s) Oral daily  metoprolol tartrate 50 milliGRAM(s) Oral two times a day    MEDICATIONS  (PRN):      HOME MEDICATIONS:  Home Medications:  Eliquis 5 mg oral tablet: 1 tab(s) orally 2 times a day (21 Oct 2020 20:27)  Lasix 40 mg oral tablet: 1 tab(s) orally 2 times a day (21 Oct 2020 20:27)  lisinopril 5 mg oral tablet: 1 tab(s) orally once a day (21 Oct 2020 20:27)  metoprolol tartrate 25 mg oral tablet: 1 tab(s) orally 2 times a day (21 Oct 2020 20:27)  Synthroid 75 mcg (0.075 mg) oral tablet: 1 tab(s) orally once a day (21 Oct 2020 20:27)      VITALS:   T(F): 97.8 (10-21 @ 23:42), Max: 98.7 (10-21 @ 12:51)  HR: 149 (10-22 @ 02:21) (67 - 156)  BP: 135/83 (10-22 @ 02:21) (98/61 - 138/71)  BP(mean): --  RR: 18 (10-21 @ 23:42) (18 - 22)  SpO2: 98% (10-21 @ 23:42) (97% - 99%)    I&O's Summary    21 Oct 2020 07:01  -  22 Oct 2020 05:34  --------------------------------------------------------  IN: 0 mL / OUT: 3000 mL / NET: -3000 mL        REVIEW OF SYSTEMS:  CONSTITUTIONAL: No weakness, fevers or chills  EYES: No visual changes  ENT: No vertigo or throat pain   NECK: No pain or stiffness  RESPIRATORY: Shortness of breath as described in HPI  CARDIOVASCULAR: Palpitations as described in HPI  GASTROINTESTINAL: No abdominal or epigastric pain. No nausea, vomiting, or hematemesis; No diarrhea or constipation. No melena or hematochezia.  GENITOURINARY: No dysuria, frequency or hematuria  NEUROLOGICAL: No numbness or weakness  SKIN: No itching, no rashes  MSK: No pain    PHYSICAL EXAM:  NEURO: patient is awake , alert and oriented  GEN: Not in acute distress  NECK: no thyroid enlargement, no JVD  LUNGS: Bibasilar crackles heard  CARDIOVASCULAR: S1/S2 present, irregularly irregular rhythm and tachycardia  ABD: Soft, non-tender, non-distended  EXT: 2+ bilateral LE edema  SKIN: Intact    LABS:                        9.5    6.21  )-----------( 303      ( 21 Oct 2020 13:33 )             34.7     10-21    137  |  100  |  21<H>  ----------------------------<  135<H>  4.1   |  26  |  1.3    Ca    8.9      21 Oct 2020 13:55  Mg     2.0     10-21    TPro  7.2  /  Alb  4.0  /  TBili  0.5  /  DBili  x   /  AST  19  /  ALT  12  /  AlkPhos  112  10-21    PT/INR - ( 21 Oct 2020 13:33 )   PT: 16.40 sec;   INR: 1.43 ratio         PTT - ( 21 Oct 2020 13:33 )  PTT:38.1 sec  Troponin T, Serum: <0.01 ng/mL (10-21-20 @ 13:33)    CARDIAC MARKERS ( 21 Oct 2020 13:33 )  x     / <0.01 ng/mL / x     / x     / x        Troponin trend:    Serum Pro-Brain Natriuretic Peptide: 3294 pg/mL (10-21-20 @ 13:33)          RADIOLOGY:  -CXR:    < from: Xray Chest 1 View-PORTABLE IMMEDIATE (Xray Chest 1 View-PORTABLE IMMEDIATE .) (10.21.20 @ 14:53) >  Impression:    Pulmonary vessel congestion. Overall unchanged exam    < end of copied text >      -TTE:    < from: Transthoracic Echocardiogram (07.17.19 @ 09:04) >  Summary:   1. Left ventricular ejection fraction, by visual estimation, is 55 to   60%.   2. Spectral Doppler shows impaired relaxation pattern of left   ventricular myocardial filling (Grade I diastolic dysfunction).   3. Mild aortic regurgitation.   4. Sclerotic aortic valve with decreased opening.    < end of copied text >    -CCTA:  -STRESS TEST:  -CATHETERIZATION:    ECG: atrial fibrillation with RVR    TELEMETRY EVENTS:

## 2020-10-22 NOTE — PROGRESS NOTE ADULT - SUBJECTIVE AND OBJECTIVE BOX
HPI    80 y/o female Turkish speaking with hx  CHpEF,DLD, AFib on Eliquis, sever bilateral knee MAUREEN, Sever MAUREEN not on CPAP, morbid obesity,  Hypothyroid presents to the ED for Shortness of breath for the last 2 months.  Patient is having worsening shortness of breath on exertion, lower ext swelling, Mild PND, she sleeps on 3 pillows. Denied any chest pain, palpations, N/V/D, GI or urinary Symptoms. Patient is compliant with medications and diet. she lives at home with daughter and can walk around home.   Patient follows with Dr. Berger, she was [facility] by pulmonary before for MAUREEN but she is not on CPAP.     In the ED Pt was HD stable,,  and irregular, 10 IV Cardizem and 60 PO with improvement of HR, 250 CC IVF Pro- bnp 3300, CXR showed pulmonary congestion.    Currently admitted to medicine with the primary diagnosis of Atrial fibrillation with RVR. Today is hospital day 1.     INTERVAL HPI / OVERNIGHT EVENTS:  Patient was examined and seen at bedside. This morning she is resting comfortably in bed, but is complaining of shortness of breath on room air.     Patient most likely has HF exacerbation due to A.fib with RVR. Metoprolol switched to Cardizem 30q6, and furosemide increased to 60 q12. HR has been better controlled (90's- low 100's). Plan is to follow up TSH, ECHO, and reassess dyspnea with controlled HR and increased diuretics.      ROS: Otherwise unremarkable     PAST MEDICAL & SURGICAL HISTORY  Abnormal cholesterol test  Acquired cataract  Acute CHF  Acquired hypothyroidism  H/O: HTN (hypertension)  Obesity, mild  History of chronic CHF    ALLERGIES  No Known Allergies    MEDICATIONS  STANDING MEDICATIONS  apixaban 5 milliGRAM(s) Oral every 12 hours  chlorhexidine 4% Liquid 1 Application(s) Topical <User Schedule>  diltiazem    Tablet 30 milliGRAM(s) Oral every 6 hours  furosemide   Injectable 60 milliGRAM(s) IV Push every 12 hours  levothyroxine 75 MICROGram(s) Oral daily  lisinopril 5 milliGRAM(s) Oral daily  metoprolol tartrate 50 milliGRAM(s) Oral two times a day    PRN MEDICATIONS    VITALS:  T(F): 97.7  HR: 124  BP: 124/67  RR: 19  SpO2: 98%    PHYSICAL EXAM  GEN: NAD, Resting comfortably in bed  PULM: Clear to auscultation bilaterally, No wheezes  CVS: Regular rate and rhythm, S1-S2, no murmurs  ABD: Soft, non-tender, non-distended, no guarding  EXT: No edema  NEURO: A&Ox3, no focal deficits    LABS                        9.6    7.79  )-----------( 283      ( 22 Oct 2020 06:32 )             34.5     10-22    143  |  101  |  19  ----------------------------<  104<H>  4.0   |  29  |  1.2    Ca    8.9      22 Oct 2020 06:32  Mg     2.0     10-22    TPro  6.8  /  Alb  3.8  /  TBili  0.5  /  DBili  x   /  AST  18  /  ALT  12  /  AlkPhos  111  10-22    PT/INR - ( 21 Oct 2020 13:33 )   PT: 16.40 sec;   INR: 1.43 ratio    PTT - ( 21 Oct 2020 13:33 )  PTT:38.1 sec    Troponin T, Serum: <0.01 ng/mL (10-21-20 @ 13:33)    CARDIAC MARKERS ( 21 Oct 2020 13:33 )  x     / <0.01 ng/mL / x     / x     / x

## 2020-10-22 NOTE — PROGRESS NOTE ADULT - ASSESSMENT
ASSESSMENT  80 y/o female Icelandic speaking with hx CAD, CHpEF,, AFib on Eliquis,  sever bilateral knee MAUREEN, Sever MAUREEN not on CPAP, morbid obesity,   Hypothyroid presents to the ED for Shortness of breath for the last 2 months pending ECHO, TSH, and medication adjustment for rate control     PLAN    # Acute on chronic Diastolic HF   - Echo from 2019: grade diastolic 1 dysfunction from 2019, normal EF  - f/u repeat ECHO  - c/w Lasix 60 mg IV BID  - Strict input / output     # Afib with RVR  - PE, CXR and ProBNP consistent with exacerbation of CHFpEF   - S/p Cardizem 10 IV and 60 PO in the ED   - C/w Eliquis   - Metoprolol switched to Cardizem 30 q6 d/t poorly controlled HR  - HR better controlled on Cardizem 30q6; inc. to 60q6 if consistently > 100  - f/u Cardiology consult, Dr. Nupur Berger     #CAD   - c/w metoprolol, lisinopril     #Hypothyroid  - c/w synthroid   - f/u TSH    # Severe MAUREEN   - not on CPAP    # hx Severe bilateral knee osteoarthritis   - f/u outpatient Rheumatology    # CKD stage 3, stable  -  baseline creatinine 1.3    # Microcytic anemia:  - f/u iron studies    # HTN   - c/w lisinopril     # Morbid obesity  - need counseling on weight loss    Activity as tolerated   Diet DASH/ TLC, fluid restriction 1L   DVT PPX Eliquis   GI ppx not indicated   Dispo from home   CODE status: FULL

## 2020-10-22 NOTE — CONSULT NOTE ADULT - ASSESSMENT
79 y.o female patient with PMH of HpEF, DLD, AFib on Eliquis, OA, MAUREEN not on CPAP, morbid obesity, hypothyroidism presents to the ED for shortness of breath for the last month.    # HFpEF exacerbation  - Most probably secondary to rapid atrial fibrillation  - Still in atrial fibrillation with RVR  - Continue IV diuretics; would increase Lasix to to 60mg IV q12h for now; monitor renal function, electrolytes, daily weight and volume status, strict I&Os; keep I<Os and adjust diuretics accordingly  - In light of HFpEF, goal is to control triggers of exacerbation; therefore, HR and BP should be controlled and patient should reach and remain in euvolemic state; also, patient should be advised to be compliant with CPAP  - Check 2D echo      # Atrial fibrillation  - CHADSVASC 5  - Continue Eliquis for anticoagulation; watch for signs and symptoms of bleeding  - 79 y.o female patient with PMH of HpEF, DLD, AFib on Eliquis, OA, MAUREEN not on CPAP, morbid obesity, hypothyroidism presents to the ED for shortness of breath for the last month.    # HFpEF exacerbation  - Most probably secondary to rapid atrial fibrillation  - Still in atrial fibrillation with RVR  - In light of HFpEF, goal is to control triggers of exacerbation; therefore, HR and BP should be controlled and patient should reach and remain in euvolemic state; also, patient should be advised to be compliant with CPAP  - Admit to telemetry  - Continue IV diuretics; would increase Lasix to to 60mg IV q12h for now; monitor renal function, electrolytes, daily weight and volume status, strict I&Os; keep I<Os and adjust diuretics accordingly  - Low salt diet  - Check 2D echo  - Can start Cardizem drip for better HR control  - Continue beta-blockers for now  - Continue Eliquis for stroke prevention secondary to atrial fibrillation (CHADSVASC 5); watch for signs and symptoms of bleeding  - Serial cardiac enzymes and EKG to R/O ischemia (although ACS not likely at this time)  - Monitor BP   79 y.o female patient with PMH of HpEF, DLD, AFib on Eliquis, OA, MAUREEN not on CPAP, morbid obesity, hypothyroidism presents to the ED for shortness of breath for the last month.    # HFpEF exacerbation  - Most probably secondary to rapid atrial fibrillation  - Still in atrial fibrillation with RVR  - In light of HFpEF, goal is to control triggers of exacerbation; therefore, HR and BP should be controlled and patient should reach and remain in euvolemic state; also, patient should be advised to be compliant with CPAP  - Admit to telemetry  - Continue IV diuretics; would increase Lasix to to 60mg IV q12h for now; monitor renal function, electrolytes, daily weight and volume status, strict I&Os; keep I<Os and adjust diuretics accordingly  - Low salt diet  - Check 2D echo  - Can start Cardizem drip for better HR control  - Continue beta-blockers for now  - Continue Eliquis for stroke prevention secondary to atrial fibrillation (CHADSVASC 5); watch for signs and symptoms of bleeding  - Serial cardiac enzymes and EKG to R/O ischemia (although ACS not likely at this time)  - Recommend weight loss  - Monitor BP   79 y.o female patient with PMH of HpEF, DLD, AFib on Eliquis, OA, MAUREEN not on CPAP, morbid obesity, hypothyroidism presents to the ED for shortness of breath for the last month.    # HFpEF exacerbation  - Most probably secondary to rapid atrial fibrillation  - Still in atrial fibrillation with RVR  - In light of HFpEF, goal is to control triggers of exacerbation; therefore, HR and BP should be controlled and patient should reach and remain in euvolemic state; also, patient should be advised to be compliant with CPAP  - Admit to telemetry  - Continue IV diuretics; would increase Lasix to to 60mg IV q12h for now; monitor renal function, electrolytes, daily weight and volume status, strict I&Os; keep I<Os and adjust diuretics accordingly  - Low salt diet  - Check 2D echo  - Can start Cardizem drip for better HR control, change to PO once HR better controlled  - Continue beta-blockers for now  - Continue Eliquis for stroke prevention secondary to atrial fibrillation (CHADSVASC 5); watch for signs and symptoms of bleeding  - Serial cardiac enzymes and EKG to R/O ischemia (although ACS not likely at this time)  - Recommend weight loss  - Monitor BP

## 2020-10-23 ENCOUNTER — TRANSCRIPTION ENCOUNTER (OUTPATIENT)
Age: 80
End: 2020-10-23

## 2020-10-23 LAB
ANION GAP SERPL CALC-SCNC: 13 MMOL/L — SIGNIFICANT CHANGE UP (ref 7–14)
BASOPHILS # BLD AUTO: 0.03 K/UL — SIGNIFICANT CHANGE UP (ref 0–0.2)
BASOPHILS NFR BLD AUTO: 0.5 % — SIGNIFICANT CHANGE UP (ref 0–1)
BUN SERPL-MCNC: 17 MG/DL — SIGNIFICANT CHANGE UP (ref 10–20)
CALCIUM SERPL-MCNC: 8.6 MG/DL — SIGNIFICANT CHANGE UP (ref 8.5–10.1)
CHLORIDE SERPL-SCNC: 101 MMOL/L — SIGNIFICANT CHANGE UP (ref 98–110)
CO2 SERPL-SCNC: 29 MMOL/L — SIGNIFICANT CHANGE UP (ref 17–32)
CREAT SERPL-MCNC: 1.1 MG/DL — SIGNIFICANT CHANGE UP (ref 0.7–1.5)
EOSINOPHIL # BLD AUTO: 0.23 K/UL — SIGNIFICANT CHANGE UP (ref 0–0.7)
EOSINOPHIL NFR BLD AUTO: 3.7 % — SIGNIFICANT CHANGE UP (ref 0–8)
GLUCOSE SERPL-MCNC: 106 MG/DL — HIGH (ref 70–99)
HCT VFR BLD CALC: 33.1 % — LOW (ref 37–47)
HGB BLD-MCNC: 9.2 G/DL — LOW (ref 12–16)
IMM GRANULOCYTES NFR BLD AUTO: 0.3 % — SIGNIFICANT CHANGE UP (ref 0.1–0.3)
LYMPHOCYTES # BLD AUTO: 1.67 K/UL — SIGNIFICANT CHANGE UP (ref 1.2–3.4)
LYMPHOCYTES # BLD AUTO: 26.6 % — SIGNIFICANT CHANGE UP (ref 20.5–51.1)
MAGNESIUM SERPL-MCNC: 2.1 MG/DL — SIGNIFICANT CHANGE UP (ref 1.8–2.4)
MCHC RBC-ENTMCNC: 20.5 PG — LOW (ref 27–31)
MCHC RBC-ENTMCNC: 27.8 G/DL — LOW (ref 32–37)
MCV RBC AUTO: 73.7 FL — LOW (ref 81–99)
MONOCYTES # BLD AUTO: 0.66 K/UL — HIGH (ref 0.1–0.6)
MONOCYTES NFR BLD AUTO: 10.5 % — HIGH (ref 1.7–9.3)
NEUTROPHILS # BLD AUTO: 3.68 K/UL — SIGNIFICANT CHANGE UP (ref 1.4–6.5)
NEUTROPHILS NFR BLD AUTO: 58.4 % — SIGNIFICANT CHANGE UP (ref 42.2–75.2)
NRBC # BLD: 0 /100 WBCS — SIGNIFICANT CHANGE UP (ref 0–0)
PHOSPHATE SERPL-MCNC: 3.4 MG/DL — SIGNIFICANT CHANGE UP (ref 2.1–4.9)
PLATELET # BLD AUTO: 275 K/UL — SIGNIFICANT CHANGE UP (ref 130–400)
POTASSIUM SERPL-MCNC: 4 MMOL/L — SIGNIFICANT CHANGE UP (ref 3.5–5)
POTASSIUM SERPL-SCNC: 4 MMOL/L — SIGNIFICANT CHANGE UP (ref 3.5–5)
RBC # BLD: 4.49 M/UL — SIGNIFICANT CHANGE UP (ref 4.2–5.4)
RBC # FLD: 19.7 % — HIGH (ref 11.5–14.5)
SODIUM SERPL-SCNC: 143 MMOL/L — SIGNIFICANT CHANGE UP (ref 135–146)
TROPONIN T SERPL-MCNC: <0.01 NG/ML — SIGNIFICANT CHANGE UP
WBC # BLD: 6.29 K/UL — SIGNIFICANT CHANGE UP (ref 4.8–10.8)
WBC # FLD AUTO: 6.29 K/UL — SIGNIFICANT CHANGE UP (ref 4.8–10.8)

## 2020-10-23 PROCEDURE — 93925 LOWER EXTREMITY STUDY: CPT | Mod: 26

## 2020-10-23 PROCEDURE — 99233 SBSQ HOSP IP/OBS HIGH 50: CPT

## 2020-10-23 RX ORDER — COLLAGENASE CLOSTRIDIUM HIST. 250 UNIT/G
1 OINTMENT (GRAM) TOPICAL DAILY
Refills: 0 | Status: DISCONTINUED | OUTPATIENT
Start: 2020-10-23 | End: 2020-10-30

## 2020-10-23 RX ORDER — FUROSEMIDE 40 MG
40 TABLET ORAL
Refills: 0 | Status: DISCONTINUED | OUTPATIENT
Start: 2020-10-23 | End: 2020-10-24

## 2020-10-23 RX ORDER — IRON SUCROSE 20 MG/ML
100 INJECTION, SOLUTION INTRAVENOUS EVERY 24 HOURS
Refills: 0 | Status: DISCONTINUED | OUTPATIENT
Start: 2020-10-23 | End: 2020-10-26

## 2020-10-23 RX ADMIN — Medication 40 MILLIGRAM(S): at 18:46

## 2020-10-23 RX ADMIN — CHLORHEXIDINE GLUCONATE 1 APPLICATION(S): 213 SOLUTION TOPICAL at 06:17

## 2020-10-23 RX ADMIN — Medication 60 MILLIGRAM(S): at 06:17

## 2020-10-23 RX ADMIN — Medication 50 MILLIGRAM(S): at 18:46

## 2020-10-23 RX ADMIN — LISINOPRIL 5 MILLIGRAM(S): 2.5 TABLET ORAL at 06:17

## 2020-10-23 RX ADMIN — Medication 30 MILLIGRAM(S): at 06:17

## 2020-10-23 RX ADMIN — Medication 30 MILLIGRAM(S): at 18:45

## 2020-10-23 RX ADMIN — IRON SUCROSE 210 MILLIGRAM(S): 20 INJECTION, SOLUTION INTRAVENOUS at 13:25

## 2020-10-23 RX ADMIN — APIXABAN 5 MILLIGRAM(S): 2.5 TABLET, FILM COATED ORAL at 06:17

## 2020-10-23 RX ADMIN — Medication 30 MILLIGRAM(S): at 11:39

## 2020-10-23 RX ADMIN — Medication 75 MICROGRAM(S): at 06:17

## 2020-10-23 RX ADMIN — APIXABAN 5 MILLIGRAM(S): 2.5 TABLET, FILM COATED ORAL at 17:33

## 2020-10-23 RX ADMIN — Medication 30 MILLIGRAM(S): at 23:17

## 2020-10-23 RX ADMIN — Medication 50 MILLIGRAM(S): at 06:17

## 2020-10-23 NOTE — CONSULT NOTE ADULT - SUBJECTIVE AND OBJECTIVE BOX
Podiatry Consult Note    Subjective:  DEL LEBLANC is a pleasant well-nourished, well developed 79y Female in no acute distress, alert awake, and oriented to person, place and time.   Patient is a 79y old  Female who presents with a chief complaint of Stasis Ulcer on Lateral Ankle Extending to Dorsal Right Foot;  (22 Oct 2020 11:37)    HPI:   78 y/o female Kinyarwanda speaking with hx  CHpEF,DLD, AFib on Eliquis, sever bilateral knee MAUREEN, Sever MAUREEN not on CPAP, morbid obesity,  Hypothyroid presents to the ED for Shortness of breath for the last 2 months.  Patient is having worsening shortness of breath on exertion, lower ext swelling, Mild PND, she sleeps on 3 pillows. Denied any chest pain, palpations, N/V/D, GI or urinary Symptoms. Patient is compliant with medications and diet. she lives at home with daughter and can walk around home.   Patient follows with Dr. Berger, she was [facility] by pulmonary before for MAUREEN but she is not on CPAP.     In the ED Pt was Hd stable,,  and irregular, 10 IV Cardizem and 60 PO with improvement of HR, 250 CC IVF Pro- bnp 3300, CXR showed pulmonary congestion.  ,  (21 Oct 2020 19:38)    Past Medical History and Surgical History  Abnormal cholesterol test  Acquired cataract  Acute CHF  Acquired hypothyroidism  H/O: HTN (hypertension)  Obesity, mild  History of chronic CHF    Objective:  Vital Signs Last 24 Hrs  T(C): 35.9 (23 Oct 2020 05:59), Max: 36.8 (22 Oct 2020 12:43)  T(F): 96.7 (23 Oct 2020 05:59), Max: 98.3 (22 Oct 2020 12:43)  HR: 97 (23 Oct 2020 05:59) (62 - 115)  BP: 115/61 (23 Oct 2020 05:59) (93/53 - 116/72)  BP(mean): --  RR: 20 (23 Oct 2020 05:59) (18 - 20)  SpO2: 98% (22 Oct 2020 20:21) (98% - 98%)                        9.2    6.29  )-----------( 275      ( 23 Oct 2020 04:30 )             33.1                 10-23    143  |  101  |  17  ----------------------------<  106<H>  4.0   |  29  |  1.1    Ca    8.6      23 Oct 2020 04:30  Phos  3.4     10-23  Mg     2.1     10-23    TPro  6.8  /  Alb  3.8  /  TBili  0.5  /  DBili  x   /  AST  18  /  ALT  12  /  AlkPhos  111  10-22    Physical Exam - Lower Extremity Focused:   Derm:   Open Stasis Ulcer on Lateral Ankle Extending to Dorsal Foot;   Red Granular Wound Base; w/ Mild Drainage; No Active Bleeding Noted;  Clean Wound Margins;    Vascular: DP Pulses Palpable; Right Foot;   Neuro: Protective Sensation Mildly Diminished;     Assessment:  Open Stasis Ulcer on Lateral Ankle Extending to Dorsal Foot;   Stable; No Signs of Active Infection;     Plan:  Chart reviewed and Patient evaluated. All Questions and Concerns Addressed and Answered  Discussed diagnosis and treatment with patient  Local Wound Care; Wash w/ Soap and Normal Saline; Santyl / Xeroform / DSD / Kerlix / Ace Wrap; RLE;   Ulcer is Chronic and Stable; Continue w/ Local Wound Care While Patient Admitted; Q24 Dressing Changes;  Stable Per Podiatry Standpint; Follow Up w/ Dr. Hyatt as Outpatient;  Discussed Plan w/ Dr. Hyatt;     Podiatry

## 2020-10-23 NOTE — PHYSICAL THERAPY INITIAL EVALUATION ADULT - PERTINENT HX OF CURRENT PROBLEM, REHAB EVAL
80 y/o female Greenlandic speaking with hx  CHpEF,DLD, AFib on Eliquis, sever bilateral knee MAUREEN, Sever MAUREEN not on CPAP, morbid obesity,  Hypothyroid presents to the ED for Shortness of breath for the last 2 months.

## 2020-10-23 NOTE — PROGRESS NOTE ADULT - ASSESSMENT
ASSESSMENT  78 y/o female British speaking with hx CAD, CHpEF,, AFib on Eliquis,  sever bilateral knee MAUREEN, Sever MAUREEN not on CPAP, morbid obesity,   Hypothyroid presents to the ED for Shortness of breath for the last 2 months with ECHO showing EF 40-45%, HR better controlled on Cardizem 30q6, anticipating for discharge tomorrow pending arterial duplex to evaluate for vascular disease given R foot/lateral leg ulcer.     PLAN    # Acute on chronic Diastolic HF   - Echo from 2019: grade diastolic 1 dysfunction from 2019, normal EF  - ECHO 10/22: EF 40-45%  - started on Lasix 40 mg BID  - Strict input / output   - 1200 ml Fluid restriction  - patient is currently asymptomatic      # Afib with RVR  - PE, CXR and ProBNP consistent with exacerbation of CHFpEF   - S/p Cardizem 10 IV and 60 PO in the ED   - cw Eliquis   - c/w  Cardizem 30 q6   - HR better controlled on Cardizem 30q6; inc. to 60q6 if consistently > 100  - f/u Cardiology reccs given ECHO     # CAD   - c/w metoprolol, lisinopril     # Hypothyroid  - c/w synthroid   - f/u TSH    # Iron deficiency  - started on 10 0mg daily iron sucrose    # Severe MAUREEN   - not on CPAP    # hx Severe bilateral knee osteoarthritis   - f/u outpatient Rheumatology    # CKD stage 3, stable  -  baseline creatinine 1.3    # Microcytic anemia:  - f/u iron studies    # HTN   - c/w lisinopril     # Morbid obesity  - need counseling on weight loss    Activity as tolerated   Diet DASH/ TLC, fluid restriction 1L   DVT PPX Eliquis   GI ppx not indicated   Dispo from home   CODE status: FULL

## 2020-10-23 NOTE — PROGRESS NOTE ADULT - SUBJECTIVE AND OBJECTIVE BOX
HPI  80 y/o female Peruvian speaking with hx  CHpEF, DLD, AFib on Eliquis, sever bilateral knee MAUREEN, Sever MAUREEN not on CPAP, morbid obesity,  Hypothyroid presents to the ED for Shortness of breath for the last 2 months.    Patient is having worsening shortness of breath on exertion, lower ext swelling, Mild PND, she sleeps on 3 pillows. Denied any chest pain, palpations, N/V/D, GI or urinary Symptoms. Patient is compliant with medications and diet. she lives at home with daughter and can walk around home.   Patient follows with Dr. Berger, she was eval by pulmonary before for MAUREEN but she is not on CPAP.     In the ED Pt was HD stable,  and irregular, 10 IV Cardizem and 60 PO with improvement of HR, 250 CC IVF Pro-BNP 3300, CXR showed pulmonary congestion.    Currently admitted to medicine with the primary diagnosis of Atrial fibrillation with RVR. Today is hospital day 1.     INTERVAL HPI / OVERNIGHT EVENTS:  Patient was examined and seen at bedside. This morning she is resting comfortably in bed, and is not complaining of any shortness of breath on room air. She did report dizziness, pending orthostatic evaluation     Patient most likely has HF exacerbation due to A.fib with RVR. Metoprolol switched to Cardizem 30q6, and furosemide increased to 60 q12. HR has been better controlled (90's- low 100's).     ECHO 40-45% EF. Patient is currently on Lasix 40 mg BID, and plan is for discharge on 40 mg once daily.    ROS: Otherwise unremarkable     PAST MEDICAL & SURGICAL HISTORY  Abnormal cholesterol test  Acquired cataract  Acute CHF  Acquired hypothyroidism  H/O: HTN (hypertension)  Obesity, mild  History of chronic CHF    ALLERGIES  No Known Allergies    MEDICATIONS  STANDING MEDICATIONS  apixaban 5 milliGRAM(s) Oral every 12 hours  chlorhexidine 4% Liquid 1 Application(s) Topical <User Schedule>  collagenase Ointment 1 Application(s) Topical daily  diltiazem    Tablet 30 milliGRAM(s) Oral every 6 hours  furosemide    Tablet 40 milliGRAM(s) Oral two times a day  iron sucrose IVPB 100 milliGRAM(s) IV Intermittent every 24 hours  levothyroxine 75 MICROGram(s) Oral daily  lisinopril 5 milliGRAM(s) Oral daily  metoprolol tartrate 50 milliGRAM(s) Oral two times a day    PRN MEDICATIONS    VITALS:  T(F): 96.7  HR: 97  BP: 115/61  RR: 20  SpO2: 98%    PHYSICAL EXAM  GEN: NAD, Resting comfortably in bed  PULM: Clear to auscultation bilaterally, No wheezes  CVS: Regular rate and rhythm, S1-S2, no murmurs  ABD: Soft, non-tender, non-distended, no guarding  EXT: No edema  NEURO: A&Ox3, no focal deficits    LABS                        9.2    6.29  )-----------( 275      ( 23 Oct 2020 04:30 )             33.1     10-23    143  |  101  |  17  ----------------------------<  106<H>  4.0   |  29  |  1.1    Ca    8.6      23 Oct 2020 04:30  Phos  3.4     10-23  Mg     2.1     10-23    TPro  6.8  /  Alb  3.8  /  TBili  0.5  /  DBili  x   /  AST  18  /  ALT  12  /  AlkPhos  111  10-22    PT/INR - ( 21 Oct 2020 13:33 )   PT: 16.40 sec;   INR: 1.43 ratio    PTT - ( 21 Oct 2020 13:33 )  PTT:38.1 sec    Troponin T, Serum: <0.01 ng/mL (10-23-20 @ 04:30)      CARDIAC MARKERS ( 23 Oct 2020 04:30 )  x     / <0.01 ng/mL / x     / x     / x      CARDIAC MARKERS ( 22 Oct 2020 11:20 )  x     / <0.01 ng/mL / x     / x     / x      CARDIAC MARKERS ( 21 Oct 2020 13:33 )  x     / <0.01 ng/mL / x     / x     / x          RADIOLOGY

## 2020-10-23 NOTE — PHYSICAL THERAPY INITIAL EVALUATION ADULT - FOLLOWS COMMANDS/ANSWERS QUESTIONS, REHAB EVAL
Pt speaks Malawian,  ID#[id number] was used during PT session/able to follow single-step instructions/100% of the time

## 2020-10-23 NOTE — PHYSICAL THERAPY INITIAL EVALUATION ADULT - PHYSICAL ASSIST/NONPHYSICAL ASSIST: SUPINE/SIT, REHAB EVAL
Pt c/o dizziness upon sitting. BP in sittin/78, HR:104. PT reported that she felt better after seated break/1 person assist

## 2020-10-23 NOTE — DISCHARGE NOTE NURSING/CASE MANAGEMENT/SOCIAL WORK - PATIENT PORTAL LINK FT
You can access the FollowMyHealth Patient Portal offered by [facility] by registering at the following website: [email or web address]. By joining [email or web address]’s FollowMyHealth portal, you will also be able to view your health information using other applications (apps) compatible with our system.

## 2020-10-23 NOTE — PHYSICAL THERAPY INITIAL EVALUATION ADULT - IMPAIRMENTS CONTRIBUTING TO GAIT DEVIATIONS, PT EVAL
BP in sitting after ambulation:103/60, HR:85/impaired balance Follow up with PCP as scheduled Given urgent  referral; can also continue with PCP SW made  referral- no appointments available (see  note) patient being referred to [facility] walk in- crisis clinic

## 2020-10-23 NOTE — PHYSICAL THERAPY INITIAL EVALUATION ADULT - GENERAL OBSERVATIONS, REHAB EVAL
Pt was seen from 8:50-9:25 for PT IE. PT was rec'd in semi recline in bed, NAD, +tele, +prima fit, agreeable to participate in PT.

## 2020-10-23 NOTE — PHYSICAL THERAPY INITIAL EVALUATION ADULT - ADDITIONAL COMMENTS
Pt reported that she uses SC, was household independent with SC for ambulation, and her daughter helped her with ambulation. Pt also reported that she fell so many times at home.

## 2020-10-24 ENCOUNTER — TRANSCRIPTION ENCOUNTER (OUTPATIENT)
Age: 80
End: 2020-10-24

## 2020-10-24 LAB
ANION GAP SERPL CALC-SCNC: 10 MMOL/L — SIGNIFICANT CHANGE UP (ref 7–14)
BASOPHILS # BLD AUTO: 0.01 K/UL — SIGNIFICANT CHANGE UP (ref 0–0.2)
BASOPHILS NFR BLD AUTO: 0.1 % — SIGNIFICANT CHANGE UP (ref 0–1)
BUN SERPL-MCNC: 21 MG/DL — HIGH (ref 10–20)
CALCIUM SERPL-MCNC: 8.7 MG/DL — SIGNIFICANT CHANGE UP (ref 8.5–10.1)
CHLORIDE SERPL-SCNC: 101 MMOL/L — SIGNIFICANT CHANGE UP (ref 98–110)
CO2 SERPL-SCNC: 29 MMOL/L — SIGNIFICANT CHANGE UP (ref 17–32)
CREAT SERPL-MCNC: 1.2 MG/DL — SIGNIFICANT CHANGE UP (ref 0.7–1.5)
EOSINOPHIL # BLD AUTO: 0.23 K/UL — SIGNIFICANT CHANGE UP (ref 0–0.7)
EOSINOPHIL NFR BLD AUTO: 3.1 % — SIGNIFICANT CHANGE UP (ref 0–8)
GLUCOSE SERPL-MCNC: 109 MG/DL — HIGH (ref 70–99)
HCT VFR BLD CALC: 33.5 % — LOW (ref 37–47)
HGB BLD-MCNC: 9.4 G/DL — LOW (ref 12–16)
IMM GRANULOCYTES NFR BLD AUTO: 0.3 % — SIGNIFICANT CHANGE UP (ref 0.1–0.3)
LYMPHOCYTES # BLD AUTO: 1.75 K/UL — SIGNIFICANT CHANGE UP (ref 1.2–3.4)
LYMPHOCYTES # BLD AUTO: 23.4 % — SIGNIFICANT CHANGE UP (ref 20.5–51.1)
MAGNESIUM SERPL-MCNC: 2.1 MG/DL — SIGNIFICANT CHANGE UP (ref 1.8–2.4)
MCHC RBC-ENTMCNC: 20.8 PG — LOW (ref 27–31)
MCHC RBC-ENTMCNC: 28.1 G/DL — LOW (ref 32–37)
MCV RBC AUTO: 74 FL — LOW (ref 81–99)
MONOCYTES # BLD AUTO: 0.73 K/UL — HIGH (ref 0.1–0.6)
MONOCYTES NFR BLD AUTO: 9.7 % — HIGH (ref 1.7–9.3)
NEUTROPHILS # BLD AUTO: 4.75 K/UL — SIGNIFICANT CHANGE UP (ref 1.4–6.5)
NEUTROPHILS NFR BLD AUTO: 63.4 % — SIGNIFICANT CHANGE UP (ref 42.2–75.2)
NRBC # BLD: 0 /100 WBCS — SIGNIFICANT CHANGE UP (ref 0–0)
PHOSPHATE SERPL-MCNC: 3.8 MG/DL — SIGNIFICANT CHANGE UP (ref 2.1–4.9)
PLATELET # BLD AUTO: 282 K/UL — SIGNIFICANT CHANGE UP (ref 130–400)
POTASSIUM SERPL-MCNC: 3.7 MMOL/L — SIGNIFICANT CHANGE UP (ref 3.5–5)
POTASSIUM SERPL-SCNC: 3.7 MMOL/L — SIGNIFICANT CHANGE UP (ref 3.5–5)
RBC # BLD: 4.53 M/UL — SIGNIFICANT CHANGE UP (ref 4.2–5.4)
RBC # FLD: 19.6 % — HIGH (ref 11.5–14.5)
SODIUM SERPL-SCNC: 140 MMOL/L — SIGNIFICANT CHANGE UP (ref 135–146)
WBC # BLD: 7.49 K/UL — SIGNIFICANT CHANGE UP (ref 4.8–10.8)
WBC # FLD AUTO: 7.49 K/UL — SIGNIFICANT CHANGE UP (ref 4.8–10.8)

## 2020-10-24 PROCEDURE — 99233 SBSQ HOSP IP/OBS HIGH 50: CPT

## 2020-10-24 RX ORDER — FUROSEMIDE 40 MG
40 TABLET ORAL EVERY 12 HOURS
Refills: 0 | Status: DISCONTINUED | OUTPATIENT
Start: 2020-10-24 | End: 2020-10-26

## 2020-10-24 RX ORDER — COLLAGENASE CLOSTRIDIUM HIST. 250 UNIT/G
1 OINTMENT (GRAM) TOPICAL
Qty: 1 | Refills: 0
Start: 2020-10-24 | End: 2020-11-22

## 2020-10-24 RX ORDER — DILTIAZEM HCL 120 MG
1 CAPSULE, EXT RELEASE 24 HR ORAL
Qty: 30 | Refills: 0
Start: 2020-10-24 | End: 2020-11-22

## 2020-10-24 RX ORDER — DILTIAZEM HCL 120 MG
30 CAPSULE, EXT RELEASE 24 HR ORAL
Qty: 900 | Refills: 0
Start: 2020-10-24 | End: 2020-11-22

## 2020-10-24 RX ORDER — METOPROLOL TARTRATE 50 MG
1 TABLET ORAL
Qty: 0 | Refills: 0 | DISCHARGE

## 2020-10-24 RX ORDER — METOPROLOL TARTRATE 50 MG
1 TABLET ORAL
Qty: 60 | Refills: 0
Start: 2020-10-24 | End: 2020-11-22

## 2020-10-24 RX ADMIN — Medication 40 MILLIGRAM(S): at 05:54

## 2020-10-24 RX ADMIN — APIXABAN 5 MILLIGRAM(S): 2.5 TABLET, FILM COATED ORAL at 05:53

## 2020-10-24 RX ADMIN — Medication 30 MILLIGRAM(S): at 23:26

## 2020-10-24 RX ADMIN — IRON SUCROSE 210 MILLIGRAM(S): 20 INJECTION, SOLUTION INTRAVENOUS at 17:10

## 2020-10-24 RX ADMIN — Medication 50 MILLIGRAM(S): at 17:09

## 2020-10-24 RX ADMIN — APIXABAN 5 MILLIGRAM(S): 2.5 TABLET, FILM COATED ORAL at 17:10

## 2020-10-24 RX ADMIN — Medication 30 MILLIGRAM(S): at 11:37

## 2020-10-24 RX ADMIN — Medication 30 MILLIGRAM(S): at 05:53

## 2020-10-24 RX ADMIN — Medication 40 MILLIGRAM(S): at 17:21

## 2020-10-24 RX ADMIN — Medication 75 MICROGRAM(S): at 05:53

## 2020-10-24 RX ADMIN — Medication 50 MILLIGRAM(S): at 05:53

## 2020-10-24 RX ADMIN — Medication 30 MILLIGRAM(S): at 17:09

## 2020-10-24 RX ADMIN — CHLORHEXIDINE GLUCONATE 1 APPLICATION(S): 213 SOLUTION TOPICAL at 05:55

## 2020-10-24 RX ADMIN — LISINOPRIL 5 MILLIGRAM(S): 2.5 TABLET ORAL at 05:53

## 2020-10-24 NOTE — DISCHARGE NOTE PROVIDER - PROVIDER TOKENS
PROVIDER:[TOKEN:[[id number]],FOLLOWUP:[1 week]],PROVIDER:[TOKEN:[[id number]],FOLLOWUP:[2 weeks]] PROVIDER:[TOKEN:[[id number]],FOLLOWUP:[1 week]],PROVIDER:[TOKEN:[[id number]],FOLLOWUP:[2 weeks]],PROVIDER:[TOKEN:[[id number]],FOLLOWUP:[2 weeks],ESTABLISHEDPATIENT:[T]]

## 2020-10-24 NOTE — DISCHARGE NOTE PROVIDER - CARE PROVIDER_API CALL
Ashlyn Burris (DO)  Geriatric Medicine; Internal Medicine  [address] [place] [address]  Phone: [phone]  Fax: [phone]  Follow Up Time: 1 week    Cyrus Hyatt (MATTEO)  [facility]  [address] [facility], [address]st [address]  [place] [address]  Phone: [phone]  Fax: [phone]  Follow Up Time: 2 weeks   Ashlyn Burris (DO)  Geriatric Medicine; Internal Medicine  [address] [place] [address]  Phone: [phone]  Fax: [phone]  Follow Up Time: 1 week    Cyrsu Hyatt)  [facility]  [address] [facility]st [address]  [place] [address]  Phone: [phone]  Fax: [phone]  Follow Up Time: 2 weeks    Annetta Berger)  Cardiovascular Disease; Internal Medicine  [address] [place] [address], [facility] [address]  [place] [address]  Phone: [phone]  Fax: [phone]  Established Patient  Follow Up Time: 2 weeks

## 2020-10-24 NOTE — DISCHARGE NOTE PROVIDER - HOSPITAL COURSE
78 y/o female Bahraini speaking with hx CAD, CHpEF,, AFib on Eliquis,  sever bilateral knee MAUREEN, Sever MAUREEN not on CPAP, morbid obesity,   Hypothyroid presents to the ED for Shortness of breath for the last 2 months.  She was diagnosed with HF exacerbation and was started on iv lasix transitioned to po lasix when her condition improved.  Her HF exacerbation was triggered by tachyarrythmia, she had afib with RVR controlled with cardiazem and metoprolol..  ECHO showing EF 40-45%.  She had chronic ulcer of the leg, evaluated by podiatry  that recommended wound care. US duplex arterial showed normal blood flow.  patient Is stable and ready for discharge. She will follow up with her pcp, podiatry and cardiology in the clinics.

## 2020-10-24 NOTE — DISCHARGE NOTE PROVIDER - CARE PROVIDERS DIRECT ADDRESSES
[email or web address]_Unknown [email or web address],DirectAddress_Unknown,[email or web address]

## 2020-10-24 NOTE — DISCHARGE NOTE PROVIDER - NSDCCPCAREPLAN_GEN_ALL_CORE_FT
PRINCIPAL DISCHARGE DIAGNOSIS  Diagnosis: Atrial fibrillation with RVR  Assessment and Plan of Treatment: You presented with shortness of breath secondary to HF exacerbation with extra fluid in your body. Your heart failure exacerbation was caused by afib with rapid ventricular response. You received lasix iv with transition to po when your volume status improved. Your heart medications were adjusted to control your heart rate. take your medications as prescribed and follow up with your primary care doctor and your cardiologist in the clinics.      SECONDARY DISCHARGE DIAGNOSES  Diagnosis: Venous stasis ulcer without varicose veins  Assessment and Plan of Treatment: you have a chronic ulcer of the right ankle secondary to chronic swelling of the leg. You were evaluated by podiatry who recommended wound dressing and care. Ultrasound duplex was done to assess the vascular supply to the legs and was normal. Apply the dressing as instructed and follow up with podiatry in the clinics.

## 2020-10-24 NOTE — PROGRESS NOTE ADULT - ASSESSMENT
78 y/o Malay speaking female with PMH of CAD, CHpEF,, AFib on Eliquis,  sever bilateral knee MAUREEN, Sever MAUREEN not on CPAP, morbid obesity,   Hypothyroid presents to the ED for Shortness of breath for the last 2 months, she was found with acute CHF exacerbation, started on Lasix IV.     A/P:   Acute on chronic HFpEF:   patient presented with SOB, LE edema, Pro-BNP was 3294  ECHO 10/22: LVEF 40-45%, mild to mod MR< mild TR< mild MI.   CXR 10/21 showed bilateral pulmonary congestion and mild interstitial edema.   Repeat CXR in AM.   Continue on Lasix 40 mg BID, one dose Iv today.   Fluid [id number] ml daily.     Chronic atrial fibrillation with RVR:   Rate is better, Continue  Cardizem 60mg q 6 hrs and Metoprolol 50mg BID.   Continue Eliquis.    CAD: Troponin x3 negative.   Continue metoprolol and Lipitor.      Hypothyroid  - c/w synthroid     Iron deficiency  Continue Venofer IV daily.     Severe MAUREEN     CKD stage 3, stable  baseline creatinine 1.3      Morbid obesity  - need counseling on weight loss      DVT PPX Eliquis   GI ppx not indicated   Dispo from home   CODE status: FULL     Possible discharge in 24 hrs if symptoms improved.

## 2020-10-24 NOTE — PROGRESS NOTE ADULT - SUBJECTIVE AND OBJECTIVE BOX
DEL LEBLANC  79y  Female      Patient is a 79y old  Female who presents with a chief complaint of shortness of breath (24 Oct 2020 10:06)      INTERVAL HPI/OVERNIGHT EVENTS:  She is still with SOB with minimal activities. No chest pain, no fever   Vital Signs Last 24 Hrs  T(C): 36.3 (24 Oct 2020 13:55), Max: 36.3 (24 Oct 2020 13:55)  T(F): 97.3 (24 Oct 2020 13:55), Max: 97.3 (24 Oct 2020 13:55)  HR: 113 (24 Oct 2020 13:55) (80 - 120)  BP: 103/65 (24 Oct 2020 13:55) (85/60 - 138/62)  BP(mean): --  RR: 19 (24 Oct 2020 13:55) (18 - 19)  SpO2: 97% (23 Oct 2020 18:27) (97% - 97%)      10-23-20 @ 07:01  -  10-24-20 @ 07:00  --------------------------------------------------------  IN: 720 mL / OUT: 1000 mL / NET: -280 mL    10-24-20 @ 07:01  -  10-24-20 @ 15:30  --------------------------------------------------------  IN: 660 mL / OUT: 350 mL / NET: 310 mL            Consultant(s) Notes Reviewed:  [x ] YES  [ ] NO          MEDICATIONS  (STANDING):  apixaban 5 milliGRAM(s) Oral every 12 hours  chlorhexidine 4% Liquid 1 Application(s) Topical <User Schedule>  collagenase Ointment 1 Application(s) Topical daily  diltiazem    Tablet 30 milliGRAM(s) Oral every 6 hours  furosemide   Injectable 40 milliGRAM(s) IV Push every 12 hours  iron sucrose IVPB 100 milliGRAM(s) IV Intermittent every 24 hours  levothyroxine 75 MICROGram(s) Oral daily  lisinopril 5 milliGRAM(s) Oral daily  metoprolol tartrate 50 milliGRAM(s) Oral two times a day    MEDICATIONS  (PRN):      LABS                          9.4    7.49  )-----------( 282      ( 24 Oct 2020 05:27 )             33.5     10-24    140  |  101  |  21<H>  ----------------------------<  109<H>  3.7   |  29  |  1.2    Ca    8.7      24 Oct 2020 05:27  Phos  3.8     10-24  Mg     2.1     10-24            Lactate Trend    CARDIAC MARKERS ( 23 Oct 2020 04:30 )  x     / <0.01 ng/mL / x     / x     / x          CAPILLARY BLOOD GLUCOSE            RADIOLOGY & ADDITIONAL TESTS:    Imaging Personally Reviewed:  [ ] YES  [ ] NO    HEALTH ISSUES - PROBLEM Dx:          PHYSICAL EXAM:  GENERAL: NAD, well-developed.  HEAD:  Atraumatic, Normocephalic.  EYES: EOMI, PERRLA, conjunctiva and sclera clear.  NECK: Supple, No JVD.  CHEST/LUNG: bibasilar rales.   HEART: Irregular rate and rhythm; S1 S2.   ABDOMEN: Soft, Nontender, Nondistended; Bowel sounds present.  EXTREMITIES:  2+ Peripheral Pulses, LE edema 2+  PSYCH: AAOx3.  NEUROLOGY: non-focal.  SKIN: No rashes or lesions.

## 2020-10-24 NOTE — DISCHARGE NOTE PROVIDER - NSDCFUSCHEDAPPT_GEN_ALL_CORE_FT
DEL LEBLANC ; 11/02/2020 ; [facility] Pod[facility] [address] ; 11/05/2020 ; [facility] Ger[facility]s [address] ; 11/10/2020 ; [facility] Cardiology [address]

## 2020-10-24 NOTE — DISCHARGE NOTE PROVIDER - NSDCMRMEDTOKEN_GEN_ALL_CORE_FT
collagenase 250 units/g topical ointment: 1 application topically once a day  dilTIAZem 30 mg oral tablet: 30 tab(s) orally once a day   Eliquis 5 mg oral tablet: 1 tab(s) orally 2 times a day  Lasix 40 mg oral tablet: 1 tab(s) orally 2 times a day  lisinopril 5 mg oral tablet: 1 tab(s) orally once a day  metoprolol tartrate 50 mg oral tablet: 1 tab(s) orally 2 times a day  Synthroid 75 mcg (0.075 mg) oral tablet: 1 tab(s) orally once a day   collagenase 250 units/g topical ointment: 1 application topically once a day  dilTIAZem 120 mg/24 hours oral tablet, extended release: 1 tab(s) orally once a day   Eliquis 5 mg oral tablet: 1 tab(s) orally 2 times a day  Lasix 40 mg oral tablet: 1 tab(s) orally 2 times a day  lisinopril 5 mg oral tablet: 1 tab(s) orally once a day  metoprolol tartrate 50 mg oral tablet: 1 tab(s) orally 2 times a day  Synthroid 75 mcg (0.075 mg) oral tablet: 1 tab(s) orally once a day   amiodarone 200 mg oral tablet: 1 tab(s) orally 2 times a day until november 6th.   amiodarone 200 mg oral tablet: 1 tab(s) orally once a day. start taking this dose on november 7th/2020   Eliquis 5 mg oral tablet: 1 tab(s) orally 2 times a day  Lasix 40 mg oral tablet: 1 tab(s) orally 2 times a day  metoprolol succinate 100 mg oral tablet, extended release: 1 tab(s) orally 2 times a day  Synthroid 75 mcg (0.075 mg) oral tablet: 1 tab(s) orally once a day

## 2020-10-25 LAB
ALBUMIN SERPL ELPH-MCNC: 3.5 G/DL — SIGNIFICANT CHANGE UP (ref 3.5–5.2)
ALP SERPL-CCNC: 103 U/L — SIGNIFICANT CHANGE UP (ref 30–115)
ALT FLD-CCNC: 10 U/L — SIGNIFICANT CHANGE UP (ref 0–41)
ANION GAP SERPL CALC-SCNC: 11 MMOL/L — SIGNIFICANT CHANGE UP (ref 7–14)
AST SERPL-CCNC: 17 U/L — SIGNIFICANT CHANGE UP (ref 0–41)
BASOPHILS # BLD AUTO: 0.02 K/UL — SIGNIFICANT CHANGE UP (ref 0–0.2)
BASOPHILS NFR BLD AUTO: 0.3 % — SIGNIFICANT CHANGE UP (ref 0–1)
BILIRUB SERPL-MCNC: 0.4 MG/DL — SIGNIFICANT CHANGE UP (ref 0.2–1.2)
BUN SERPL-MCNC: 22 MG/DL — HIGH (ref 10–20)
CALCIUM SERPL-MCNC: 8.8 MG/DL — SIGNIFICANT CHANGE UP (ref 8.5–10.1)
CHLORIDE SERPL-SCNC: 100 MMOL/L — SIGNIFICANT CHANGE UP (ref 98–110)
CO2 SERPL-SCNC: 29 MMOL/L — SIGNIFICANT CHANGE UP (ref 17–32)
CREAT SERPL-MCNC: 1.2 MG/DL — SIGNIFICANT CHANGE UP (ref 0.7–1.5)
EOSINOPHIL # BLD AUTO: 0.25 K/UL — SIGNIFICANT CHANGE UP (ref 0–0.7)
EOSINOPHIL NFR BLD AUTO: 3.7 % — SIGNIFICANT CHANGE UP (ref 0–8)
GLUCOSE SERPL-MCNC: 104 MG/DL — HIGH (ref 70–99)
HCT VFR BLD CALC: 33.3 % — LOW (ref 37–47)
HGB BLD-MCNC: 9.2 G/DL — LOW (ref 12–16)
IMM GRANULOCYTES NFR BLD AUTO: 0.1 % — SIGNIFICANT CHANGE UP (ref 0.1–0.3)
LYMPHOCYTES # BLD AUTO: 1.76 K/UL — SIGNIFICANT CHANGE UP (ref 1.2–3.4)
LYMPHOCYTES # BLD AUTO: 26 % — SIGNIFICANT CHANGE UP (ref 20.5–51.1)
MCHC RBC-ENTMCNC: 20.4 PG — LOW (ref 27–31)
MCHC RBC-ENTMCNC: 27.6 G/DL — LOW (ref 32–37)
MCV RBC AUTO: 73.8 FL — LOW (ref 81–99)
MONOCYTES # BLD AUTO: 0.55 K/UL — SIGNIFICANT CHANGE UP (ref 0.1–0.6)
MONOCYTES NFR BLD AUTO: 8.1 % — SIGNIFICANT CHANGE UP (ref 1.7–9.3)
NEUTROPHILS # BLD AUTO: 4.18 K/UL — SIGNIFICANT CHANGE UP (ref 1.4–6.5)
NEUTROPHILS NFR BLD AUTO: 61.8 % — SIGNIFICANT CHANGE UP (ref 42.2–75.2)
NRBC # BLD: 0 /100 WBCS — SIGNIFICANT CHANGE UP (ref 0–0)
PLATELET # BLD AUTO: 260 K/UL — SIGNIFICANT CHANGE UP (ref 130–400)
POTASSIUM SERPL-MCNC: 4.1 MMOL/L — SIGNIFICANT CHANGE UP (ref 3.5–5)
POTASSIUM SERPL-SCNC: 4.1 MMOL/L — SIGNIFICANT CHANGE UP (ref 3.5–5)
PROT SERPL-MCNC: 6.5 G/DL — SIGNIFICANT CHANGE UP (ref 6–8)
RBC # BLD: 4.51 M/UL — SIGNIFICANT CHANGE UP (ref 4.2–5.4)
RBC # FLD: 19.8 % — HIGH (ref 11.5–14.5)
SODIUM SERPL-SCNC: 140 MMOL/L — SIGNIFICANT CHANGE UP (ref 135–146)
WBC # BLD: 6.77 K/UL — SIGNIFICANT CHANGE UP (ref 4.8–10.8)
WBC # FLD AUTO: 6.77 K/UL — SIGNIFICANT CHANGE UP (ref 4.8–10.8)

## 2020-10-25 PROCEDURE — 99233 SBSQ HOSP IP/OBS HIGH 50: CPT

## 2020-10-25 PROCEDURE — 71046 X-RAY EXAM CHEST 2 VIEWS: CPT | Mod: 26

## 2020-10-25 RX ADMIN — Medication 40 MILLIGRAM(S): at 08:28

## 2020-10-25 RX ADMIN — Medication 1 APPLICATION(S): at 06:14

## 2020-10-25 RX ADMIN — Medication 30 MILLIGRAM(S): at 12:26

## 2020-10-25 RX ADMIN — Medication 50 MILLIGRAM(S): at 05:38

## 2020-10-25 RX ADMIN — APIXABAN 5 MILLIGRAM(S): 2.5 TABLET, FILM COATED ORAL at 05:39

## 2020-10-25 RX ADMIN — Medication 50 MILLIGRAM(S): at 17:23

## 2020-10-25 RX ADMIN — Medication 30 MILLIGRAM(S): at 05:38

## 2020-10-25 RX ADMIN — Medication 30 MILLIGRAM(S): at 23:50

## 2020-10-25 RX ADMIN — Medication 40 MILLIGRAM(S): at 21:08

## 2020-10-25 RX ADMIN — LISINOPRIL 5 MILLIGRAM(S): 2.5 TABLET ORAL at 05:38

## 2020-10-25 RX ADMIN — IRON SUCROSE 210 MILLIGRAM(S): 20 INJECTION, SOLUTION INTRAVENOUS at 12:52

## 2020-10-25 RX ADMIN — Medication 75 MICROGRAM(S): at 05:38

## 2020-10-25 RX ADMIN — CHLORHEXIDINE GLUCONATE 1 APPLICATION(S): 213 SOLUTION TOPICAL at 05:39

## 2020-10-25 RX ADMIN — Medication 30 MILLIGRAM(S): at 17:23

## 2020-10-25 RX ADMIN — APIXABAN 5 MILLIGRAM(S): 2.5 TABLET, FILM COATED ORAL at 17:23

## 2020-10-25 NOTE — PROGRESS NOTE ADULT - SUBJECTIVE AND OBJECTIVE BOX
DEL LEBLANC  79y  Female      Patient is a 79y old  Female who presents with a chief complaint of shortness of breath (24 Oct 2020 15:13)      INTERVAL HPI/OVERNIGHT EVENTS:  She feels ok, still with SOB with minimal activities.   Vital Signs Last 24 Hrs  T(C): 36.2 (25 Oct 2020 05:13), Max: 36.5 (24 Oct 2020 20:23)  T(F): 97.1 (25 Oct 2020 05:13), Max: 97.7 (24 Oct 2020 20:23)  HR: 111 (25 Oct 2020 08:29) (107 - 113)  BP: 98/52 (25 Oct 2020 08:29) (96/54 - 106/44)  BP(mean): --  RR: 17 (25 Oct 2020 05:13) (17 - 19)  SpO2: 100% (24 Oct 2020 20:35) (100% - 100%)      10-24-20 @ 07:01  -  10-25-20 @ 07:00  --------------------------------------------------------  IN: 660 mL / OUT: 1000 mL / NET: -340 mL            Consultant(s) Notes Reviewed:  [x ] YES  [ ] NO          MEDICATIONS  (STANDING):  apixaban 5 milliGRAM(s) Oral every 12 hours  chlorhexidine 4% Liquid 1 Application(s) Topical <User Schedule>  collagenase Ointment 1 Application(s) Topical daily  diltiazem    Tablet 30 milliGRAM(s) Oral every 6 hours  furosemide   Injectable 40 milliGRAM(s) IV Push every 12 hours  iron sucrose IVPB 100 milliGRAM(s) IV Intermittent every 24 hours  levothyroxine 75 MICROGram(s) Oral daily  lisinopril 5 milliGRAM(s) Oral daily  metoprolol tartrate 50 milliGRAM(s) Oral two times a day    MEDICATIONS  (PRN):      LABS                          9.2    6.77  )-----------( 260      ( 25 Oct 2020 06:17 )             33.3     10-25    140  |  100  |  22<H>  ----------------------------<  104<H>  4.1   |  29  |  1.2    Ca    8.8      25 Oct 2020 06:17  Phos  3.8     10-24  Mg     2.1     10-24    TPro  6.5  /  Alb  3.5  /  TBili  0.4  /  DBili  x   /  AST  17  /  ALT  10  /  AlkPhos  103  10-25          Lactate Trend        CAPILLARY BLOOD GLUCOSE            RADIOLOGY & ADDITIONAL TESTS:    Imaging Personally Reviewed:  [ ] YES  [ ] NO    HEALTH ISSUES - PROBLEM Dx:          PHYSICAL EXAM:  GENERAL: NAD, well-developed.  HEAD:  Atraumatic, Normocephalic.  EYES: EOMI, PERRLA, conjunctiva and sclera clear.  NECK: Supple, No JVD.  CHEST/LUNG: bibasilar rales.   HEART: Irregular rate and rhythm; S1 S2.   ABDOMEN: Soft, Nontender, Nondistended; Bowel sounds present.  EXTREMITIES:  2+ Peripheral Pulses, LE edema 2+  PSYCH: AAOx3.  NEUROLOGY: non-focal.  SKIN: No rashes or lesions.

## 2020-10-26 LAB
ALBUMIN SERPL ELPH-MCNC: 3.6 G/DL — SIGNIFICANT CHANGE UP (ref 3.5–5.2)
ALP SERPL-CCNC: 102 U/L — SIGNIFICANT CHANGE UP (ref 30–115)
ALT FLD-CCNC: 10 U/L — SIGNIFICANT CHANGE UP (ref 0–41)
ANION GAP SERPL CALC-SCNC: 10 MMOL/L — SIGNIFICANT CHANGE UP (ref 7–14)
AST SERPL-CCNC: 14 U/L — SIGNIFICANT CHANGE UP (ref 0–41)
BASOPHILS # BLD AUTO: 0.02 K/UL — SIGNIFICANT CHANGE UP (ref 0–0.2)
BASOPHILS NFR BLD AUTO: 0.3 % — SIGNIFICANT CHANGE UP (ref 0–1)
BILIRUB SERPL-MCNC: 0.4 MG/DL — SIGNIFICANT CHANGE UP (ref 0.2–1.2)
BUN SERPL-MCNC: 24 MG/DL — HIGH (ref 10–20)
CALCIUM SERPL-MCNC: 8.7 MG/DL — SIGNIFICANT CHANGE UP (ref 8.5–10.1)
CHLORIDE SERPL-SCNC: 101 MMOL/L — SIGNIFICANT CHANGE UP (ref 98–110)
CO2 SERPL-SCNC: 30 MMOL/L — SIGNIFICANT CHANGE UP (ref 17–32)
CREAT SERPL-MCNC: 1.2 MG/DL — SIGNIFICANT CHANGE UP (ref 0.7–1.5)
EOSINOPHIL # BLD AUTO: 0.24 K/UL — SIGNIFICANT CHANGE UP (ref 0–0.7)
EOSINOPHIL NFR BLD AUTO: 3.7 % — SIGNIFICANT CHANGE UP (ref 0–8)
GLUCOSE SERPL-MCNC: 108 MG/DL — HIGH (ref 70–99)
HCT VFR BLD CALC: 33.5 % — LOW (ref 37–47)
HGB BLD-MCNC: 9.3 G/DL — LOW (ref 12–16)
IMM GRANULOCYTES NFR BLD AUTO: 0.3 % — SIGNIFICANT CHANGE UP (ref 0.1–0.3)
LYMPHOCYTES # BLD AUTO: 1.51 K/UL — SIGNIFICANT CHANGE UP (ref 1.2–3.4)
LYMPHOCYTES # BLD AUTO: 23.1 % — SIGNIFICANT CHANGE UP (ref 20.5–51.1)
MCHC RBC-ENTMCNC: 20.3 PG — LOW (ref 27–31)
MCHC RBC-ENTMCNC: 27.8 G/DL — LOW (ref 32–37)
MCV RBC AUTO: 73 FL — LOW (ref 81–99)
MONOCYTES # BLD AUTO: 0.52 K/UL — SIGNIFICANT CHANGE UP (ref 0.1–0.6)
MONOCYTES NFR BLD AUTO: 7.9 % — SIGNIFICANT CHANGE UP (ref 1.7–9.3)
NEUTROPHILS # BLD AUTO: 4.24 K/UL — SIGNIFICANT CHANGE UP (ref 1.4–6.5)
NEUTROPHILS NFR BLD AUTO: 64.7 % — SIGNIFICANT CHANGE UP (ref 42.2–75.2)
NRBC # BLD: 0 /100 WBCS — SIGNIFICANT CHANGE UP (ref 0–0)
NT-PROBNP SERPL-SCNC: 1744 PG/ML — HIGH (ref 0–300)
PHOSPHATE SERPL-MCNC: 3.8 MG/DL — SIGNIFICANT CHANGE UP (ref 2.1–4.9)
PLATELET # BLD AUTO: 278 K/UL — SIGNIFICANT CHANGE UP (ref 130–400)
POTASSIUM SERPL-MCNC: 4.1 MMOL/L — SIGNIFICANT CHANGE UP (ref 3.5–5)
POTASSIUM SERPL-SCNC: 4.1 MMOL/L — SIGNIFICANT CHANGE UP (ref 3.5–5)
PROT SERPL-MCNC: 6.5 G/DL — SIGNIFICANT CHANGE UP (ref 6–8)
RBC # BLD: 4.59 M/UL — SIGNIFICANT CHANGE UP (ref 4.2–5.4)
RBC # FLD: 20.1 % — HIGH (ref 11.5–14.5)
SODIUM SERPL-SCNC: 141 MMOL/L — SIGNIFICANT CHANGE UP (ref 135–146)
WBC # BLD: 6.55 K/UL — SIGNIFICANT CHANGE UP (ref 4.8–10.8)
WBC # FLD AUTO: 6.55 K/UL — SIGNIFICANT CHANGE UP (ref 4.8–10.8)

## 2020-10-26 PROCEDURE — 99233 SBSQ HOSP IP/OBS HIGH 50: CPT

## 2020-10-26 PROCEDURE — 71045 X-RAY EXAM CHEST 1 VIEW: CPT | Mod: 26

## 2020-10-26 RX ORDER — METOPROLOL TARTRATE 50 MG
50 TABLET ORAL
Refills: 0 | Status: DISCONTINUED | OUTPATIENT
Start: 2020-10-26 | End: 2020-10-27

## 2020-10-26 RX ORDER — IRON SUCROSE 20 MG/ML
200 INJECTION, SOLUTION INTRAVENOUS
Refills: 0 | Status: DISCONTINUED | OUTPATIENT
Start: 2020-10-27 | End: 2020-10-28

## 2020-10-26 RX ORDER — FUROSEMIDE 40 MG
40 TABLET ORAL
Refills: 0 | Status: DISCONTINUED | OUTPATIENT
Start: 2020-10-26 | End: 2020-10-27

## 2020-10-26 RX ORDER — IRON SUCROSE 20 MG/ML
200 INJECTION, SOLUTION INTRAVENOUS ONCE
Refills: 0 | Status: COMPLETED | OUTPATIENT
Start: 2020-10-26 | End: 2020-10-26

## 2020-10-26 RX ADMIN — CHLORHEXIDINE GLUCONATE 1 APPLICATION(S): 213 SOLUTION TOPICAL at 06:13

## 2020-10-26 RX ADMIN — Medication 40 MILLIGRAM(S): at 06:13

## 2020-10-26 RX ADMIN — IRON SUCROSE 110 MILLIGRAM(S): 20 INJECTION, SOLUTION INTRAVENOUS at 15:35

## 2020-10-26 RX ADMIN — Medication 30 MILLIGRAM(S): at 06:13

## 2020-10-26 RX ADMIN — Medication 40 MILLIGRAM(S): at 17:19

## 2020-10-26 RX ADMIN — Medication 1 APPLICATION(S): at 06:14

## 2020-10-26 RX ADMIN — Medication 50 MILLIGRAM(S): at 17:19

## 2020-10-26 RX ADMIN — APIXABAN 5 MILLIGRAM(S): 2.5 TABLET, FILM COATED ORAL at 17:18

## 2020-10-26 RX ADMIN — APIXABAN 5 MILLIGRAM(S): 2.5 TABLET, FILM COATED ORAL at 06:13

## 2020-10-26 RX ADMIN — LISINOPRIL 5 MILLIGRAM(S): 2.5 TABLET ORAL at 06:13

## 2020-10-26 RX ADMIN — Medication 30 MILLIGRAM(S): at 12:26

## 2020-10-26 RX ADMIN — Medication 30 MILLIGRAM(S): at 17:19

## 2020-10-26 RX ADMIN — Medication 50 MILLIGRAM(S): at 06:13

## 2020-10-26 RX ADMIN — Medication 75 MICROGRAM(S): at 06:13

## 2020-10-26 NOTE — PROGRESS NOTE ADULT - ASSESSMENT
ASSESSMENT  78 y/o female [place]er speaking with hx CAD, CHpEF,, AFib on Eliquis,  sever bilateral knee MAUREEN, Sever MAUREEN not on CPAP, morbid obesity,  Hypothyroid presents to the ED for Shortness of breath for the last 2 months with ECHO showing EF 40-45%, HR better controlled on Cardizem 30q6, arterial duplex to evaluate R foot/lateral leg ulcer negative for any arterial flow issues, switched back to Lasix IV due to increased congestion on CXR and SOB yesterday.     PLAN    # Acute on chronic Diastolic HF   - ECHO from 2019: grade diastolic 1 dysfunction from 2019, normal EF  - ECHO 10/22: EF 40-45%  - CXR 10/25 showed increased congestion; repeat Xray on 10/26 shows improvement  - c/w IV Lasix 40 mg BID  - restart on Oral Lasix 40 mg tomorrow if improved  - Strict input / output   - 1200 ml Fluid restriction  - patient is currently asymptomatic    # Afib with RVR  - PE, CXR and ProBNP consistent with exacerbation of CHFpEF   - S/p Cardizem 10 IV and 60 PO in the ED   - HR better controlled on Cardizem 30q6; inc. to 60q6 if consistently > 100  - HR today on telemetry in 80's   - c/w Eliquis   - c/w Cardizem 30 q6     # CAD   - c/w metoprolol, lisinopril     # Hypothyroid  - c/w synthroid   - TSH 3.64     # Iron deficiency  - started on 10 days of 100mg IV iron sucrose  - c/w iron sucrose until 11/1    # Severe MAUREEN   - not on CPAP    # hx Severe bilateral knee osteoarthritis   - f/u outpatient Rheumatology    # CKD stage 3, stable  -  baseline creatinine 1.3    # Microcytic anemia:  - f/u iron studies    # HTN   - c/w lisinopril     # Morbid obesity  - need counseling on weight loss    Activity as tolerated   Diet DASH/ TLC, fluid restriction 1L   DVT PPX Eliquis   GI ppx not indicated   Dispo from home   CODE status: FULL    ASSESSMENT  78 y/o female [place]ar[place] speaking with hx CAD, CHpEF,, AFib on Eliquis,  sever bilateral knee MAUREEN, Severe MAUREEN not on CPAP, morbid obesity,  Hypothyroid presents to the ED for Shortness of breath for the last 2 months with ECHO showing EF 40-45%, HR better controlled on Cardizem 30q6, arterial duplex to evaluate R foot/lateral leg ulcer negative for any arterial flow issues, switched back to Lasix IV due to increased congestion on CXR and SOB yesterday.     PLAN    # Acute on chronic Diastolic HF   - ECHO from 2019: grade diastolic 1 dysfunction from 2019, normal EF  - ECHO 10/22: EF 40-45%  - CXR 10/25 showed increased congestion; repeat Xray on 10/26 shows improvement  - c/w IV Lasix 40 mg BID  - restart on Oral Lasix 40 mg tomorrow if improved  - Strict input / output   - 1200 ml Fluid restriction  - patient is currently asymptomatic    # Afib with RVR  - PE, CXR and ProBNP consistent with exacerbation of CHFpEF   - S/p Cardizem 10 IV and 60 PO in the ED   - HR better controlled on Cardizem 30q6; inc. to 60q6 if consistently > 100  - HR today on telemetry in 80's   - c/w Eliquis   - c/w Cardizem 30 q6     # CAD   - c/w metoprolol, lisinopril     # Hypothyroid  - c/w synthroid   - TSH 3.64     # Iron deficiency  - started on 10 days of 100mg IV iron sucrose  - c/w iron sucrose until 11/1    # Severe MAUREEN   - not on CPAP    # hx Severe bilateral knee osteoarthritis   - f/u outpatient Rheumatology    # CKD stage 3, stable  -  baseline creatinine 1.3    # Microcytic anemia:  - f/u iron studies    # HTN   - c/w lisinopril     # Morbid obesity  - need counseling on weight loss    Activity as tolerated   Diet DASH/ TLC, fluid restriction 1L   DVT PPX Eliquis   GI ppx not indicated   Dispo from home   CODE status: FULL

## 2020-10-26 NOTE — PROGRESS NOTE ADULT - SUBJECTIVE AND OBJECTIVE BOX
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    Patient is having worsening shortness of breath on exertion, lower ext swelling, Mild PND, she sleeps on 3 pillows. Denied any chest pain, palpations, N/V/D, GI or urinary Symptoms. Patient is compliant with medications and diet. She lives at home with daughter and can walk around home.     Patient follows with Dr. Berger, she was eval by pulmonary before for MAUREEN but she is not on CPAP.     In the ED Pt was HD stable,  and irregular, 10 IV Cardizem and 60 PO with improvement of HR, 250 CC IVF Pro-BNP 3300, CXR showed pulmonary congestion.    Currently admitted to medicine with the primary diagnosis of Atrial fibrillation with RVR. Today is hospital day 5.     INTERVAL HPI / OVERNIGHT EVENTS:  Patient was examined and seen at bedside. This morning she is resting comfortably in bed, and is not complaining of any shortness of breath on room air.    Patient most likely has HF exacerbation due to A.fib with RVR. Metoprolol switched to Cardizem 30q6, and furosemide increased to 60 q12. HR has been controlled (80's today)     ECHO 40-45% EF. Patient was switched back to IV Lasix 40 mg BID due to dyspnea yesterday with corresponding increased congestion on CXR, which has since improved. Will switch to oral Lasix tonight and follow up in AM.     ROS: Otherwise unremarkable     PAST MEDICAL & SURGICAL HISTORY  Abnormal cholesterol test  Acquired cataract  Acute CHF  Acquired hypothyroidism  H/O: HTN (hypertension)  Obesity, mild  History of chronic CHF      ALLERGIES  No Known Allergies    MEDICATIONS  STANDING MEDICATIONS  apixaban 5 milliGRAM(s) Oral every 12 hours  chlorhexidine 4% Liquid 1 Application(s) Topical <User Schedule>  collagenase Ointment 1 Application(s) Topical daily  diltiazem    Tablet 30 milliGRAM(s) Oral every 6 hours  levothyroxine 75 MICROGram(s) Oral daily  lisinopril 5 milliGRAM(s) Oral daily  metoprolol tartrate 50 milliGRAM(s) Oral two times a day    PRN MEDICATIONS    VITALS:  T(F): 97.2  HR: 115  BP: 121/52  RR: 18  SpO2: --    PHYSICAL EXAM  GEN: NAD, Resting comfortably in bed  PULM: Clear to auscultation in bilateral lung bases, no crackles  CVS: Regular rate and rhythm, S1-S2, no murmurs  ABD: Soft, non-tender, non-distended, no guarding  NEURO: A&Ox3, no focal deficits    LABS                        9.3    6.55  )-----------( 278      ( 26 Oct 2020 08:04 )             33.5     10-26    141  |  101  |  24<H>  ----------------------------<  108<H>  4.1   |  30  |  1.2    Ca    8.7      26 Oct 2020 08:04  Phos  3.8     10-26    TPro  6.5  /  Alb  3.6  /  TBili  0.4  /  DBili  x   /  AST  14  /  ALT  10  /  AlkPhos  102  10-26      RADIOLOGY    X ray Chest 10/25  Right middle lung linear scarring/atelectasis. Bilateral perihilar opacities, slightly increased. No pneumothorax or pleural effusion.       HPI  80 y/o female [place] speaking with hx  CHpEF, DLD, AFib on Eliquis, severe bilateral knee MAUREEN, Sever MAUREEN not on CPAP, morbid obesity,  Hypothyroid presents to the ED for Shortness of breath for the last 2 months.    Patient is having worsening shortness of breath on exertion, lower ext swelling, Mild PND, she sleeps on 3 pillows. Denied any chest pain, palpations, N/V/D, GI or urinary Symptoms. Patient is compliant with medications and diet. She lives at home with daughter and can walk around home.     Patient follows with Dr. Berger, she was eval by pulmonary before for MAUREEN but she is not on CPAP.     In the ED Pt was HD stable,  and irregular, 10 IV Cardizem and 60 PO with improvement of HR, 250 CC IVF Pro-BNP 3300, CXR showed pulmonary congestion.    Currently admitted to medicine with the primary diagnosis of Atrial fibrillation with RVR. Today is hospital day 5.     INTERVAL HPI / OVERNIGHT EVENTS:  Patient was examined and seen at bedside. This morning she is resting comfortably in bed, and is not complaining of any shortness of breath on room air.    Patient most likely has HF exacerbation due to A.fib with RVR. Metoprolol switched to Cardizem 30q6, and furosemide increased to 60 q12. HR has been controlled (80's today)     ECHO 40-45% EF. Patient was switched back to IV Lasix 40 mg BID due to dyspnea yesterday with corresponding increased congestion on CXR, which has since improved. Will switch to oral Lasix tonight and follow up in AM.     ROS: Otherwise unremarkable     PAST MEDICAL & SURGICAL HISTORY  Abnormal cholesterol test  Acquired cataract  Acute CHF  Acquired hypothyroidism  H/O: HTN (hypertension)  Obesity, mild  History of chronic CHF      ALLERGIES  No Known Allergies    MEDICATIONS  STANDING MEDICATIONS  apixaban 5 milliGRAM(s) Oral every 12 hours  chlorhexidine 4% Liquid 1 Application(s) Topical <User Schedule>  collagenase Ointment 1 Application(s) Topical daily  diltiazem    Tablet 30 milliGRAM(s) Oral every 6 hours  levothyroxine 75 MICROGram(s) Oral daily  lisinopril 5 milliGRAM(s) Oral daily  metoprolol tartrate 50 milliGRAM(s) Oral two times a day    PRN MEDICATIONS    VITALS:  T(F): 97.2  HR: 115  BP: 121/52  RR: 18  SpO2: --    PHYSICAL EXAM  GEN: NAD, Resting comfortably in bed  PULM: Clear to auscultation in bilateral lung bases, no crackles  CVS: IRRegular rate and rhythm,   ABD: Soft, non-tender, non-distended, no guarding  NEURO: A&Ox3, no focal deficits  LE : edema     LABS                        9.3    6.55  )-----------( 278      ( 26 Oct 2020 08:04 )             33.5     10-26    141  |  101  |  24<H>  ----------------------------<  108<H>  4.1   |  30  |  1.2    Ca    8.7      26 Oct 2020 08:04  Phos  3.8     10-26    TPro  6.5  /  Alb  3.6  /  TBili  0.4  /  DBili  x   /  AST  14  /  ALT  10  /  AlkPhos  102  10-26      RADIOLOGY    X ray Chest 10/25  Right middle lung linear scarring/atelectasis. Bilateral perihilar opacities, slightly increased. No pneumothorax or pleural effusion.

## 2020-10-27 LAB
ANION GAP SERPL CALC-SCNC: 11 MMOL/L — SIGNIFICANT CHANGE UP (ref 7–14)
BASOPHILS # BLD AUTO: 0.02 K/UL — SIGNIFICANT CHANGE UP (ref 0–0.2)
BASOPHILS NFR BLD AUTO: 0.3 % — SIGNIFICANT CHANGE UP (ref 0–1)
BUN SERPL-MCNC: 26 MG/DL — HIGH (ref 10–20)
CALCIUM SERPL-MCNC: 8.8 MG/DL — SIGNIFICANT CHANGE UP (ref 8.5–10.1)
CHLORIDE SERPL-SCNC: 101 MMOL/L — SIGNIFICANT CHANGE UP (ref 98–110)
CO2 SERPL-SCNC: 28 MMOL/L — SIGNIFICANT CHANGE UP (ref 17–32)
CREAT SERPL-MCNC: 1.1 MG/DL — SIGNIFICANT CHANGE UP (ref 0.7–1.5)
EOSINOPHIL # BLD AUTO: 0.21 K/UL — SIGNIFICANT CHANGE UP (ref 0–0.7)
EOSINOPHIL NFR BLD AUTO: 3.1 % — SIGNIFICANT CHANGE UP (ref 0–8)
GLUCOSE SERPL-MCNC: 96 MG/DL — SIGNIFICANT CHANGE UP (ref 70–99)
HCT VFR BLD CALC: 30.8 % — LOW (ref 37–47)
HGB BLD-MCNC: 8.8 G/DL — LOW (ref 12–16)
IMM GRANULOCYTES NFR BLD AUTO: 0.3 % — SIGNIFICANT CHANGE UP (ref 0.1–0.3)
LYMPHOCYTES # BLD AUTO: 1.4 K/UL — SIGNIFICANT CHANGE UP (ref 1.2–3.4)
LYMPHOCYTES # BLD AUTO: 20.7 % — SIGNIFICANT CHANGE UP (ref 20.5–51.1)
MAGNESIUM SERPL-MCNC: 2.2 MG/DL — SIGNIFICANT CHANGE UP (ref 1.8–2.4)
MCHC RBC-ENTMCNC: 20.8 PG — LOW (ref 27–31)
MCHC RBC-ENTMCNC: 28.6 G/DL — LOW (ref 32–37)
MCV RBC AUTO: 72.8 FL — LOW (ref 81–99)
MONOCYTES # BLD AUTO: 0.58 K/UL — SIGNIFICANT CHANGE UP (ref 0.1–0.6)
MONOCYTES NFR BLD AUTO: 8.6 % — SIGNIFICANT CHANGE UP (ref 1.7–9.3)
NEUTROPHILS # BLD AUTO: 4.53 K/UL — SIGNIFICANT CHANGE UP (ref 1.4–6.5)
NEUTROPHILS NFR BLD AUTO: 67 % — SIGNIFICANT CHANGE UP (ref 42.2–75.2)
NRBC # BLD: 0 /100 WBCS — SIGNIFICANT CHANGE UP (ref 0–0)
PHOSPHATE SERPL-MCNC: 4.1 MG/DL — SIGNIFICANT CHANGE UP (ref 2.1–4.9)
PLATELET # BLD AUTO: 256 K/UL — SIGNIFICANT CHANGE UP (ref 130–400)
POTASSIUM SERPL-MCNC: 3.7 MMOL/L — SIGNIFICANT CHANGE UP (ref 3.5–5)
POTASSIUM SERPL-SCNC: 3.7 MMOL/L — SIGNIFICANT CHANGE UP (ref 3.5–5)
RBC # BLD: 4.23 M/UL — SIGNIFICANT CHANGE UP (ref 4.2–5.4)
RBC # FLD: 20.1 % — HIGH (ref 11.5–14.5)
SARS-COV-2 RNA SPEC QL NAA+PROBE: SIGNIFICANT CHANGE UP
SODIUM SERPL-SCNC: 140 MMOL/L — SIGNIFICANT CHANGE UP (ref 135–146)
WBC # BLD: 6.76 K/UL — SIGNIFICANT CHANGE UP (ref 4.8–10.8)
WBC # FLD AUTO: 6.76 K/UL — SIGNIFICANT CHANGE UP (ref 4.8–10.8)

## 2020-10-27 PROCEDURE — 99233 SBSQ HOSP IP/OBS HIGH 50: CPT

## 2020-10-27 PROCEDURE — 99232 SBSQ HOSP IP/OBS MODERATE 35: CPT

## 2020-10-27 RX ORDER — FUROSEMIDE 40 MG
40 TABLET ORAL DAILY
Refills: 0 | Status: DISCONTINUED | OUTPATIENT
Start: 2020-10-27 | End: 2020-10-27

## 2020-10-27 RX ORDER — METOPROLOL TARTRATE 50 MG
50 TABLET ORAL
Refills: 0 | Status: DISCONTINUED | OUTPATIENT
Start: 2020-10-27 | End: 2020-10-29

## 2020-10-27 RX ORDER — FUROSEMIDE 40 MG
40 TABLET ORAL
Refills: 0 | Status: DISCONTINUED | OUTPATIENT
Start: 2020-10-27 | End: 2020-10-27

## 2020-10-27 RX ORDER — FUROSEMIDE 40 MG
40 TABLET ORAL ONCE
Refills: 0 | Status: COMPLETED | OUTPATIENT
Start: 2020-10-27 | End: 2020-10-27

## 2020-10-27 RX ADMIN — Medication 30 MILLIGRAM(S): at 06:18

## 2020-10-27 RX ADMIN — Medication 40 MILLIGRAM(S): at 21:38

## 2020-10-27 RX ADMIN — Medication 40 MILLIGRAM(S): at 11:57

## 2020-10-27 RX ADMIN — Medication 1 APPLICATION(S): at 06:19

## 2020-10-27 RX ADMIN — APIXABAN 5 MILLIGRAM(S): 2.5 TABLET, FILM COATED ORAL at 06:18

## 2020-10-27 RX ADMIN — IRON SUCROSE 110 MILLIGRAM(S): 20 INJECTION, SOLUTION INTRAVENOUS at 08:49

## 2020-10-27 RX ADMIN — Medication 50 MILLIGRAM(S): at 06:18

## 2020-10-27 RX ADMIN — LISINOPRIL 5 MILLIGRAM(S): 2.5 TABLET ORAL at 06:18

## 2020-10-27 RX ADMIN — Medication 75 MICROGRAM(S): at 06:18

## 2020-10-27 RX ADMIN — Medication 50 MILLIGRAM(S): at 19:00

## 2020-10-27 RX ADMIN — Medication 50 MILLIGRAM(S): at 11:58

## 2020-10-27 RX ADMIN — Medication 40 MILLIGRAM(S): at 06:18

## 2020-10-27 RX ADMIN — APIXABAN 5 MILLIGRAM(S): 2.5 TABLET, FILM COATED ORAL at 19:00

## 2020-10-27 RX ADMIN — Medication 50 MILLIGRAM(S): at 23:41

## 2020-10-27 NOTE — PHYSICAL EXAM
[Ankle Swelling (On Exam)] : present [Ankle Swelling On The Right] : of the right ankle [Varicose Veins Of Lower Extremities] : not present [de-identified] : Pain on light touch to wound site, Right lateral aspect [FreeTextEntry1] : Wound to lateral aspect of Right ankle. Fibrogranular wound base with rolled edge. \par Measurement: 7.5 cm x 4 cm x 0.25 cm \par

## 2020-10-27 NOTE — PROGRESS NOTE ADULT - ASSESSMENT
ASSESSMENT  80 y/o female [place]riot speaking with hx CAD, CHpEF,, AFib on Eliquis,  sever bilateral knee MAUREEN, Severe MAUREEN not on CPAP, morbid obesity,  Hypothyroid presents to the ED for Shortness of breath for the last 2 months with ECHO showing EF 40-45%, HR better controlled on Cardizem 30q6, arterial duplex to evaluate R foot/lateral leg ulcer negative for any arterial flow issues, continuing on 40 Lasix IV due to continued congestion on CXR and SOB since yesterday    PLAN    # Acute on chronic Diastolic HF   - ECHO from 2019: grade diastolic 1 dysfunction from 2019, normal EF  - ECHO 10/22: EF 40-45%  - CXR 10/25 showed increased congestion; repeat Xray on 10/26 shows improvement  - c/w IV Lasix 40 mg BID  - restart on Oral Lasix 40 mg tomorrow if improved  - Strict input / output   - 1200 ml Fluid restriction  - patient is asymptomatic at rest  - f/u ambulatory pulse ox/HR  - stress test when adequately diuresed as recc'd by Dr. Berger, Cardiology    # Afib with RVR  - PE, CXR and ProBNP consistent with exacerbation of CHFpEF   - S/p Cardizem 10 IV and 60 PO in the ED   - HR better controlled on Cardizem 30q6; inc. to 60q6 if consistently > 100  - HR today up to 120's   - c/w Eliquis   - switched to Metoprolol 50 q6 today  - c/w Cardizem 30 q6     # CAD   - c/w metoprolol, lisinopril     # Hypothyroid  - c/w synthroid   - TSH 3.64     # Iron deficiency  - started on 10 days of 100mg IV iron sucrose  - c/w iron sucrose until 11/1    # Severe MAUREEN   - not on CPAP  - needs Pulm f/u outpt on d/c    # Severe bilateral knee osteoarthritis   - f/u outpatient Rheumatology    # CKD stage 3, stable  -  baseline creatinine 1.3    # Microcytic anemia:  - iron deficient  - c/w iron sucrose    # HTN   - c/w lisinopril     # Morbid obesity  - need counseling on weight loss    Activity as tolerated   Diet DASH/ TLC, fluid restriction 1L   DVT PPX Eliquis   GI ppx not indicated   Dispo from home   CODE status: FULL

## 2020-10-27 NOTE — PROGRESS NOTE ADULT - ASSESSMENT
Acute systolic chf EF40-45%   c/w IV lasix 40 BID for today   lasix was held this am because BP was on low side   chest xray shows decreased edema   EF 40-45% possibly tachycardia induced   spoke to cardiology will need stress test when more diuresed will give one more day of IV lasix as per cardiology recommendations prior to ischemic work up   on lisinopril     Afib RVR:   increase lopressor to 50 q6h HR 80s to 100 on tele   on cardizem 30 q6h given low EF ans soft BP hold cardizem for now and monitor HR on lopressor 50 q6h   on eliquis  TSH normal     Iron deficiency anemia: on IV IRON needs a total of 1000 mg      history of MAUREEN will need to follow up with pulm as OPT to get CPAP       Acute systolic chf EF40-45%   c/w IV lasix 40 BID for today   lasix was held this am because BP was on low side   chest xray shows decreased edema   EF 40-45% possibly tachycardia induced   spoke to cardiology will need stress test when more diuresed will give one more day of IV lasix as per cardiology recommendations prior to ischemic work up   on lisinopril will hold for now and resume once BP tolerates     Afib RVR:   increased lopressor to 50 q6h HR 80s to 100 on tele   on cardizem 30 q6h given low EF and soft BP hold cardizem for now and monitor HR on lopressor 50 q6h.   on eliquis  TSH normal     Iron deficiency anemia: on IV IRON needs a total of 1000 mg      history of MAUREEN will need to follow up with pulm as OPT to get CPAP

## 2020-10-27 NOTE — PROGRESS NOTE ADULT - SUBJECTIVE AND OBJECTIVE BOX
HPI  78 y/o female [place] speaking with hx  CHpEF,DLD, AFib on Eliquis, sever bilateral knee MAUREEN, Sever MAUREEN not on CPAP, morbid obesity, Hypothyroid presents to the ED for Shortness of breath for 2 months.    Patient is having worsening shortness of breath on exertion, lower ext swelling, Mild PND, she sleeps on 3 pillows. Denied any chest pain, palpations, N/V/D, GI or urinary Symptoms. Patient is compliant with medications and diet. she lives at home with daughter and can walk around home.   Patient follows with Dr. Berger, she was [place] by pulmonary before for MAUREEN but she is not on CPAP.     In the ED Pt was Hd stable,,  and irregular, 10 IV Cardizem and 60 PO with improvement of HR, 250 CC IVF Pro- bnp 3300, CXR showed pulmonary congestion. (21 Oct 2020 19:38)    Currently admitted to medicine with the primary diagnosis of Atrial fibrillation with RVR. Today is hospital day 6.     INTERVAL HPI / OVERNIGHT EVENTS:  Patient was examined and seen at bedside. This morning she is resting comfortably in bed and reports no new issues or overnight events.     She was started on IV Lasix 60 mg BID on admission for HF exac. with A.fib & RVR with improvement, so she was transitioned to oral, when she developed significant fluid overload in her lungs on 10/25 (seen on Xray) leading to tachypnea on exertion.     Started back on IV 40 yesterday and she reports that her dyspnea has improved.     Stress test planned for when patient is rate controlled + euvolemic (-1L as recc'd by Cardiology). Metoprolol increased to 50 q4 yesterday.     ROS: Otherwise unremarkable     PAST MEDICAL & SURGICAL HISTORY  Abnormal cholesterol test  Acquired cataract  Acute CHF  Acquired hypothyroidism  H/O: HTN (hypertension)  Obesity, mild  History of chronic CHF      ALLERGIES  No Known Allergies    MEDICATIONS  STANDING MEDICATIONS  apixaban 5 milliGRAM(s) Oral every 12 hours  chlorhexidine 4% Liquid 1 Application(s) Topical <User Schedule>  collagenase Ointment 1 Application(s) Topical daily  furosemide   Injectable 40 milliGRAM(s) IV Push two times a day  iron sucrose IVPB 200 milliGRAM(s) IV Intermittent <User Schedule>  levothyroxine 75 MICROGram(s) Oral daily  metoprolol tartrate 50 milliGRAM(s) Oral four times a day    PRN MEDICATIONS    VITALS:  T(F): 96.4  HR: 120  BP: 120/71  RR: 18  SpO2: 99%    PHYSICAL EXAM  GEN: NAD, Resting comfortably in bed  PULM: Clear to auscultation bilaterally, No wheezes  CVS: Regular rate and rhythm, S1-S2, no murmurs  EXT: No pedal edema  NEURO: A&Ox3, no focal deficits    LABS                        8.8    6.76  )-----------( 256      ( 27 Oct 2020 06:21 )             30.8     10-27    140  |  101  |  26<H>  ----------------------------<  96  3.7   |  28  |  1.1    Ca    8.8      27 Oct 2020 06:21  Phos  4.1     10-27  Mg     2.2     10-27    TPro  6.5  /  Alb  3.6  /  TBili  0.4  /  DBili  x   /  AST  14  /  ALT  10  /  AlkPhos  102  10-26      RADIOLOGY    X ray Chest 10/26  Decreased perihilar opacities. Right midlung linear scarring/atelectasis. No pneumothorax. No significant pleural effusion on frontal view.    X ray Chest 10/25  Right middle lung linear scarring/atelectasis. Bilateral perihilar opacities, slightly increased. No pneumothorax or pleural effusion.      TTE Echo Complete w/o Contrast w/ Doppler 10/22   1. Left ventricular ejection fraction, by visual estimation, is 40 to 45%.   2. Mildly increased LV wall thickness.   3. Moderately enlarged left atrium.   4. Normal right atrial size.   5. Mild to moderate mitral valve regurgitation.   6. Mild tricuspid regurgitation.   7. Mild aortic regurgitation.   8. Sclerotic aortic valve with decreased opening.   9. Mild pulmonic valve regurgitation.

## 2020-10-27 NOTE — PROGRESS NOTE ADULT - SUBJECTIVE AND OBJECTIVE BOX
no chest pain   SOB better   walked today and was somewhat SOB after exertion but was 99% on room air     HR on tele 80-low 100s at rest     ROS otherwise negative     Vital Signs Last 24 Hrs  T(C): 36.3 (27 Oct 2020 05:07), Max: 36.3 (27 Oct 2020 05:07)  T(F): 97.3 (27 Oct 2020 05:07), Max: 97.3 (27 Oct 2020 05:07)  HR: 114 (27 Oct 2020 05:07) (83 - 114)  BP: 117/50 (27 Oct 2020 06:14) (80/48 - 117/50)  BP(mean): --  RR: 18 (27 Oct 2020 05:07) (18 - 19)  SpO2: 99% (26 Oct 2020 20:02) (99% - 99%)    PHYSICAL EXAM:  GENERAL: NAD,  pulm: Clear to auscultation bilaterally; No wheeze  CV: Regular rate and rhythm; No murmurs, rubs, or gallops  GI: Soft, Nontender, Nondistended; Bowel sounds present  EXTREMITIES:  2+ Peripheral Pulses, No clubbing, cyanosis, or edema  PSYCH: AAOx3  NEUROLOGY: non-focal  SKIN: No rashes                           8.8    6.76  )-----------( 256      ( 27 Oct 2020 06:21 )             30.8     10-27    140  |  101  |  26<H>  ----------------------------<  96  3.7   |  28  |  1.1    Ca    8.8      27 Oct 2020 06:21  Phos  4.1     10-27  Mg     2.2     10-27    TPro  6.5  /  Alb  3.6  /  TBili  0.4  /  DBili  x   /  AST  14  /  ALT  10  /  AlkPhos  102  10-26    LIVER FUNCTIONS - ( 26 Oct 2020 08:04 )  Alb: 3.6 g/dL / Pro: 6.5 g/dL / ALK PHOS: 102 U/L / ALT: 10 U/L / AST: 14 U/L / GGT: x           MEDICATIONS  (STANDING):  apixaban 5 milliGRAM(s) Oral every 12 hours  chlorhexidine 4% Liquid 1 Application(s) Topical <User Schedule>  collagenase Ointment 1 Application(s) Topical daily  diltiazem    Tablet 30 milliGRAM(s) Oral every 6 hours  furosemide   Injectable 40 milliGRAM(s) IV Push two times a day  iron sucrose IVPB 200 milliGRAM(s) IV Intermittent <User Schedule>  levothyroxine 75 MICROGram(s) Oral daily  lisinopril 5 milliGRAM(s) Oral daily  metoprolol tartrate 50 milliGRAM(s) Oral four times a day    MEDICATIONS  (PRN):

## 2020-10-27 NOTE — PROGRESS NOTE ADULT - SUBJECTIVE AND OBJECTIVE BOX
CC:  SOB      MEDICATIONS  (STANDING):  apixaban 5 milliGRAM(s) Oral every 12 hours  chlorhexidine 4% Liquid 1 Application(s) Topical <User Schedule>  collagenase Ointment 1 Application(s) Topical daily  diltiazem    Tablet 30 milliGRAM(s) Oral every 6 hours  furosemide   Injectable 40 milliGRAM(s) IV Push two times a day  iron sucrose IVPB 200 milliGRAM(s) IV Intermittent <User Schedule>  levothyroxine 75 MICROGram(s) Oral daily  lisinopril 5 milliGRAM(s) Oral daily  metoprolol tartrate 50 milliGRAM(s) Oral four times a day      ROS:  CV: No chest pain, shortness of breath or palpitations    PHYSICAL EXAM:  Vital Signs Last 24 Hrs  T(C): 36.3 (27 Oct 2020 05:07), Max: 36.3 (27 Oct 2020 05:07)  T(F): 97.3 (27 Oct 2020 05:07), Max: 97.3 (27 Oct 2020 05:07)  HR: 114 (27 Oct 2020 05:07) (83 - 114)  BP: 117/50 (27 Oct 2020 06:14) (80/48 - 117/50)  BP(mean): --  RR: 18 (27 Oct 2020 05:07) (18 - 19)  SpO2: 99% (26 Oct 2020 20:02) (99% - 99%)    General: NAD, AAOx3  HEENT: NCAT, EOMI  Neck: supple, no JVD  CV: irregular, no murmurs   Lungs: CTA bilaterally, no wheeze, rales or rhonchi  Abdomen: soft, NT/ND, +BS  Extremities: ROM nl, no clubbing, cyanosis or edema  Neurologic: Nonfocal                            8.8    6.76  )-----------( 256      ( 27 Oct 2020 06:21 )             30.8         10-26    141  |  101  |  24<H>  ----------------------------<  108<H>  4.1   |  30  |  1.2    Ca    8.7      26 Oct 2020 08:04  Phos  3.8     10-26    TPro  6.5  /  Alb  3.6  /  TBili  0.4  /  DBili  x   /  AST  14  /  ALT  10  /  AlkPhos  102  10-26        < from: TTE Echo Complete w/o Contrast w/ Doppler (10.22.20 @ 13:07) >  Summary:   1. Left ventricular ejection fraction, by visual estimation, is 40 to 45%.   2. Mildly increased LV wall thickness.   3. Moderately enlarged left atrium.   4. Normal right atrial size.   5. Mild to moderate mitral valve regurgitation.   6. Mild tricuspid regurgitation.   7. Mild aortic regurgitation.   8. Sclerotic aortic valve with decreased opening.   9. Mild pulmonic valve regurgitation.    < end of copied text >   CC:  SOB      MEDICATIONS  (STANDING):  apixaban 5 milliGRAM(s) Oral every 12 hours  chlorhexidine 4% Liquid 1 Application(s) Topical <User Schedule>  collagenase Ointment 1 Application(s) Topical daily  diltiazem    Tablet 30 milliGRAM(s) Oral every 6 hours  furosemide   Injectable 40 milliGRAM(s) IV Push two times a day  iron sucrose IVPB 200 milliGRAM(s) IV Intermittent <User Schedule>  levothyroxine 75 MICROGram(s) Oral daily  lisinopril 5 milliGRAM(s) Oral daily  metoprolol tartrate 50 milliGRAM(s) Oral four times a day      ROS:  CV: No chest pain, shortness of breath or palpitations    PHYSICAL EXAM:  Vital Signs Last 24 Hrs  T(C): 36.3 (27 Oct 2020 05:07), Max: 36.3 (27 Oct 2020 05:07)  T(F): 97.3 (27 Oct 2020 05:07), Max: 97.3 (27 Oct 2020 05:07)  HR: 114 (27 Oct 2020 05:07) (83 - 114)  BP: 117/50 (27 Oct 2020 06:14) (80/48 - 117/50)  BP(mean): --  RR: 18 (27 Oct 2020 05:07) (18 - 19)  SpO2: 99% (26 Oct 2020 20:02) (99% - 99%)    General: NAD, AAOx3  HEENT: NCAT, EOMI  Neck: supple, no JVD  CV: irregular, no murmurs   Lungs: CTA bilaterally, no wheeze, rales or rhonchi  Abdomen: soft, NT/ND, +BS  Extremities: ROM nl, no clubbing, cyanosis or edema  Neurologic: Nonfocal                            8.8    6.76  )-----------( 256      ( 27 Oct 2020 06:21 )             30.8         10-26    141  |  101  |  24<H>  ----------------------------<  108<H>  4.1   |  30  |  1.2    Ca    8.7      26 Oct 2020 08:04  Phos  3.8     10-26    TPro  6.5  /  Alb  3.6  /  TBili  0.4  /  DBili  x   /  AST  14  /  ALT  10  /  AlkPhos  102  10-26          < from: TTE Echo Complete w/o Contrast w/ Doppler (10.22.20 @ 13:07) >  Summary:   1. Left ventricular ejection fraction, by visual estimation, is 40 to 45%.   2. Mildly increased LV wall thickness.   3. Moderately enlarged left atrium.   4. Normal right atrial size.   5. Mild to moderate mitral valve regurgitation.   6. Mild tricuspid regurgitation.   7. Mild aortic regurgitation.   8. Sclerotic aortic valve with decreased opening.   9. Mild pulmonic valve regurgitation.    < end of copied text >

## 2020-10-27 NOTE — PROGRESS NOTE ADULT - ASSESSMENT
AF with RVR    Recommendations to follow AF with RVR - now rate controlled 70s at rest  Acute Systolic CHF - improved on IV Lasix  Morbid obesity    New LV dysfunction (EF 40-45%) - possibly tachycardia-induced?    Troponin negative    May need one more day of IV diuresis if not net (-) 1L  Check HR with ambulation  Continue Eliquis  Further workup of new LV dysfunction after rate controlled and adequately diuresed

## 2020-10-28 DIAGNOSIS — L97.519 NON-PRESSURE CHRONIC ULCER OF OTHER PART OF RIGHT FOOT WITH UNSPECIFIED SEVERITY: ICD-10-CM

## 2020-10-28 DIAGNOSIS — I73.9 PERIPHERAL VASCULAR DISEASE, UNSPECIFIED: ICD-10-CM

## 2020-10-28 DIAGNOSIS — I83.009 VARICOSE VEINS OF UNSPECIFIED LOWER EXTREMITY WITH ULCER OF UNSPECIFIED SITE: ICD-10-CM

## 2020-10-28 LAB
ANION GAP SERPL CALC-SCNC: 13 MMOL/L — SIGNIFICANT CHANGE UP (ref 7–14)
BASOPHILS # BLD AUTO: 0.02 K/UL — SIGNIFICANT CHANGE UP (ref 0–0.2)
BASOPHILS NFR BLD AUTO: 0.3 % — SIGNIFICANT CHANGE UP (ref 0–1)
BUN SERPL-MCNC: 21 MG/DL — HIGH (ref 10–20)
CALCIUM SERPL-MCNC: 8.8 MG/DL — SIGNIFICANT CHANGE UP (ref 8.5–10.1)
CHLORIDE SERPL-SCNC: 103 MMOL/L — SIGNIFICANT CHANGE UP (ref 98–110)
CO2 SERPL-SCNC: 25 MMOL/L — SIGNIFICANT CHANGE UP (ref 17–32)
CREAT SERPL-MCNC: 1.1 MG/DL — SIGNIFICANT CHANGE UP (ref 0.7–1.5)
EOSINOPHIL # BLD AUTO: 0.19 K/UL — SIGNIFICANT CHANGE UP (ref 0–0.7)
EOSINOPHIL NFR BLD AUTO: 2.6 % — SIGNIFICANT CHANGE UP (ref 0–8)
GLUCOSE SERPL-MCNC: 90 MG/DL — SIGNIFICANT CHANGE UP (ref 70–99)
HCT VFR BLD CALC: 33.8 % — LOW (ref 37–47)
HGB BLD-MCNC: 9.4 G/DL — LOW (ref 12–16)
IMM GRANULOCYTES NFR BLD AUTO: 0.4 % — HIGH (ref 0.1–0.3)
LYMPHOCYTES # BLD AUTO: 1.31 K/UL — SIGNIFICANT CHANGE UP (ref 1.2–3.4)
LYMPHOCYTES # BLD AUTO: 17.9 % — LOW (ref 20.5–51.1)
MAGNESIUM SERPL-MCNC: 2.1 MG/DL — SIGNIFICANT CHANGE UP (ref 1.8–2.4)
MCHC RBC-ENTMCNC: 20.5 PG — LOW (ref 27–31)
MCHC RBC-ENTMCNC: 27.8 G/DL — LOW (ref 32–37)
MCV RBC AUTO: 73.6 FL — LOW (ref 81–99)
MONOCYTES # BLD AUTO: 0.61 K/UL — HIGH (ref 0.1–0.6)
MONOCYTES NFR BLD AUTO: 8.3 % — SIGNIFICANT CHANGE UP (ref 1.7–9.3)
NEUTROPHILS # BLD AUTO: 5.15 K/UL — SIGNIFICANT CHANGE UP (ref 1.4–6.5)
NEUTROPHILS NFR BLD AUTO: 70.5 % — SIGNIFICANT CHANGE UP (ref 42.2–75.2)
NRBC # BLD: 0 /100 WBCS — SIGNIFICANT CHANGE UP (ref 0–0)
PHOSPHATE SERPL-MCNC: 3.7 MG/DL — SIGNIFICANT CHANGE UP (ref 2.1–4.9)
PLATELET # BLD AUTO: 212 K/UL — SIGNIFICANT CHANGE UP (ref 130–400)
POTASSIUM SERPL-MCNC: 4.2 MMOL/L — SIGNIFICANT CHANGE UP (ref 3.5–5)
POTASSIUM SERPL-SCNC: 4.2 MMOL/L — SIGNIFICANT CHANGE UP (ref 3.5–5)
RBC # BLD: 4.59 M/UL — SIGNIFICANT CHANGE UP (ref 4.2–5.4)
RBC # FLD: 21.1 % — HIGH (ref 11.5–14.5)
SODIUM SERPL-SCNC: 141 MMOL/L — SIGNIFICANT CHANGE UP (ref 135–146)
WBC # BLD: 7.31 K/UL — SIGNIFICANT CHANGE UP (ref 4.8–10.8)
WBC # FLD AUTO: 7.31 K/UL — SIGNIFICANT CHANGE UP (ref 4.8–10.8)

## 2020-10-28 PROCEDURE — 92960 CARDIOVERSION ELECTRIC EXT: CPT

## 2020-10-28 PROCEDURE — 99232 SBSQ HOSP IP/OBS MODERATE 35: CPT

## 2020-10-28 RX ORDER — AMIODARONE HYDROCHLORIDE 400 MG/1
1 TABLET ORAL
Qty: 900 | Refills: 0 | Status: DISCONTINUED | OUTPATIENT
Start: 2020-10-28 | End: 2020-10-29

## 2020-10-28 RX ORDER — FUROSEMIDE 40 MG
40 TABLET ORAL DAILY
Refills: 0 | Status: DISCONTINUED | OUTPATIENT
Start: 2020-10-28 | End: 2020-10-30

## 2020-10-28 RX ORDER — IRON SUCROSE 20 MG/ML
100 INJECTION, SOLUTION INTRAVENOUS ONCE
Refills: 0 | Status: COMPLETED | OUTPATIENT
Start: 2020-10-29 | End: 2020-10-29

## 2020-10-28 RX ORDER — AMIODARONE HYDROCHLORIDE 400 MG/1
0.5 TABLET ORAL
Qty: 900 | Refills: 0 | Status: DISCONTINUED | OUTPATIENT
Start: 2020-10-28 | End: 2020-10-29

## 2020-10-28 RX ORDER — AMIODARONE HYDROCHLORIDE 400 MG/1
150 TABLET ORAL ONCE
Refills: 0 | Status: COMPLETED | OUTPATIENT
Start: 2020-10-28 | End: 2020-10-28

## 2020-10-28 RX ADMIN — Medication 50 MILLIGRAM(S): at 12:06

## 2020-10-28 RX ADMIN — Medication 1 APPLICATION(S): at 05:51

## 2020-10-28 RX ADMIN — Medication 100 MILLIGRAM(S): at 21:55

## 2020-10-28 RX ADMIN — AMIODARONE HYDROCHLORIDE 33.3 MG/MIN: 400 TABLET ORAL at 18:06

## 2020-10-28 RX ADMIN — IRON SUCROSE 110 MILLIGRAM(S): 20 INJECTION, SOLUTION INTRAVENOUS at 09:31

## 2020-10-28 RX ADMIN — CHLORHEXIDINE GLUCONATE 1 APPLICATION(S): 213 SOLUTION TOPICAL at 05:08

## 2020-10-28 RX ADMIN — Medication 75 MICROGRAM(S): at 05:08

## 2020-10-28 RX ADMIN — AMIODARONE HYDROCHLORIDE 600 MILLIGRAM(S): 400 TABLET ORAL at 17:44

## 2020-10-28 RX ADMIN — APIXABAN 5 MILLIGRAM(S): 2.5 TABLET, FILM COATED ORAL at 17:44

## 2020-10-28 RX ADMIN — Medication 50 MILLIGRAM(S): at 05:08

## 2020-10-28 RX ADMIN — Medication 50 MILLIGRAM(S): at 17:44

## 2020-10-28 RX ADMIN — APIXABAN 5 MILLIGRAM(S): 2.5 TABLET, FILM COATED ORAL at 05:08

## 2020-10-28 RX ADMIN — Medication 40 MILLIGRAM(S): at 09:22

## 2020-10-28 NOTE — PROGRESS NOTE ADULT - SUBJECTIVE AND OBJECTIVE BOX
no chest pain   SOB much better     ROS otherwise negative     Vital Signs Last 24 Hrs  T(C): 36.7 (28 Oct 2020 04:52), Max: 36.7 (28 Oct 2020 04:52)  T(F): 98 (28 Oct 2020 04:52), Max: 98 (28 Oct 2020 04:52)  HR: 80 (28 Oct 2020 09:20) (80 - 137)  BP: 116/72 (28 Oct 2020 09:20) (91/53 - 120/71)  BP(mean): --  RR: 18 (28 Oct 2020 04:52) (18 - 22)  SpO2: 99% (28 Oct 2020 08:13) (87% - 100%)    PHYSICAL EXAM:  GENERAL: NAD,   Pulm: Clear to auscultation bilaterally; No wheeze  CV: IRRegular rate and rhythm;   GI: Soft, Nontender, Nondistended; Bowel sounds present  EXTREMITIES:  2+ Peripheral Pulses, No clubbing, cyanosis, or edema  PSYCH: AAOx3  NEUROLOGY: non-focal  SKIN: No rashes                           9.4    7.31  )-----------( 212      ( 28 Oct 2020 06:02 )             33.8     10-28    141  |  103  |  21<H>  ----------------------------<  90  4.2   |  25  |  1.1    Ca    8.8      28 Oct 2020 06:02  Phos  3.7     10-28  Mg     2.1     10-28    MEDICATIONS  (STANDING):  apixaban 5 milliGRAM(s) Oral every 12 hours  chlorhexidine 4% Liquid 1 Application(s) Topical <User Schedule>  collagenase Ointment 1 Application(s) Topical daily  furosemide    Tablet 40 milliGRAM(s) Oral daily  levothyroxine 75 MICROGram(s) Oral daily  metoprolol tartrate 50 milliGRAM(s) Oral four times a day    MEDICATIONS  (PRN):

## 2020-10-28 NOTE — PROGRESS NOTE ADULT - ASSESSMENT
ASSESSMENT  78 y/o female [place] speaking with hx CAD, CHpEF,, AFib on Eliquis,  sever bilateral knee MAUREEN, Severe MAUREEN not on CPAP, morbid obesity,  Hypothyroid presents to the ED for Shortness of breath for the last 2 months with ECHO showing EF 40-45%, HR better controlled on Cardizem 30q6, arterial duplex to evaluate R foot/lateral leg ulcer negative for any arterial flow issues, transitioned to 40 PO Lasix as of yesterday with JOVAN + Cardioversion today.     PLAN    # Acute on chronic Diastolic HF   - ECHO from 2019: grade diastolic 1 dysfunction from 2019, normal EF  - ECHO 10/22: EF 40-45%  - CXR 10/25 showed increased congestion; repeat Xray on 10/26 shows improvement  - c/w PO Lasix 40  - restart on Oral Lasix 40 mg tomorrow if improved  - Strict input / output   - 1200 ml Fluid restriction  - patient is asymptomatic at rest  - patient became SOB and tachycardic when ambulating yesterday  - Planned for JOVAN with cardioversion today.   - stress test when adequately diuresed as recc'd by Dr. Berger, Cardiology    # Afib with RVR  - PE, CXR and ProBNP consistent with exacerbation of CHFpEF   - S/p Cardizem 10 IV and 60 PO in the ED   - HR better controlled on Cardizem 30q6; inc. to 60q6 if consistently > 100  - HR today up to 120's   - c/w Eliquis   - c/w Metoprolol 50 q6   - c/w Cardizem 30 q6     # CAD   - c/w metoprolol, lisinopril     # Hypothyroid  - c/w synthroid   - TSH 3.64     # Iron deficiency  - started on 10 days of 100mg IV iron sucrose  - c/w iron sucrose until 11/1    # Severe MAUREEN   - not on CPAP  - needs Pulm f/u outpt on d/c    # Severe bilateral knee osteoarthritis   - f/u outpatient Rheumatology    # CKD stage 3, stable  - baseline creatinine reported as 1.3  - Cr today 1.1     # Microcytic anemia:  - iron deficient  - c/w iron sucrose    # HTN   - c/w lisinopril     # Morbid obesity  - need counseling on weight loss    Activity as tolerated   Diet DASH/ TLC, fluid restriction 1L   DVT PPX Eliquis   GI ppx not indicated   Dispo from home   CODE status: FULL

## 2020-10-28 NOTE — PRE-ANESTHESIA EVALUATION ADULT - NSANTHOSAYNRD_GEN_A_CORE
No. MAUREEN screening performed.  STOP BANG Legend: 0-2 = LOW Risk; 3-4 = INTERMEDIATE Risk; 5-8 = HIGH Risk Head Injury

## 2020-10-28 NOTE — PROGRESS NOTE ADULT - ASSESSMENT
Acute systolic chf EF40-45%   neg 1000 ml in the last 24 hours   switch to po lasix 40 qday   chest xray shows decreased edema   EF 40-45% possibly tachycardia induced   on lisinopril will hold for now and resume once BP tolerates   will discuss with cardiology regarding ischemic work up after JOVAN and possible CV today     Afib RVR:   increased lopressor to 50 q6h HR 90 to 110 on tele   TSH normal   JOVAN and possible CV today     Iron deficiency anemia: on IV IRON needs a total of 1000 mg tomorrow will be last day of IV IRON      history of MAUREEN will need to follow up with pulm as OPT to get CPAP    discussed with daughter and she agrees with plan of care

## 2020-10-28 NOTE — PROGRESS NOTE ADULT - ATTENDING COMMENTS
Pre-procedure Assessment:  Patient seen and examined. I agree with the history and physical which I have reviewed and noted any changes below.  10-28-20 @ 14:29
Patient seen and examined independently. I agree with the resident's note, physical exam, and plan except as below.  Vital Signs Last 24 Hrs  T(C): 36.3 (23 Oct 2020 14:11), Max: 36.3 (23 Oct 2020 14:11)  T(F): 97.4 (23 Oct 2020 14:11), Max: 97.4 (23 Oct 2020 14:11)  HR: 79 (23 Oct 2020 14:11) (62 - 97)  BP: 92/51 (23 Oct 2020 14:11) (92/51 - 115/61)  BP(mean): --  RR: 18 (23 Oct 2020 14:11) (18 - 20)  SpO2: 98% (22 Oct 2020 20:21) (98% - 98%)  Pe  nad  aaox3  morbidly obese  [id number] irreg mildly tachy  bilat air entry +   no wheeze  softntnd+bs  +edema bilat improved    #acute on chronic diastolic CHF - likely due to rapid afib- changed to po lasix  - cont to monitor   #Afib with RVR= sp cardizem drip - changed to po cardizem , HR improved 80s to 100s - need to titrate meds for HR - but BP soft - cont to monitor  - echo:  < from: TTE Echo Complete w/o Contrast w/ Doppler (10.22.20 @ 13:07) >    Summary:   1. Left ventricular ejection fraction, by visual estimation, is 40 to 45%.   2. Mildly increased LV wall thickness.   3. Moderately enlarged left atrium.   4. Normal right atrial size.   5. Mild to moderate mitral valve regurgitation.   6. Mild tricuspid regurgitation.   7. Mild aortic regurgitation.   8. Sclerotic aortic valve with decreased opening.   9. Mild pulmonic valve regurgitation.    < end of copied text >    cardio follow up     #morbid obesity     #chronic RLE ulcer - nonpurulent - has follow up as outpt - seen by pod - arterial duplex done - pending read    SOb improved, felt dizzy this AM - check orthostatics, cont to monitor HR -  used  #[id number] .
patient seen and examined independently   agree with above note with the following additions    Acute on chronci diastolic chf still fluid overloaded:   c/w IV lasix 40 BID   chest xray shows decreased edema today   EF 40-45% possibly tachycardia induced   spoke to cardiology will need stress test when more diuresed and rate controlled   cardiology will follow up today     Iron deficiency anemia: on IV IRON for now   history of MAUREEN will need to follow up with pulm as OPT to get CPAP      Afib RVR:   increase lopressor to 50 q6h for better rate control   on cardizem 30 q6h   on eliquis  tsh 3.6

## 2020-10-28 NOTE — PROGRESS NOTE ADULT - SUBJECTIVE AND OBJECTIVE BOX
HPI  78 y/o female Norwegian speaking with hx  CHpEF,DLD, AFib on Eliquis, sever bilateral knee MAUREEN, Sever MAUREEN not on CPAP, morbid obesity, Hypothyroid presents to the ED for Shortness of breath for 2 months.    Patient is having worsening shortness of breath on exertion, lower ext swelling, Mild PND, she sleeps on 3 pillows. Denied any chest pain, palpations, N/V/D, GI or urinary Symptoms. Patient is compliant with medications and diet. she lives at home with daughter and can walk around home.   Patient follows with Dr. Berger, she was [facility] by pulmonary before for MAUREEN but she is not on CPAP.     In the ED Pt was Hd stable,,  and irregular, 10 IV Cardizem and 60 PO with improvement of HR, 250 CC IVF Pro- bnp 3300, CXR showed pulmonary congestion.     Currently admitted to medicine with the primary diagnosis of Atrial fibrillation with RVR. Today is hospital day 6.     INTERVAL HPI / OVERNIGHT EVENTS:  Patient was examined and seen at bedside. This morning she is resting comfortably in bed and reports no new issues or overnight events.     She was started on IV Lasix 60 mg BID on admission for HF exac. with A.fib & RVR with improvement, so she was transitioned to oral, when she developed significant fluid overload in her lungs on 10/25 (seen on Xray) leading to tachypnea on exertion. Switched back to IV lasix yesterday.     Started on PO Lasix 40 today.     Stress test planned for when patient is rate controlled + euvolemic (-1L as recc'd by Cardiology). Metoprolol increased to 50 q6 2 days ago; currently in 90's to 115 HR this AM.     ROS: Otherwise unremarkable     PAST MEDICAL & SURGICAL HISTORY  Abnormal cholesterol test  Acquired cataract  Acute CHF  Acquired hypothyroidism  H/O: HTN (hypertension)  Obesity, mild  History of chronic CHF    ALLERGIES  No Known Allergies    MEDICATIONS  STANDING MEDICATIONS  apixaban 5 milliGRAM(s) Oral every 12 hours  chlorhexidine 4% Liquid 1 Application(s) Topical <User Schedule>  collagenase Ointment 1 Application(s) Topical daily  furosemide    Tablet 40 milliGRAM(s) Oral daily  levothyroxine 75 MICROGram(s) Oral daily  metoprolol tartrate 50 milliGRAM(s) Oral four times a day    PRN MEDICATIONS    VITALS:  T(F): 96.7  HR: 107  BP: 123/71  RR: 18  SpO2: 99%    PHYSICAL EXAM  GEN: NAD, Resting comfortably in bed  PULM: L sided fine crackles in base, R side CLA, no wheezes/rales/rhonchi bilateral   CVS: Regular rate and rhythm, S1-S2, no murmurs  ABD: Soft, non-tender, non-distended, no guarding  EXT: No edema  NEURO: A&Ox3, no focal deficits    LABS                        9.4    7.31  )-----------( 212      ( 28 Oct 2020 06:02 )             33.8     10-28    141  |  103  |  21<H>  ----------------------------<  90  4.2   |  25  |  1.1    Ca    8.8      28 Oct 2020 06:02  Phos  3.7     10-28  Mg     2.1     10-28      RADIOLOGY    X ray Chest 10/26  Decreased perihilar opacities. Right midlung linear scarring/atelectasis. No pneumothorax. No significant pleural effusion on frontal view.    X ray Chest 10/25  Right middle lung linear scarring/atelectasis. Bilateral perihilar opacities, slightly increased. No pneumothorax or pleural effusion.      TTE Echo Complete w/o Contrast w/ Doppler 10/22   1. Left ventricular ejection fraction, by visual estimation, is 40 to 45%.   2. Mildly increased LV wall thickness.   3. Moderately enlarged left atrium.   4. Normal right atrial size.   5. Mild to moderate mitral valve regurgitation.   6. Mild tricuspid regurgitation.   7. Mild aortic regurgitation.   8. Sclerotic aortic valve with decreased opening.   9. Mild pulmonic valve regurgitation.

## 2020-10-29 LAB
ANION GAP SERPL CALC-SCNC: 11 MMOL/L — SIGNIFICANT CHANGE UP (ref 7–14)
BASOPHILS # BLD AUTO: 0.01 K/UL — SIGNIFICANT CHANGE UP (ref 0–0.2)
BASOPHILS NFR BLD AUTO: 0.1 % — SIGNIFICANT CHANGE UP (ref 0–1)
BUN SERPL-MCNC: 20 MG/DL — SIGNIFICANT CHANGE UP (ref 10–20)
CALCIUM SERPL-MCNC: 8.8 MG/DL — SIGNIFICANT CHANGE UP (ref 8.5–10.1)
CHLORIDE SERPL-SCNC: 101 MMOL/L — SIGNIFICANT CHANGE UP (ref 98–110)
CO2 SERPL-SCNC: 28 MMOL/L — SIGNIFICANT CHANGE UP (ref 17–32)
CREAT SERPL-MCNC: 1.1 MG/DL — SIGNIFICANT CHANGE UP (ref 0.7–1.5)
EOSINOPHIL # BLD AUTO: 0.15 K/UL — SIGNIFICANT CHANGE UP (ref 0–0.7)
EOSINOPHIL NFR BLD AUTO: 2.1 % — SIGNIFICANT CHANGE UP (ref 0–8)
GLUCOSE SERPL-MCNC: 100 MG/DL — HIGH (ref 70–99)
HCT VFR BLD CALC: 35.3 % — LOW (ref 37–47)
HGB BLD-MCNC: 10 G/DL — LOW (ref 12–16)
IMM GRANULOCYTES NFR BLD AUTO: 0.4 % — HIGH (ref 0.1–0.3)
LYMPHOCYTES # BLD AUTO: 1.1 K/UL — LOW (ref 1.2–3.4)
LYMPHOCYTES # BLD AUTO: 15.4 % — LOW (ref 20.5–51.1)
MAGNESIUM SERPL-MCNC: 2.1 MG/DL — SIGNIFICANT CHANGE UP (ref 1.8–2.4)
MCHC RBC-ENTMCNC: 21 PG — LOW (ref 27–31)
MCHC RBC-ENTMCNC: 28.3 G/DL — LOW (ref 32–37)
MCV RBC AUTO: 74.2 FL — LOW (ref 81–99)
MONOCYTES # BLD AUTO: 0.72 K/UL — HIGH (ref 0.1–0.6)
MONOCYTES NFR BLD AUTO: 10.1 % — HIGH (ref 1.7–9.3)
NEUTROPHILS # BLD AUTO: 5.11 K/UL — SIGNIFICANT CHANGE UP (ref 1.4–6.5)
NEUTROPHILS NFR BLD AUTO: 71.9 % — SIGNIFICANT CHANGE UP (ref 42.2–75.2)
NRBC # BLD: 0 /100 WBCS — SIGNIFICANT CHANGE UP (ref 0–0)
PHOSPHATE SERPL-MCNC: 3.7 MG/DL — SIGNIFICANT CHANGE UP (ref 2.1–4.9)
PLATELET # BLD AUTO: 238 K/UL — SIGNIFICANT CHANGE UP (ref 130–400)
POTASSIUM SERPL-MCNC: 3.9 MMOL/L — SIGNIFICANT CHANGE UP (ref 3.5–5)
POTASSIUM SERPL-SCNC: 3.9 MMOL/L — SIGNIFICANT CHANGE UP (ref 3.5–5)
RBC # BLD: 4.76 M/UL — SIGNIFICANT CHANGE UP (ref 4.2–5.4)
RBC # FLD: 21.9 % — HIGH (ref 11.5–14.5)
SODIUM SERPL-SCNC: 140 MMOL/L — SIGNIFICANT CHANGE UP (ref 135–146)
WBC # BLD: 7.12 K/UL — SIGNIFICANT CHANGE UP (ref 4.8–10.8)
WBC # FLD AUTO: 7.12 K/UL — SIGNIFICANT CHANGE UP (ref 4.8–10.8)

## 2020-10-29 PROCEDURE — 99222 1ST HOSP IP/OBS MODERATE 55: CPT

## 2020-10-29 PROCEDURE — 99233 SBSQ HOSP IP/OBS HIGH 50: CPT

## 2020-10-29 RX ORDER — METOPROLOL TARTRATE 50 MG
100 TABLET ORAL
Refills: 0 | Status: DISCONTINUED | OUTPATIENT
Start: 2020-10-29 | End: 2020-10-30

## 2020-10-29 RX ORDER — AMIODARONE HYDROCHLORIDE 400 MG/1
0.5 TABLET ORAL
Qty: 900 | Refills: 0 | Status: DISCONTINUED | OUTPATIENT
Start: 2020-10-29 | End: 2020-10-30

## 2020-10-29 RX ORDER — AMIODARONE HYDROCHLORIDE 400 MG/1
200 TABLET ORAL
Refills: 0 | Status: DISCONTINUED | OUTPATIENT
Start: 2020-10-29 | End: 2020-10-29

## 2020-10-29 RX ORDER — AMIODARONE HYDROCHLORIDE 400 MG/1
200 TABLET ORAL
Refills: 0 | Status: DISCONTINUED | OUTPATIENT
Start: 2020-10-29 | End: 2020-10-30

## 2020-10-29 RX ADMIN — Medication 100 MILLIGRAM(S): at 06:05

## 2020-10-29 RX ADMIN — Medication 100 MILLIGRAM(S): at 22:06

## 2020-10-29 RX ADMIN — Medication 75 MICROGRAM(S): at 06:04

## 2020-10-29 RX ADMIN — Medication 40 MILLIGRAM(S): at 06:04

## 2020-10-29 RX ADMIN — APIXABAN 5 MILLIGRAM(S): 2.5 TABLET, FILM COATED ORAL at 17:40

## 2020-10-29 RX ADMIN — AMIODARONE HYDROCHLORIDE 16.7 MG/MIN: 400 TABLET ORAL at 14:54

## 2020-10-29 RX ADMIN — CHLORHEXIDINE GLUCONATE 1 APPLICATION(S): 213 SOLUTION TOPICAL at 06:06

## 2020-10-29 RX ADMIN — IRON SUCROSE 210 MILLIGRAM(S): 20 INJECTION, SOLUTION INTRAVENOUS at 22:06

## 2020-10-29 RX ADMIN — Medication 50 MILLIGRAM(S): at 06:04

## 2020-10-29 RX ADMIN — Medication 1 APPLICATION(S): at 06:04

## 2020-10-29 RX ADMIN — AMIODARONE HYDROCHLORIDE 16.7 MG/MIN: 400 TABLET ORAL at 00:03

## 2020-10-29 RX ADMIN — APIXABAN 5 MILLIGRAM(S): 2.5 TABLET, FILM COATED ORAL at 06:04

## 2020-10-29 RX ADMIN — AMIODARONE HYDROCHLORIDE 200 MILLIGRAM(S): 400 TABLET ORAL at 17:39

## 2020-10-29 RX ADMIN — Medication 50 MILLIGRAM(S): at 11:31

## 2020-10-29 RX ADMIN — Medication 100 MILLIGRAM(S): at 17:40

## 2020-10-29 RX ADMIN — Medication 100 MILLIGRAM(S): at 13:31

## 2020-10-29 NOTE — PROGRESS NOTE ADULT - SUBJECTIVE AND OBJECTIVE BOX
no chest pain   no sob at rest   failed CV yesterday started on IV amio     ROS 10 point ROS otherwise neg    Vital Signs Last 24 Hrs  T(C): 36.6 (29 Oct 2020 09:58), Max: 36.9 (29 Oct 2020 08:00)  T(F): 97.9 (29 Oct 2020 09:58), Max: 98.5 (29 Oct 2020 08:00)  HR: 106 (29 Oct 2020 09:58) (78 - 118)  BP: 111/72 (29 Oct 2020 09:58) (90/52 - 143/59)  BP(mean): --  RR: 18 (29 Oct 2020 09:58) (18 - 20)  SpO2: 98% (29 Oct 2020 08:16) (98% - 100%)    PHYSICAL EXAM:  GENERAL: NAD,  Pulm: Clear to auscultation bilaterally; No wheeze  CV: Regular rate and rhythm; No murmurs, rubs, or gallops  GI: Soft, Nontender, Nondistended; Bowel sounds present  EXTREMITIES:  2+ Peripheral Pulses, No clubbing, cyanosis, or edema  PSYCH: AAOx3  NEUROLOGY: non-focal  SKIN: No rashes or lesions                          10.0   7.12  )-----------( 238      ( 29 Oct 2020 06:43 )             35.3     10-29    140  |  101  |  20  ----------------------------<  100<H>  3.9   |  28  |  1.1    Ca    8.8      29 Oct 2020 06:43  Phos  3.7     10-29  Mg     2.1     10-29      MEDICATIONS  (STANDING):  aMIOdarone    Tablet 200 milliGRAM(s) Oral two times a day  aMIOdarone Infusion 1 mG/Min (33.3 mL/Hr) IV Continuous <Continuous>  aMIOdarone Infusion 0.5 mG/Min (16.7 mL/Hr) IV Continuous <Continuous>  apixaban 5 milliGRAM(s) Oral every 12 hours  benzonatate 100 milliGRAM(s) Oral three times a day  chlorhexidine 4% Liquid 1 Application(s) Topical <User Schedule>  collagenase Ointment 1 Application(s) Topical daily  furosemide    Tablet 40 milliGRAM(s) Oral daily  iron sucrose IVPB 100 milliGRAM(s) IV Intermittent once  levothyroxine 75 MICROGram(s) Oral daily  metoprolol tartrate 50 milliGRAM(s) Oral four times a day    MEDICATIONS  (PRN):           no chest pain   no sob at rest   failed CV yesterday started on IV amio     ROS 10 point ROS otherwise neg    Vital Signs Last 24 Hrs  T(C): 36.6 (29 Oct 2020 09:58), Max: 36.9 (29 Oct 2020 08:00)  T(F): 97.9 (29 Oct 2020 09:58), Max: 98.5 (29 Oct 2020 08:00)  HR: 106 (29 Oct 2020 09:58) (78 - 118)  BP: 111/72 (29 Oct 2020 09:58) (90/52 - 143/59)  BP(mean): --  RR: 18 (29 Oct 2020 09:58) (18 - 20)  SpO2: 98% (29 Oct 2020 08:16) (98% - 100%)    PHYSICAL EXAM:  GENERAL: NAD,  Pulm: Clear to auscultation bilaterally; No wheeze  CV: IRRegular rate and rhythm;   GI: Soft, Nontender, Nondistended; Bowel sounds present  EXTREMITIES:  2+ Peripheral Pulses, No clubbing, cyanosis, or edema  PSYCH: AAOx3  NEUROLOGY: non-focal  SKIN: No rashes or lesions                          10.0   7.12  )-----------( 238      ( 29 Oct 2020 06:43 )             35.3     10-29    140  |  101  |  20  ----------------------------<  100<H>  3.9   |  28  |  1.1    Ca    8.8      29 Oct 2020 06:43  Phos  3.7     10-29  Mg     2.1     10-29      MEDICATIONS  (STANDING):  aMIOdarone    Tablet 200 milliGRAM(s) Oral two times a day  aMIOdarone Infusion 1 mG/Min (33.3 mL/Hr) IV Continuous <Continuous>  aMIOdarone Infusion 0.5 mG/Min (16.7 mL/Hr) IV Continuous <Continuous>  apixaban 5 milliGRAM(s) Oral every 12 hours  benzonatate 100 milliGRAM(s) Oral three times a day  chlorhexidine 4% Liquid 1 Application(s) Topical <User Schedule>  collagenase Ointment 1 Application(s) Topical daily  furosemide    Tablet 40 milliGRAM(s) Oral daily  iron sucrose IVPB 100 milliGRAM(s) IV Intermittent once  levothyroxine 75 MICROGram(s) Oral daily  metoprolol tartrate 50 milliGRAM(s) Oral four times a day    MEDICATIONS  (PRN):

## 2020-10-29 NOTE — PROGRESS NOTE ADULT - SUBJECTIVE AND OBJECTIVE BOX
HPI:  80 y/o female Israeli speaking with hx of CHpEF,DLD, AFib on Eliquis, sever bilateral knee MAUREEN, Sever MAUREEN not on CPAP, morbid obesity,  Hypothyroid presents to the ED for Shortness of breath for the last 2 months.    Patient is having worsening shortness of breath on exertion, lower ext swelling, Mild PND, she sleeps on 3 pillows. Denied any chest pain, palpations, N/V/D, GI or urinary Symptoms. Patient is compliant with medications and diet. she lives at home with daughter and can walk around home.     Patient follows with Dr. Berger, she was eval by pulmonary before for MAUREEN but she is not on CPAP.     In ED,  and irregular, 10 IV Cardizem and 60 PO with improvement of HR, 250 CC IVF Pro-BNP 3300, CXR showed pulmonary congestion.     Currently admitted to medicine with the primary diagnosis of Atrial fibrillation with RVR. Today is hospital day 8.     INTERVAL HPI / OVERNIGHT EVENTS:  Patient was examined and seen at bedside. This morning she is resting comfortably in bed and reports no new issues or overnight events.     - Midline inserted due to IV line infiltration. Patient is s/p JOVAN cardioversion with refractory A.fib with RVR.   - Continued on Amiodarone drip until 7PM tonight transition to oral 200 BID.   - EP recc'd ablation, A/C and rate control with Beta blocker, and to avoid Cardizem.     ROS: Otherwise unremarkable     PAST MEDICAL & SURGICAL HISTORY  Abnormal cholesterol test  Acquired cataract  Acute CHF  Acquired hypothyroidism  H/O: HTN (hypertension)  Obesity, mild  History of chronic CHF      ALLERGIES  No Known Allergies    MEDICATIONS  STANDING MEDICATIONS  aMIOdarone    Tablet 200 milliGRAM(s) Oral two times a day  aMIOdarone Infusion 0.501 mG/Min IV Continuous <Continuous>  apixaban 5 milliGRAM(s) Oral every 12 hours  benzonatate 100 milliGRAM(s) Oral three times a day  chlorhexidine 4% Liquid 1 Application(s) Topical <User Schedule>  collagenase Ointment 1 Application(s) Topical daily  furosemide    Tablet 40 milliGRAM(s) Oral daily  iron sucrose IVPB 100 milliGRAM(s) IV Intermittent once  levothyroxine 75 MICROGram(s) Oral daily  metoprolol succinate  milliGRAM(s) Oral two times a day    PRN MEDICATIONS    VITALS:  T(F): 98.4  HR: 110  BP: 100/58  RR: 18  SpO2: 98%    PHYSICAL EXAM  GEN: NAD, Resting comfortably in bed  PULM: Clear to auscultation bilaterally, No wheezes  CVS: Regular rate and rhythm, S1-S2, no murmurs  ABD: Soft, non-tender, non-distended, no guarding  NEURO: A&Ox3, no focal deficits    LABS                        10.0   7.12  )-----------( 238      ( 29 Oct 2020 06:43 )             35.3     10-29    140  |  101  |  20  ----------------------------<  100<H>  3.9   |  28  |  1.1    Ca    8.8      29 Oct 2020 06:43  Phos  3.7     10-29  Mg     2.1     10-29    RADIOLOGY    X ray Chest 10/26  Decreased perihilar opacities. Right midlung linear scarring/atelectasis. No pneumothorax. No significant pleural effusion on frontal view.    X ray Chest 10/25  Right middle lung linear scarring/atelectasis. Bilateral perihilar opacities, slightly increased. No pneumothorax or pleural effusion.      TTE Echo Complete w/o Contrast w/ Doppler 10/22   1. Left ventricular ejection fraction, by visual estimation, is 40 to 45%.   2. Mildly increased LV wall thickness.   3. Moderately enlarged left atrium.   4. Normal right atrial size.   5. Mild to moderate mitral valve regurgitation.   6. Mild tricuspid regurgitation.   7. Mild aortic regurgitation.   8. Sclerotic aortic valve with decreased opening.   9. Mild pulmonic valve regurgitation.

## 2020-10-29 NOTE — PROGRESS NOTE ADULT - ASSESSMENT
ASSESSMENT  78 y/o female Faroese speaking with hx CAD, CHpEF,, AFib on Eliquis,  sever bilateral knee MAUREEN, Severe MAUREEN not on CPAP, morbid obesity,  Hypothyroid presents to the ED for Shortness of breath for the last 2 months with ECHO showing EF 40-45%, HR better controlled on Cardizem 30q6, arterial duplex to evaluate R foot/lateral leg ulcer negative for any arterial flow issues, transitioned to 40 PO Lasix as of yesterday with JOVAN + Cardioversion yesterday with refractory A.fib planned for ablation with EP transitioning to Amio PO at 7PM today.     PLAN    # Afib with RVR  - PE, CXR and ProBNP consistent with exacerbation of CHFpEF   - HR today 90s to 120 with A.fib  - JOVAN yesterday with cardioversion did not stop A.fib  - c/w Eliquis   - c/w Metoprolol 100 q6   - STOPPEd cardizem   - EP recc'd ablation  - if patient becomes tachy once transitioning from IV to PO Amio, give Beta blockers; avoid Cardizem.     # Acute on chronic Diastolic HF   - ECHO from 2019: grade diastolic 1 dysfunction from 2019, normal EF  - ECHO 10/22: EF 40-45%  - CXR 10/25 showed increased congestion; repeat XRay on 10/26 shows improvement  - c/w PO Lasix 40  - restart on Oral Lasix 40 mg tomorrow if improved  - Strict input / output   - 1200 ml Fluid restriction  - patient is asymptomatic at rest  - patient became SOB and tachycardic when ambulating yesterday  - Planned for JOVAN with cardioversion today.   - stress test when adequately diuresed as recc'd by Dr. Berger, Cardiology    # CAD   - c/w metoprolol, lisinopril     # Hypothyroid  - c/w synthroid   - TSH 3.64     # Iron deficiency  - started on 10 days of 100mg IV iron sucrose  - stop iron sucrose after today    # Severe MUAREEN   - not on CPAP  - needs Pulm f/u outpt on d/c    # Severe bilateral knee osteoarthritis   - f/u outpatient Rheumatology    # CKD stage 3, stable  - baseline creatinine reported as 1.3  - Cr today 1.1     # Microcytic anemia:  - iron deficient  - c/w iron sucrose; stop after tonight  - completed 1g as of today     # HTN   - c/w lisinopril     # Morbid obesity  - need counseling on weight loss    Activity as tolerated   Diet DASH/ TLC, fluid restriction 1L   DVT PPX Eliquis   GI ppx not indicated   Dispo from home   CODE status: FULL

## 2020-10-29 NOTE — DIETITIAN INITIAL EVALUATION ADULT. - PHYSCIAL ASSESSMENT
obese/BMI as above. Skin - rt foot venous stasis ulcer.  Last documented BM 10/27. No chewing/swallowing difficulties reported (as per 3[facility] staff) deferred at this time

## 2020-10-29 NOTE — PROGRESS NOTE ADULT - ASSESSMENT
Acute systolic chf EF40-45%   improved   po lasix 40 qday   chest xray shows decreased edema   EF 40-45% possibly tachycardia induced   lisinopril on hold for now and resume once BP tolerates   ischemic work up when HR better controlled     Afib RVR:   failed CV on 10/28/20   started on IV amiodarone will end 6 pm tonight and will transition to po amiodarone 200 BID   change lopressor 50 q6h to toprol 100 BID   TSH normal     Iron deficiency anemia: today patient will complete 1000 mg of IV IRON      history of MAUREEN will need to follow up with pulm as OPT to get CPAP           Acute systolic chf EF40-45%   improved   po lasix 40 qday   chest xray shows decreased edema   EF 40-45% possibly tachycardia induced   lisinopril on hold for now and resume once BP tolerates   ischemic work up when HR better controlled     Afib RVR: still in afib 90s -110  failed CV on 10/28/20   started on IV amiodarone will end 6 pm tonight and will transition to po amiodarone 200 BID   change lopressor 50 q6h to toprol 100 BID   TSH normal     Iron deficiency anemia: today patient will complete 1000 mg of IV IRON      history of MAUREEN will need to follow up with pulm as OPT to get CPAP    discussed with daughter Quynh over the phone and she agrees with plan of care

## 2020-10-29 NOTE — DIETITIAN INITIAL EVALUATION ADULT. - ADD RECOMMEND
continue current diet. There is no active nutrition dx at this time however RD to f/u in 4 days in order to obtain full  nutrition hx and reassess if pt is at nutrition risk. continue current diet. There is no active nutrition dx at this time however RD to f/u in 4 days in order to obtain full nutrition hx and reassess pt's status.

## 2020-10-29 NOTE — CONSULT NOTE ADULT - SUBJECTIVE AND OBJECTIVE BOX
Date of Admission: 10/21    CHIEF COMPLAINT: sob    HISTORY OF PRESENT ILLNESS:     79 y.o female patient with PMH of HpEF, DLD, AFib on Eliquis, OA, MAUREEN not on CPAP, morbid obesity, hypothyroidism presents to the ED for shortness of breath for the last month.  History goes back to a month prior to presentation when then patient starting having progressively worsening shortness of breath on exertion, lower ext swelling, PND, and orthopnea. This shortness of breath was associated with palpitations. She denies any chest pain, palpations, N/V/D, GI or urinary Symptoms. Patient reports being compliant with medications and diet.    In the ED Pt was Hd stable,,  and irregular, 10 IV Cardizem and 60 PO with improvement of HR, 250 CC IVF Pro- bnp 3300, CXR showed pulmonary congestion.  ,  (21 Oct 2020 19:38)    EPS HPI:  Pt. Angolan speaking, son-in-law at bedside. Son-in-law insisting for translation. Pt. denies any active chest pain, sob, or palpitations. Pt. underwent JOVAN/CV yesterday but failed to convert to NSR.    PAST MEDICAL & SURGICAL HISTORY:  Abnormal cholesterol test    Acquired cataract    Acute CHF    Acquired hypothyroidism    H/O: HTN (hypertension)    Obesity, mild    History of chronic CHF        FAMILY HISTORY:  [x ] no pertinent family history of premature cardiovascular disease in first degree relatives.  Mother:   Father:   Siblings:     SOCIAL HISTORY:    [x ] Non-smoker  [ ] Smoker  [ ] Alcohol    Allergies    No Known Allergies    Intolerances    	    REVIEW OF SYSTEMS:  CONSTITUTIONAL: No fever, weight loss, or fatigue  CARDIOLOGY: denies chest pain, shortness of breath or syncopal episodes.   RESPIRATORY: denies shortness of breath, wheezing.   NEUROLOGICAL: No weakness, no focal deficits to report.  ENDOCRINOLOGICAL: no recent change in diabetic medications.   GI: no BRBPR, no N,V, diarrhea.    PSYCHIATRY: normal mood and affect  HEENT: no nasal discharge, no ecchymosis  SKIN: no ecchymosis, no breakdown  MUSCULOSKELETAL: Full range of motion x4.     PHYSICAL EXAM:  T(C): 36.9 (10-29-20 @ 14:02), Max: 36.9 (10-29-20 @ 08:00)  HR: 110 (10-29-20 @ 14:02) (78 - 118)  BP: 100/58 (10-29-20 @ 14:02) (90/52 - 143/59)  RR: 18 (10-29-20 @ 14:02) (18 - 20)  SpO2: 98% (10-29-20 @ 08:16) (98% - 100%)  Wt(kg): --  I&O's Summary    28 Oct 2020 07:01  -  29 Oct 2020 07:00  --------------------------------------------------------  IN: 240 mL / OUT: 1425 mL / NET: -1185 mL    29 Oct 2020 07:01  -  29 Oct 2020 15:44  --------------------------------------------------------  IN: 0 mL / OUT: 600 mL / NET: -600 mL        General Appearance: well appearing, normal for age and gender. 	  Neck: normal JVP, no bruit.   Eyes: No xanthelasma, Extra ocular muscles intact.   Cardiovascular: regular rate and rhythm S1 S2, No JVD, No murmurs, 2+ b/l LE edema  Respiratory: Lungs clear to auscultation	  Psychiatry: Alert and oriented x 3, Mood & affect appropriate  Gastrointestinal:  Soft, Non-tender  Skin/Integument: No rashes, No ecchymosis, No cyanosis	  Neurologic: Non-focal  Musculoskeletal/ extremities: Normal range of motion, No clubbing, cyanosis  Vascular: Peripheral pulses palpable 2+ bilaterally    LABS:	 	                          10.0   7.12  )-----------( 238      ( 29 Oct 2020 06:43 )             35.3     10-29    140  |  101  |  20  ----------------------------<  100<H>  3.9   |  28  |  1.1    Ca    8.8      29 Oct 2020 06:43  Phos  3.7     10-29  Mg     2.1     10-29      TELEMETRY EVENTS: 	    A.Fib with RVR    ECG:  	  A.Fib, RSR', poor RWP    RADIOLOGY:  CXR:   Xray Chest 1 View-PORTABLE IMMEDIATE (Xray Chest 1 View-PORTABLE IMMEDIATE .) (10.26.20 @ 10:19) >  Decreased perihilar opacities. Right midlung linear scarring/atelectasis. No pneumothorax. No significant pleural effusion on frontal view.    < end of copied text >    PREVIOUS DIAGNOSTIC TESTING:    [ ] Echocardiogram:    < from: TTE Echo Complete w/o Contrast w/ Doppler (10.22.20 @ 13:07) >  1. Left ventricular ejection fraction, by visual estimation, is 40 to 45%.   2. Mildly increased LV wall thickness.   3. Moderately enlarged left atrium.   4. Normal right atrial size.   5. Mild to moderate mitral valve regurgitation.   6. Mild tricuspid regurgitation.   7. Mild aortic regurgitation.   8. Sclerotic aortic valve with decreased opening.   9. Mild pulmonic valve regurgitat      < from: Transthoracic Echocardiogram (07.17.19 @ 09:04) >  Summary:   1. Left ventricular ejection fraction, by visual estimation, is 55 to   60%.   2. Spectral Doppler shows impaired relaxation pattern of left   ventricular myocardial filling (Grade I diastolic dysfunction).   3. Mild aortic regurgitation.   4. Sclerotic aortic valve with decreased opening.  [ ]  Catheterization:      [ ] Stress Test:  	  	    Home Medications:  Eliquis 5 mg oral tablet: 1 tab(s) orally 2 times a day (21 Oct 2020 20:27)  Lasix 40 mg oral tablet: 1 tab(s) orally 2 times a day (21 Oct 2020 20:27)  lisinopril 5 mg oral tablet: 1 tab(s) orally once a day (21 Oct 2020 20:27)  Synthroid 75 mcg (0.075 mg) oral tablet: 1 tab(s) orally once a day (21 Oct 2020 20:27)    MEDICATIONS  (STANDING):  aMIOdarone    Tablet 200 milliGRAM(s) Oral two times a day  aMIOdarone Infusion 0.501 mG/Min (16.7 mL/Hr) IV Continuous <Continuous>  apixaban 5 milliGRAM(s) Oral every 12 hours  benzonatate 100 milliGRAM(s) Oral three times a day  chlorhexidine 4% Liquid 1 Application(s) Topical <User Schedule>  collagenase Ointment 1 Application(s) Topical daily  furosemide    Tablet 40 milliGRAM(s) Oral daily  iron sucrose IVPB 100 milliGRAM(s) IV Intermittent once  levothyroxine 75 MICROGram(s) Oral daily  metoprolol succinate  milliGRAM(s) Oral two times a day    MEDICATIONS  (PRN):

## 2020-10-29 NOTE — DIETITIAN INITIAL EVALUATION ADULT. - ORAL INTAKE PTA/DIET HISTORY
INCOMPLETE NOTE RD unable to obtain nutrition hx at this time (tried to see pt twice today). Unable to reach emergency contact (pt's daughter Quynh @ [phone]). No recent inpatient [facility] admissions noted. NKA per EMR.

## 2020-10-29 NOTE — DIETITIAN INITIAL EVALUATION ADULT. - OTHER CALCULATIONS
Estimated energy needs:  Estimated protein needs:    Estimated fluid needs: 1200ml/day as per LIP Estimated energy needs: ~ 1475-1770kcal/day (25-30kcal/kg IBW) compared to 1875kcal/day (MSJ x 1.0) - BMI > 40 considered   Estimated protein needs: 65-77gm/day (1.1-1.3gm/kg IBW = ~ 0.5-0.6gm/kg act wt) - h/o CKD and morbid obesity considered.    Estimated fluid needs: 1200ml/day as per LIP

## 2020-10-29 NOTE — CONSULT NOTE ADULT - ASSESSMENT
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

## 2020-10-29 NOTE — DIETITIAN INITIAL EVALUATION ADULT. - OTHER INFO
Pt p/w shortness of breath x 2 months. As per MD acute on chronic diastolic HF. Pt is on Lasix and 1200 ml Fluid restriction. Afib with RVR. s/p JOVAN with cardioversion yesterday. Severe MAUREEN, not on CPAP. CKD stage 3, stable per MD.

## 2020-10-30 VITALS — HEART RATE: 85 BPM | RESPIRATION RATE: 18 BRPM | SYSTOLIC BLOOD PRESSURE: 115 MMHG | DIASTOLIC BLOOD PRESSURE: 64 MMHG

## 2020-10-30 LAB
ANION GAP SERPL CALC-SCNC: 11 MMOL/L — SIGNIFICANT CHANGE UP (ref 7–14)
BASOPHILS # BLD AUTO: 0.01 K/UL — SIGNIFICANT CHANGE UP (ref 0–0.2)
BASOPHILS NFR BLD AUTO: 0.1 % — SIGNIFICANT CHANGE UP (ref 0–1)
BUN SERPL-MCNC: 17 MG/DL — SIGNIFICANT CHANGE UP (ref 10–20)
CALCIUM SERPL-MCNC: 8.6 MG/DL — SIGNIFICANT CHANGE UP (ref 8.5–10.1)
CHLORIDE SERPL-SCNC: 102 MMOL/L — SIGNIFICANT CHANGE UP (ref 98–110)
CO2 SERPL-SCNC: 27 MMOL/L — SIGNIFICANT CHANGE UP (ref 17–32)
CREAT SERPL-MCNC: 1 MG/DL — SIGNIFICANT CHANGE UP (ref 0.7–1.5)
EOSINOPHIL # BLD AUTO: 0.15 K/UL — SIGNIFICANT CHANGE UP (ref 0–0.7)
EOSINOPHIL NFR BLD AUTO: 2.1 % — SIGNIFICANT CHANGE UP (ref 0–8)
GLUCOSE SERPL-MCNC: 98 MG/DL — SIGNIFICANT CHANGE UP (ref 70–99)
HCT VFR BLD CALC: 33.9 % — LOW (ref 37–47)
HGB BLD-MCNC: 9.7 G/DL — LOW (ref 12–16)
IMM GRANULOCYTES NFR BLD AUTO: 0.7 % — HIGH (ref 0.1–0.3)
LYMPHOCYTES # BLD AUTO: 1.63 K/UL — SIGNIFICANT CHANGE UP (ref 1.2–3.4)
LYMPHOCYTES # BLD AUTO: 22.4 % — SIGNIFICANT CHANGE UP (ref 20.5–51.1)
MAGNESIUM SERPL-MCNC: 2.1 MG/DL — SIGNIFICANT CHANGE UP (ref 1.8–2.4)
MCHC RBC-ENTMCNC: 21.2 PG — LOW (ref 27–31)
MCHC RBC-ENTMCNC: 28.6 G/DL — LOW (ref 32–37)
MCV RBC AUTO: 74.2 FL — LOW (ref 81–99)
MONOCYTES # BLD AUTO: 0.72 K/UL — HIGH (ref 0.1–0.6)
MONOCYTES NFR BLD AUTO: 9.9 % — HIGH (ref 1.7–9.3)
NEUTROPHILS # BLD AUTO: 4.71 K/UL — SIGNIFICANT CHANGE UP (ref 1.4–6.5)
NEUTROPHILS NFR BLD AUTO: 64.8 % — SIGNIFICANT CHANGE UP (ref 42.2–75.2)
NRBC # BLD: 0 /100 WBCS — SIGNIFICANT CHANGE UP (ref 0–0)
PHOSPHATE SERPL-MCNC: 3.3 MG/DL — SIGNIFICANT CHANGE UP (ref 2.1–4.9)
PLATELET # BLD AUTO: 242 K/UL — SIGNIFICANT CHANGE UP (ref 130–400)
POTASSIUM SERPL-MCNC: 3.9 MMOL/L — SIGNIFICANT CHANGE UP (ref 3.5–5)
POTASSIUM SERPL-SCNC: 3.9 MMOL/L — SIGNIFICANT CHANGE UP (ref 3.5–5)
RBC # BLD: 4.57 M/UL — SIGNIFICANT CHANGE UP (ref 4.2–5.4)
RBC # FLD: 22.4 % — HIGH (ref 11.5–14.5)
SODIUM SERPL-SCNC: 140 MMOL/L — SIGNIFICANT CHANGE UP (ref 135–146)
WBC # BLD: 7.27 K/UL — SIGNIFICANT CHANGE UP (ref 4.8–10.8)
WBC # FLD AUTO: 7.27 K/UL — SIGNIFICANT CHANGE UP (ref 4.8–10.8)

## 2020-10-30 PROCEDURE — 99239 HOSP IP/OBS DSCHRG MGMT >30: CPT

## 2020-10-30 RX ORDER — AMIODARONE HYDROCHLORIDE 400 MG/1
1 TABLET ORAL
Qty: 0 | Refills: 0 | DISCHARGE
Start: 2020-10-30

## 2020-10-30 RX ORDER — METOPROLOL TARTRATE 50 MG
1 TABLET ORAL
Qty: 60 | Refills: 0
Start: 2020-10-30 | End: 2020-11-28

## 2020-10-30 RX ORDER — COLLAGENASE CLOSTRIDIUM HIST. 250 UNIT/G
1 OINTMENT (GRAM) TOPICAL
Qty: 10 | Refills: 0
Start: 2020-10-30 | End: 2020-11-28

## 2020-10-30 RX ORDER — FUROSEMIDE 40 MG
1 TABLET ORAL
Qty: 30 | Refills: 0
Start: 2020-10-30 | End: 2020-11-28

## 2020-10-30 RX ORDER — FUROSEMIDE 40 MG
1 TABLET ORAL
Qty: 0 | Refills: 0 | DISCHARGE

## 2020-10-30 RX ORDER — APIXABAN 2.5 MG/1
1 TABLET, FILM COATED ORAL
Qty: 60 | Refills: 0
Start: 2020-10-30 | End: 2020-11-28

## 2020-10-30 RX ORDER — LEVOTHYROXINE SODIUM 125 MCG
1 TABLET ORAL
Qty: 30 | Refills: 0
Start: 2020-10-30 | End: 2020-11-28

## 2020-10-30 RX ORDER — APIXABAN 2.5 MG/1
1 TABLET, FILM COATED ORAL
Qty: 0 | Refills: 0 | DISCHARGE

## 2020-10-30 RX ORDER — AMIODARONE HYDROCHLORIDE 400 MG/1
1 TABLET ORAL
Qty: 14 | Refills: 0
Start: 2020-10-30 | End: 2020-11-05

## 2020-10-30 RX ORDER — METOPROLOL TARTRATE 50 MG
1 TABLET ORAL
Qty: 0 | Refills: 0 | DISCHARGE
Start: 2020-10-30

## 2020-10-30 RX ORDER — LISINOPRIL 2.5 MG/1
1 TABLET ORAL
Qty: 0 | Refills: 0 | DISCHARGE

## 2020-10-30 RX ORDER — AMIODARONE HYDROCHLORIDE 400 MG/1
1 TABLET ORAL
Qty: 30 | Refills: 0
Start: 2020-10-30 | End: 2020-11-28

## 2020-10-30 RX ORDER — LEVOTHYROXINE SODIUM 125 MCG
1 TABLET ORAL
Qty: 0 | Refills: 0 | DISCHARGE

## 2020-10-30 RX ORDER — FUROSEMIDE 40 MG
1 TABLET ORAL
Qty: 60 | Refills: 0
Start: 2020-10-30 | End: 2020-11-28

## 2020-10-30 RX ADMIN — APIXABAN 5 MILLIGRAM(S): 2.5 TABLET, FILM COATED ORAL at 17:06

## 2020-10-30 RX ADMIN — Medication 100 MILLIGRAM(S): at 06:04

## 2020-10-30 RX ADMIN — Medication 100 MILLIGRAM(S): at 06:05

## 2020-10-30 RX ADMIN — Medication 100 MILLIGRAM(S): at 13:14

## 2020-10-30 RX ADMIN — Medication 1 APPLICATION(S): at 06:07

## 2020-10-30 RX ADMIN — CHLORHEXIDINE GLUCONATE 1 APPLICATION(S): 213 SOLUTION TOPICAL at 06:04

## 2020-10-30 RX ADMIN — AMIODARONE HYDROCHLORIDE 200 MILLIGRAM(S): 400 TABLET ORAL at 06:04

## 2020-10-30 RX ADMIN — APIXABAN 5 MILLIGRAM(S): 2.5 TABLET, FILM COATED ORAL at 06:04

## 2020-10-30 RX ADMIN — AMIODARONE HYDROCHLORIDE 200 MILLIGRAM(S): 400 TABLET ORAL at 17:06

## 2020-10-30 RX ADMIN — Medication 75 MICROGRAM(S): at 06:05

## 2020-10-30 RX ADMIN — Medication 40 MILLIGRAM(S): at 06:05

## 2020-10-30 RX ADMIN — Medication 100 MILLIGRAM(S): at 17:07

## 2020-10-30 NOTE — PROGRESS NOTE ADULT - REASON FOR ADMISSION
SOB
shortness of breath

## 2020-10-30 NOTE — PROGRESS NOTE ADULT - SUBJECTIVE AND OBJECTIVE BOX
patient has no chest pain   no sob   patient walked and her HR went up 120-130 asymptomatic   at rest HR     Vital Signs Last 24 Hrs  T(C): 37.6 (30 Oct 2020 04:58), Max: 37.6 (30 Oct 2020 04:58)  T(F): 99.7 (30 Oct 2020 04:58), Max: 99.7 (30 Oct 2020 04:58)  HR: 111 (30 Oct 2020 04:58) (77 - 111)  BP: 109/69 (30 Oct 2020 04:58) (97/52 - 121/70)  BP(mean): --  RR: 19 (30 Oct 2020 04:58) (18 - 19)  SpO2: --    PHYSICAL EXAM:  GENERAL: NAD,  CHEST/LUNG: Clear to auscultation bilaterally; No wheeze  HEART: IRRegular rate and rhythm;   GI: Soft, Nontender, Nondistended; Bowel sounds present  EXTREMITIES:  2+ Peripheral Pulses, No clubbing, cyanosis, or edema  PSYCH: AAOx3  NEUROLOGY: non-focal  SKIN: No rashes or lesions                          9.7    7.27  )-----------( 242      ( 30 Oct 2020 05:49 )             33.9     10-30    140  |  102  |  17  ----------------------------<  98  3.9   |  27  |  1.0    Ca    8.6      30 Oct 2020 05:49  Phos  3.3     10-30  Mg     2.1     10-30      MEDICATIONS  (STANDING):  aMIOdarone    Tablet 200 milliGRAM(s) Oral two times a day  aMIOdarone Infusion 0.501 mG/Min (16.7 mL/Hr) IV Continuous <Continuous>  apixaban 5 milliGRAM(s) Oral every 12 hours  benzonatate 100 milliGRAM(s) Oral three times a day  chlorhexidine 4% Liquid 1 Application(s) Topical <User Schedule>  collagenase Ointment 1 Application(s) Topical daily  furosemide    Tablet 40 milliGRAM(s) Oral daily  levothyroxine 75 MICROGram(s) Oral daily  metoprolol succinate  milliGRAM(s) Oral two times a day    MEDICATIONS  (PRN):

## 2020-10-30 NOTE — PROGRESS NOTE ADULT - ASSESSMENT
ASSESSMENT  80 y/o female Jamaican speaking with hx CAD, CHpEF,, AFib on Eliquis,  sever bilateral knee MAUREEN, Severe MAUREEN not on CPAP, morbid obesity,  Hypothyroid presents to the ED for Shortness of breath for the last 2 months with ECHO showing EF 40-45%, HR better controlled on Cardizem 30q6, arterial duplex to evaluate R foot/lateral leg ulcer negative for any arterial flow issues, transitioned to 40 PO Lasix 2d ago, JOVAN + Cardioversion 2d ago with refractory A.fib now on Amiodarone PO pending EP/Cardio recc's.     PLAN    # Afib with RVR  - PE, CXR and ProBNP consistent with exacerbation of CHFpEF   - HR today 90s to 120 with A.fib  - JOVAN yesterday with cardioversion did not stop A.fib  - c/w Eliquis   - c/w Metoprolol 100 q6   - STOPPEd cardizem   - c/w Amiodarone; still not rate controlled/still in A.fib to 115.   - f/u EP/Cardio reccs in re refractory A fib.     # Acute on chronic Diastolic HF   - ECHO from 2019: grade diastolic 1 dysfunction from 2019, normal EF  - ECHO 10/22: EF 40-45%  - CXR 10/25 showed increased congestion; repeat XRay on 10/26 shows improvement  - c/w PO Lasix 40  - restart on Oral Lasix 40 mg tomorrow if improved  - Strict input / output   - 1200 ml Fluid restriction  - patient is asymptomatic at rest  - stress test when adequately diuresed as recc'd by Dr. Berger, Cardiology  - f/u ambulatory HR/pulse Ox today     # CAD   - c/w metoprolol, lisinopril     # Hypothyroid  - c/w synthroid   - TSH 3.64     # Iron deficiency  - started on 10 days of 100mg IV iron sucrose  - stop iron sucrose after today    # Severe MAUREEN   - not on CPAP  - needs Pulm f/u outpt on d/c    # Severe bilateral knee osteoarthritis   - f/u outpatient Rheumatology    # CKD stage 3, stable  - baseline creatinine reported as 1.3  - Cr today 1.1     # Microcytic anemia:  - iron deficient  - c/w iron sucrose; stop after tonight  - completed 1g as of today     # HTN   - c/w lisinopril     # Morbid obesity  - need counseling on weight loss    Activity as tolerated   Diet DASH/ TLC, fluid restriction 1L   DVT PPX Eliquis   GI ppx not indicated   Dispo from home   CODE status: FULL

## 2020-10-30 NOTE — PROGRESS NOTE ADULT - ASSESSMENT
Acute systolic chf EF40-45%   improved   po lasix 40 qday   chest xray shows decreased edema   EF 40-45% possibly tachycardia induced   lisinopril on hold for now and resume once BP tolerates   cardiology to comment on ischemic work up     Afib RVR: still in afib 90s -110 and up to 130 when ambulates   failed CV on 10/28/20   seen by EP recommended OPT follow up up   s/p IV amiodarone and now on po amiodarone 200 BID for one week then 200 qday   toprol 100 BID   TSH normal     Iron deficiency anemia: today patient will complete 1000 mg of IV IRON      history of MAUREEN will need to follow up with pulm as OPT to get CPAP    #Progress Note Handoff  Pending (specify): cardiology follow up and rate to better controlled   Family discussion: daughter   Disposition: Home Acute systolic chf EF40-45%   improved   po lasix 40 qday   chest xray shows decreased edema   EF 40-45% possibly tachycardia induced on JOVAN per cardiology EF was better   lisinopril on hold for now BP soft resume as OPT if BP allows   spoke to Dr Berger and no need for ischemic work up she will repeat echo as OPT in a few weeks and then to decide on ischemic work up     Afib RVR: still in afib 90s -110 asymptomatic   failed CV on 10/28/20   seen by EP recommended OPT follow up up   s/p IV amiodarone and now on po amiodarone 200 BID for one week then 200 qday   toprol 100 BID   TSH normal     Iron deficiency anemia:  completed 1000 mg of IV IRON      history of MAUREEN will need to follow up with pulm as OPT to get CPAP    #discharge today pending cardiology follow up   spent 33 min on discharge Acute systolic chf EF40-45%   improved   po lasix 40 qday   chest xray shows decreased edema   EF 40-45% possibly tachycardia induced on JOVAN per cardiology EF was better   lisinopril on hold for now BP soft resume as OPT if BP allows   spoke to Dr Berger and no need for ischemic work up she will repeat echo as OPT in a few weeks and then to decide on ischemic work up     Afib RVR: still in afib 90s -110 asymptomatic   failed CV on 10/28/20   seen by EP recommended OPT follow up up   s/p IV amiodarone and now on po amiodarone 200 BID for one week then 200 qday   toprol 100 BID   TSH normal     Iron deficiency anemia:  completed 1000 mg of IV IRON      history of MAUREEN will need to follow up with pulm as OPT to get CPAP    #discharge today. spoke Dr Berger from cardiology and cleared for discharge and to follow up in 2 weeks   spent 33 min on discharge

## 2020-10-30 NOTE — PROGRESS NOTE ADULT - SUBJECTIVE AND OBJECTIVE BOX
HPI:  78 y/o female [place] speaking with hx of CHpEF,DLD, AFib on Eliquis, sever bilateral knee MAUREEN, Sever MAUREEN not on CPAP, morbid obesity,  Hypothyroid presents to the ED for Shortness of breath for the last 2 months.    Patient is having worsening shortness of breath on exertion, lower ext swelling, Mild PND, she sleeps on 3 pillows. Denied any chest pain, palpations, N/V/D, GI or urinary Symptoms. Patient is compliant with medications and diet. she lives at home with daughter and can walk around home.     Patient follows with Dr. Berger, she was eval by pulmonary before for MAUREEN but she is not on CPAP.     In ED,  and irregular, 10 IV Cardizem and 60 PO with improvement of HR, 250 CC IVF Pro-BNP 3300, CXR showed pulmonary congestion.     Currently admitted to medicine with the primary diagnosis of Atrial fibrillation with RVR. Today is hospital day 9.    INTERVAL HPI / OVERNIGHT EVENTS:  Patient was examined and seen at bedside. This morning she is resting comfortably in bed and reports no new issues or overnight events.     - s/p JOVAN cardioversion with refractory A.fib with RVR.   - Transitioned to Amiodarone PO from drip yesterday;   - c/w BID until 11/6, then start once daily   - f/u EP recc's in re possible ablation    ROS: Otherwise unremarkable     PAST MEDICAL & SURGICAL HISTORY  Abnormal cholesterol test  Acquired cataract  Acute CHF  Acquired hypothyroidism  H/O: HTN (hypertension)  Obesity  History of chronic CHF      ALLERGIES  No Known Allergies    MEDICATIONS  STANDING MEDICATIONS  aMIOdarone    Tablet 200 milliGRAM(s) Oral two times a day  aMIOdarone Infusion 0.501 mG/Min IV Continuous <Continuous>  apixaban 5 milliGRAM(s) Oral every 12 hours  benzonatate 100 milliGRAM(s) Oral three times a day  chlorhexidine 4% Liquid 1 Application(s) Topical <User Schedule>  collagenase Ointment 1 Application(s) Topical daily  furosemide    Tablet 40 milliGRAM(s) Oral daily  levothyroxine 75 MICROGram(s) Oral daily  metoprolol succinate  milliGRAM(s) Oral two times a day    PRN MEDICATIONS    VITALS:  T(F): 99.7  HR: 111  BP: 109/69  RR: 19  SpO2: --    PHYSICAL EXAM  GEN: NAD, Resting comfortably in bed  PULM: Clear to auscultation bilaterally, No wheezes  CVS: Regular rate and rhythm, S1-S2, no murmurs  ABD: Soft, non-tender, non-distended, no guarding  NEURO: A&Ox3, no focal deficits    LABS                        9.7    7.27  )-----------( 242      ( 30 Oct 2020 05:49 )             33.9     10-30    140  |  102  |  17  ----------------------------<  98  3.9   |  27  |  1.0    Ca    8.6      30 Oct 2020 05:49  Phos  3.3     10-30  Mg     2.1     10-30      RADIOLOGY    X ray Chest 10/26  Decreased perihilar opacities. Right midlung linear scarring/atelectasis. No pneumothorax. No significant pleural effusion on frontal view.    X ray Chest 10/25  Right middle lung linear scarring/atelectasis. Bilateral perihilar opacities, slightly increased. No pneumothorax or pleural effusion.      TTE Echo Complete w/o Contrast w/ Doppler 10/22   1. Left ventricular ejection fraction, by visual estimation, is 40 to 45%.   2. Mildly increased LV wall thickness.   3. Moderately enlarged left atrium.   4. Normal right atrial size.   5. Mild to moderate mitral valve regurgitation.   6. Mild tricuspid regurgitation.   7. Mild aortic regurgitation.   8. Sclerotic aortic valve with decreased opening.   9. Mild pulmonic valve regurgitation.

## 2020-11-01 LAB
FERRITIN SERPL-MCNC: 8 NG/ML
IRON SATN MFR SERPL: 4 %
IRON SERPL-MCNC: 18 UG/DL
TIBC SERPL-MCNC: 412 UG/DL
TRANSFERRIN SERPL-MCNC: 348 MG/DL
TSH SERPL-ACNC: 5.33 UIU/ML
UIBC SERPL-MCNC: 394 UG/DL

## 2020-11-02 ENCOUNTER — APPOINTMENT (OUTPATIENT)
Dept: PODIATRY | Facility: CLINIC | Age: 80
End: 2020-11-02

## 2020-11-05 ENCOUNTER — OUTPATIENT (OUTPATIENT)
Dept: OUTPATIENT SERVICES | Facility: HOSPITAL | Age: 80
LOS: 1 days | Discharge: HOME | End: 2020-11-05

## 2020-11-05 ENCOUNTER — APPOINTMENT (OUTPATIENT)
Dept: GERIATRICS | Facility: CLINIC | Age: 80
End: 2020-11-05
Payer: MEDICAID

## 2020-11-05 VITALS
WEIGHT: 293 LBS | HEIGHT: 61 IN | SYSTOLIC BLOOD PRESSURE: 131 MMHG | BODY MASS INDEX: 55.32 KG/M2 | DIASTOLIC BLOOD PRESSURE: 90 MMHG | TEMPERATURE: 97.3 F | HEART RATE: 100 BPM

## 2020-11-05 PROCEDURE — 99214 OFFICE O/P EST MOD 30 MIN: CPT

## 2020-11-05 RX ORDER — LEVOTHYROXINE SODIUM 75 UG/1
75 TABLET ORAL
Qty: 90 | Refills: 0 | Status: DISCONTINUED | COMMUNITY
End: 2020-11-05

## 2020-11-05 RX ORDER — COLLAGENASE SANTYL 250 [ARB'U]/G
250 OINTMENT TOPICAL DAILY
Qty: 1 | Refills: 4 | Status: COMPLETED | COMMUNITY
Start: 2020-10-19 | End: 2020-11-05

## 2020-11-05 RX ORDER — LEVOTHYROXINE SODIUM 0.07 MG/1
75 TABLET ORAL
Qty: 30 | Refills: 1 | Status: COMPLETED | COMMUNITY
Start: 2020-02-29 | End: 2020-11-05

## 2020-11-05 NOTE — END OF VISIT
[] : Resident [FreeTextEntry3] : SEEN WITH GERIATRICS TEAM\par D/Jim from [facility] for a fib with RVR - since home taking no beta blockers because they were confused about how much to take - will resume metoprolol 50mg BID; is to see cardiology next tuesday.\par continue other medications as per discharge instructions.\par also renewing all supplies needed for her wound care, being coordinated by [facility]; patient is very sedentary, is overwight and daughter is concerned about her getting SOB easily with exertion; counseled her that deconditioning, obesity, recent hospitalization, medication changes and underlying dx of a fib all contribute to this; needs to lose weight and slowly get reconditioned - recommend also starting PT with [facility]; daughter will discuss and work on motivating patient.\par RTC 3 months.

## 2020-11-05 NOTE — PHYSICAL EXAM
[General Appearance - Alert] : alert [General Appearance - In No Acute Distress] : in no acute distress [Sclera] : the sclera and conjunctiva were normal [PERRL With Normal Accommodation] : pupils were equal in size, round, and reactive to light [Extraocular Movements] : extraocular movements were intact [Normal Oral Mucosa] : normal oral mucosa [No Oral Pallor] : no oral pallor [No Oral Cyanosis] : no oral cyanosis [Outer Ear] : the ears and nose were normal in appearance [Oropharynx] : The oropharynx was normal [Neck Appearance] : the appearance of the neck was normal [Neck Cervical Mass (___cm)] : no neck mass was observed [Jugular Venous Distention Increased] : there was no jugular-venous distention [Thyroid Diffuse Enlargement] : the thyroid was not enlarged [Thyroid Nodule] : there were no palpable thyroid nodules [] : no respiratory distress [Auscultation Breath Sounds / Voice Sounds] : lungs were clear to auscultation bilaterally [Heart Sounds] : normal S1 and S2 [Full Pulse] : the pedal pulses are present [Edema] : there was no peripheral edema [Bowel Sounds] : normal bowel sounds [Abdomen Soft] : soft [Abdomen Tenderness] : non-tender [FreeTextEntry1] :

## 2020-11-05 NOTE — REVIEW OF SYSTEMS
[Heart Rate Is Fast] : fast heart rate [Lower Ext Edema] : lower extremity edema [Negative] : Musculoskeletal

## 2020-11-05 NOTE — HISTORY OF PRESENT ILLNESS
[FreeTextEntry1] : This is a 80 year old female with PMHx of CHFpEF, A-fib, HTN, Hypothyroidism who is here for a F/U visit. Grand-daughter is here to provide translation.  Pt was recently admitted to the hospital 10/21-10/25 for A.Fib with RVR.  Was started on Amiodarone.  Toprol dose was increased from 25 to 100 BID.  Lisinopril has been dcd.  BP is well controlled.\par

## 2020-11-05 NOTE — ASSESSMENT
[Medication Management] : medication management [FreeTextEntry1] : This is a 80 year old female with PMHx of CHFpEF, A-fib, HTN, Hypothyroidism who is here for a F/U visit. Grand-daughter is here to provide translation.  Pt was recently admitted to the hospital 10/21-10/25 for A.Fib with RVR.  Was started on Amiodarone.  Toprol dose was increased from 25 to 100 BID.  Lisinopril has been dcd.  BP is well controlled.\par \par #Afib with RVR\par -C/w Amiodarone\par -Toprol increased from 25 to 100 BID\par \par #Hypothyroidisim\par - c/w Levothyroxine 75mcg daily\par - Repeat TSH prior to next office visit.\par \par #Microcytic Anemia\par - No complaints of bleeding or weight loss\par \par #CHFpEF\par - Continue on current meds\par - F/u with cardio for changes to meds.  Appt for 11/10/2020 scheduled\par \par #HTN\par - Continue on meds \par - Stopped Lisinopril\par \par R. Foot ulcer\par -C/w LWC\par \par Health maintenance\par - 3 Month F/u\par - Flu shot given in hospital.

## 2020-11-10 ENCOUNTER — APPOINTMENT (OUTPATIENT)
Dept: CARDIOLOGY | Facility: CLINIC | Age: 80
End: 2020-11-10

## 2020-11-11 ENCOUNTER — APPOINTMENT (OUTPATIENT)
Dept: PODIATRY | Facility: CLINIC | Age: 80
End: 2020-11-11
Payer: MEDICAID

## 2020-11-11 ENCOUNTER — APPOINTMENT (OUTPATIENT)
Dept: CARDIOLOGY | Facility: CLINIC | Age: 80
End: 2020-11-11
Payer: MEDICAID

## 2020-11-11 ENCOUNTER — OUTPATIENT (OUTPATIENT)
Dept: OUTPATIENT SERVICES | Facility: HOSPITAL | Age: 80
LOS: 1 days | Discharge: HOME | End: 2020-11-11

## 2020-11-11 VITALS
SYSTOLIC BLOOD PRESSURE: 132 MMHG | HEART RATE: 121 BPM | BODY MASS INDEX: 55.13 KG/M2 | DIASTOLIC BLOOD PRESSURE: 80 MMHG | WEIGHT: 292 LBS | TEMPERATURE: 97.2 F | HEIGHT: 61 IN

## 2020-11-11 DIAGNOSIS — L97.519 NON-PRESSURE CHRONIC ULCER OF OTHER PART OF RIGHT FOOT WITH UNSPECIFIED SEVERITY: ICD-10-CM

## 2020-11-11 DIAGNOSIS — I50.9 HEART FAILURE, UNSPECIFIED: ICD-10-CM

## 2020-11-11 DIAGNOSIS — I10 ESSENTIAL (PRIMARY) HYPERTENSION: ICD-10-CM

## 2020-11-11 DIAGNOSIS — I83.009 VARICOSE VEINS OF UNSPECIFIED LOWER EXTREMITY WITH ULCER OF UNSPECIFIED SITE: ICD-10-CM

## 2020-11-11 DIAGNOSIS — D50.9 IRON DEFICIENCY ANEMIA, UNSPECIFIED: ICD-10-CM

## 2020-11-11 DIAGNOSIS — I89.0 LYMPHEDEMA, NOT ELSEWHERE CLASSIFIED: ICD-10-CM

## 2020-11-11 DIAGNOSIS — I48.91 UNSPECIFIED ATRIAL FIBRILLATION: ICD-10-CM

## 2020-11-11 DIAGNOSIS — E78.5 HYPERLIPIDEMIA, UNSPECIFIED: ICD-10-CM

## 2020-11-11 PROCEDURE — 93000 ELECTROCARDIOGRAM COMPLETE: CPT

## 2020-11-11 PROCEDURE — 99214 OFFICE O/P EST MOD 30 MIN: CPT

## 2020-11-11 PROCEDURE — 99072 ADDL SUPL MATRL&STAF TM PHE: CPT

## 2020-11-11 PROCEDURE — 11042 DBRDMT SUBQ TIS 1ST 20SQCM/<: CPT

## 2020-11-11 RX ORDER — FUROSEMIDE 40 MG/1
40 TABLET ORAL DAILY
Qty: 90 | Refills: 0 | Status: DISCONTINUED | COMMUNITY
Start: 2018-11-13 | End: 2020-11-11

## 2020-11-11 RX ORDER — AMIODARONE HYDROCHLORIDE 200 MG/1
200 TABLET ORAL TWICE DAILY
Qty: 60 | Refills: 3 | Status: DISCONTINUED | COMMUNITY
Start: 2020-11-05 | End: 2020-11-11

## 2020-11-11 RX ORDER — METOPROLOL SUCCINATE 100 MG/1
100 TABLET, EXTENDED RELEASE ORAL TWICE DAILY
Refills: 0 | Status: DISCONTINUED | COMMUNITY
End: 2020-11-11

## 2020-11-11 NOTE — DISCUSSION/SUMMARY
[FreeTextEntry1] : Increase Lasix to 40 mg BID\par Increase Metoprolol succinate to 100 mg BID\par Continue Amiodarone 200 mg daily\par Continue Eliquis\par Follow up 3 months

## 2020-11-11 NOTE — HISTORY OF PRESENT ILLNESS
[FreeTextEntry1] : 81 yo F with h/o HFpEF, MAUREEN, Morbid obesity, s/p recent hospitalization in [place] for new-onset AF and ?CHF, followed by ER visit three days ago.  Chronic RESTREPO likely multifactorial.  Lasix increased at last visit but now back on Lasix 20 mg BID.\par \par LOV 9/2019.  Follow up s/p discharge.  Admitted with DCHF and AF with RVR s/p unsuccessful CV.  RESTREPO unchanged.\par \par \par EKG (11/11/2020):   bpm, no ST-T changes\par Echo (7/17/19):  EF 55-65%, G1DD, Mild AI, Mild MR\par NST (12/7/17):  No evidence of stress-induced ischemia\par

## 2020-11-11 NOTE — PHYSICAL EXAM
[General Appearance - Well Developed] : well developed [General Appearance - In No Acute Distress] : no acute distress [Normal Conjunctiva] : the conjunctiva exhibited no abnormalities [Eyelids - No Xanthelasma] : the eyelids demonstrated no xanthelasmas [Respiration, Rhythm And Depth] : normal respiratory rhythm and effort [Auscultation Breath Sounds / Voice Sounds] : lungs were clear to auscultation bilaterally [Bowel Sounds] : normal bowel sounds [Abdomen Soft] : soft [Abdomen Tenderness] : non-tender [] : no hepato-splenomegaly [Oriented To Time, Place, And Person] : oriented to person, place, and time [FreeTextEntry1] : Irregular, chronic LE edema R>L with right foot ulcer

## 2020-11-23 ENCOUNTER — APPOINTMENT (OUTPATIENT)
Dept: GERIATRICS | Facility: CLINIC | Age: 80
End: 2020-11-23

## 2020-11-25 ENCOUNTER — APPOINTMENT (OUTPATIENT)
Dept: PODIATRY | Facility: CLINIC | Age: 80
End: 2020-11-25
Payer: MEDICAID

## 2020-11-25 ENCOUNTER — OUTPATIENT (OUTPATIENT)
Dept: OUTPATIENT SERVICES | Facility: HOSPITAL | Age: 80
LOS: 1 days | Discharge: HOME | End: 2020-11-25

## 2020-11-25 DIAGNOSIS — I83.009 VARICOSE VEINS OF UNSPECIFIED LOWER EXTREMITY WITH ULCER OF UNSPECIFIED SITE: ICD-10-CM

## 2020-11-25 DIAGNOSIS — M79.671 PAIN IN RIGHT FOOT: ICD-10-CM

## 2020-11-25 DIAGNOSIS — L97.519 NON-PRESSURE CHRONIC ULCER OF OTHER PART OF RIGHT FOOT WITH UNSPECIFIED SEVERITY: ICD-10-CM

## 2020-11-25 DIAGNOSIS — I89.0 LYMPHEDEMA, NOT ELSEWHERE CLASSIFIED: ICD-10-CM

## 2020-11-25 DIAGNOSIS — I73.9 PERIPHERAL VASCULAR DISEASE, UNSPECIFIED: ICD-10-CM

## 2020-11-25 PROCEDURE — 11042 DBRDMT SUBQ TIS 1ST 20SQCM/<: CPT | Mod: 59

## 2020-11-25 NOTE — PHYSICAL EXAM
[Ankle Swelling (On Exam)] : present [Ankle Swelling On The Right] : of the right ankle [Varicose Veins Of Lower Extremities] : not present [de-identified] : No Pain on palpation to wound site, Right lateral aspect [FreeTextEntry1] : Wound to lateral aspect of Right ankle. Fibrogranular wound base with rolled edge. \par Measurement: 6.0 cm x 2 cm \par

## 2020-11-25 NOTE — ASSESSMENT
[FreeTextEntry1] : Venous Stasis Wound, Right Ankle\par \par Patient seen and evaluated \par All findings discussed with patient \par Wound debrided with curette after topical lidocaine application \par Dressed with medihoneyl/dsd/abd/kerlix/coban\par Pt. RTC 2 weeks

## 2020-11-25 NOTE — HISTORY OF PRESENT ILLNESS
[FreeTextEntry1] : Pt presents with re-opening of prior wound to lateral aspect of Right ankle. She reports no pain to wound site.\par Pt admits that wound has been present for 2 months\par Denies N/V/F/C/shortness of breath. Pt. denies any other pedal complaints at this time.\par

## 2020-11-29 NOTE — PHYSICAL EXAM
[Ankle Swelling (On Exam)] : present [Ankle Swelling On The Right] : of the right ankle [Varicose Veins Of Lower Extremities] : not present [de-identified] : No Pain on palpation to wound site, Right lateral aspect [FreeTextEntry1] : Wound to lateral aspect of Right ankle. Fibrogranular wound base with rolled edge. \par Measurement: 6.0 cm x 2 cm \par

## 2020-12-02 ENCOUNTER — APPOINTMENT (OUTPATIENT)
Dept: GERIATRICS | Facility: CLINIC | Age: 80
End: 2020-12-02

## 2020-12-02 ENCOUNTER — APPOINTMENT (OUTPATIENT)
Dept: GERIATRICS | Facility: CLINIC | Age: 80
End: 2020-12-02
Payer: MEDICAID

## 2020-12-02 ENCOUNTER — OUTPATIENT (OUTPATIENT)
Dept: OUTPATIENT SERVICES | Facility: HOSPITAL | Age: 80
LOS: 1 days | Discharge: HOME | End: 2020-12-02

## 2020-12-02 PROCEDURE — ZZZZZ: CPT

## 2020-12-02 NOTE — REASON FOR VISIT
[Home] : at home, [unfilled] , at the time of the visit. [Medical Office: ([facility])___] : at the medical office located in  [Other:____] : [unfilled] [Verbal consent obtained from patient] : the patient, [unfilled] [Follow-Up] : a follow-up visit

## 2020-12-04 DIAGNOSIS — I10 ESSENTIAL (PRIMARY) HYPERTENSION: ICD-10-CM

## 2020-12-04 DIAGNOSIS — I48.91 UNSPECIFIED ATRIAL FIBRILLATION: ICD-10-CM

## 2020-12-07 NOTE — ASSESSMENT
[FreeTextEntry1] : #Afib with RVR\par - C/w Amiodarone 200 mg \par - Toprol increased from 50 to 100 mg BID\par - Discontinue diltiazem\par \par #Hypothyroidisim\par - c/w Levothyroxine 75mcg daily\par - Repeat TSH prior to next office visit.\par \par #Microcytic Anemia\par - No complaints of bleeding or weight loss\par \par #CHFpEF\par - Continue on current meds\par - cardio following, continue lasix 40 mg, metoprolol, amiodarone, and eliquis\par \par #HTN\par - Continue on meds \par - Stopped Lisinopril\par \par R. Foot ulcer\par -C/w LWC\par \par Health maintenance\par - 3 Month F/u\par - Flu shot given in hospital.  [Medication Management] : medication management

## 2020-12-07 NOTE — HISTORY OF PRESENT ILLNESS
[FreeTextEntry1] : This is a 80 year old female with PMHx of CHFpEF, A-fib, HTN, Hypothyroidism who is here for a F/U visit. Grand-daughter is here to provide translation. Pt was recently admitted to the hospital 10/21-10/25 for A.Fib with RVR. Was started on Amiodarone. Toprol dose was increased from 25 to 100 BID. Lisinopril has been dcd. Patient will call back and update on BP.

## 2020-12-09 ENCOUNTER — OUTPATIENT (OUTPATIENT)
Dept: OUTPATIENT SERVICES | Facility: HOSPITAL | Age: 80
LOS: 1 days | Discharge: HOME | End: 2020-12-09

## 2020-12-09 ENCOUNTER — APPOINTMENT (OUTPATIENT)
Dept: PODIATRY | Facility: CLINIC | Age: 80
End: 2020-12-09
Payer: MEDICAID

## 2020-12-09 ENCOUNTER — OUTPATIENT (OUTPATIENT)
Dept: OUTPATIENT SERVICES | Facility: HOSPITAL | Age: 80
LOS: 1 days | Discharge: HOME | End: 2020-12-09
Payer: MEDICAID

## 2020-12-09 DIAGNOSIS — M79.671 PAIN IN RIGHT FOOT: ICD-10-CM

## 2020-12-09 DIAGNOSIS — M79.609 PAIN IN UNSPECIFIED LIMB: ICD-10-CM

## 2020-12-09 PROCEDURE — 11042 DBRDMT SUBQ TIS 1ST 20SQCM/<: CPT

## 2020-12-09 PROCEDURE — 73610 X-RAY EXAM OF ANKLE: CPT | Mod: 26,RT

## 2020-12-09 PROCEDURE — 73630 X-RAY EXAM OF FOOT: CPT | Mod: 26,RT

## 2020-12-09 PROCEDURE — 99213 OFFICE O/P EST LOW 20 MIN: CPT | Mod: 25

## 2020-12-16 ENCOUNTER — APPOINTMENT (OUTPATIENT)
Dept: PODIATRY | Facility: CLINIC | Age: 80
End: 2020-12-16
Payer: MEDICAID

## 2020-12-16 ENCOUNTER — OUTPATIENT (OUTPATIENT)
Dept: OUTPATIENT SERVICES | Facility: HOSPITAL | Age: 80
LOS: 1 days | Discharge: HOME | End: 2020-12-16

## 2020-12-16 DIAGNOSIS — I83.009 VARICOSE VEINS OF UNSPECIFIED LOWER EXTREMITY WITH ULCER OF UNSPECIFIED SITE: ICD-10-CM

## 2020-12-16 DIAGNOSIS — L97.909 VARICOSE VEINS OF UNSPECIFIED LOWER EXTREMITY WITH ULCER OF UNSPECIFIED SITE: ICD-10-CM
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
## 2020-12-16 PROCEDURE — 11042 DBRDMT SUBQ TIS 1ST 20SQCM/<: CPT

## 2020-12-22 PROBLEM — M79.671 RIGHT FOOT PAIN: Status: ACTIVE | Noted: 2017-02-08

## 2020-12-22 NOTE — PHYSICAL EXAM
[Ankle Swelling (On Exam)] : present [Ankle Swelling On The Right] : of the right ankle [Varicose Veins Of Lower Extremities] : not present [de-identified] : No Pain on palpation to wound site, Right lateral aspect [FreeTextEntry1] : Wound to lateral aspect of Right ankle. Fibrogranular wound base with rolled edge. \par Measurement: 6.0 cm x 2 cm \par

## 2020-12-22 NOTE — HISTORY OF PRESENT ILLNESS
[FreeTextEntry1] : Pt presents with re-opening of prior wound to lateral aspect of Right ankle. She reports no pain to wound site.\par Patient recently fell in home and is having pain at right ankle \par Denies N/V/F/C/shortness of breath. Pt. denies any other pedal complaints at this time.\par

## 2020-12-23 DIAGNOSIS — I83.013 VARICOSE VEINS OF RIGHT LOWER EXTREMITY WITH ULCER OF ANKLE: ICD-10-CM

## 2020-12-23 DIAGNOSIS — L97.519 NON-PRESSURE CHRONIC ULCER OF OTHER PART OF RIGHT FOOT WITH UNSPECIFIED SEVERITY: ICD-10-CM

## 2020-12-30 DIAGNOSIS — I83.009 VARICOSE VEINS OF UNSPECIFIED LOWER EXTREMITY WITH ULCER OF UNSPECIFIED SITE: ICD-10-CM

## 2020-12-30 DIAGNOSIS — E66.01 MORBID (SEVERE) OBESITY DUE TO EXCESS CALORIES: ICD-10-CM

## 2020-12-30 DIAGNOSIS — I89.0 LYMPHEDEMA, NOT ELSEWHERE CLASSIFIED: ICD-10-CM
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
## 2020-12-30 NOTE — REASON FOR VISIT
[Follow-Up Visit] : a follow-up visit for [Family Member] : family member [FreeTextEntry2] : right foot wound

## 2020-12-30 NOTE — PHYSICAL EXAM
[4 x 4] : 4 x 4  [Abdominal Pad] : Abdominal Pad [Kerlix] : Kerlix [FreeTextEntry2] : 3 [FreeTextEntry3] : 1 [de-identified] : medihoney [TWNoteComboBox1] : Right [TWNoteComboBox2] : 1 [TWNoteComboBox4] : None [TWNoteComboBox5] : No [TWNoteComboBox6] : Venous [de-identified] : None [de-identified] : 100% [de-identified] : Daily [de-identified] : Primary Dressing

## 2020-12-30 NOTE — HISTORY OF PRESENT ILLNESS
[FreeTextEntry1] : A 81 y/o female pt presents to clinic for right dorsal wound.\par pt had this wound for 5 months, it was closed but opened up again.\par pt has been applying medihoney daily.

## 2020-12-30 NOTE — ASSESSMENT
[FreeTextEntry1] : right foot wound washed with soap and water, applied medihoney dsd kerlix ace\par right foot xray reviewed, no fx noted.\par weibht bearing as tolerated w darco shoe\par daily dressing change\par RTC in 2 weeks [Verbal] : verbal [Patient] : patient [Family member] : family member [Good - alert, interested, motivated] : Good - alert, interested, motivated

## 2021-01-06 ENCOUNTER — APPOINTMENT (OUTPATIENT)
Dept: PODIATRY | Facility: CLINIC | Age: 81
End: 2021-01-06
Payer: MEDICAID

## 2021-01-06 ENCOUNTER — OUTPATIENT (OUTPATIENT)
Dept: OUTPATIENT SERVICES | Facility: HOSPITAL | Age: 81
LOS: 1 days | Discharge: HOME | End: 2021-01-06

## 2021-01-06 DIAGNOSIS — L97.519 NON-PRESSURE CHRONIC ULCER OF OTHER PART OF RIGHT FOOT WITH UNSPECIFIED SEVERITY: ICD-10-CM

## 2021-01-06 PROCEDURE — 11042 DBRDMT SUBQ TIS 1ST 20SQCM/<: CPT

## 2021-01-20 ENCOUNTER — OUTPATIENT (OUTPATIENT)
Dept: OUTPATIENT SERVICES | Facility: HOSPITAL | Age: 81
LOS: 1 days | Discharge: HOME | End: 2021-01-20

## 2021-01-20 ENCOUNTER — APPOINTMENT (OUTPATIENT)
Dept: PODIATRY | Facility: CLINIC | Age: 81
End: 2021-01-20
Payer: MEDICAID

## 2021-01-20 DIAGNOSIS — L97.519 NON-PRESSURE CHRONIC ULCER OF OTHER PART OF RIGHT FOOT WITH UNSPECIFIED SEVERITY: ICD-10-CM

## 2021-01-20 PROCEDURE — 99213 OFFICE O/P EST LOW 20 MIN: CPT

## 2021-01-20 NOTE — PHYSICAL EXAM
[4 x 4] : 4 x 4  [Abdominal Pad] : Abdominal Pad [Kerlix] : Kerlix [FreeTextEntry2] : 3 [FreeTextEntry3] : 1 [de-identified] : medihoney [TWNoteComboBox1] : Right [TWNoteComboBox2] : 1 [TWNoteComboBox4] : None [TWNoteComboBox5] : No [TWNoteComboBox6] : Venous [de-identified] : None [de-identified] : 100% [de-identified] : Daily [de-identified] : Primary Dressing

## 2021-01-20 NOTE — HISTORY OF PRESENT ILLNESS
[FreeTextEntry1] : A 79 y/o female pt presents to clinic for right dorsal wound.\par pt has been applying medihoney daily.

## 2021-01-20 NOTE — ASSESSMENT
[FreeTextEntry1] : right foot wound washed with soap and water, area appears clean and free of infection\par patient healed \par follow up in 1 month  [Verbal] : verbal [Patient] : patient [Family member] : family member [Good - alert, interested, motivated] : Good - alert, interested, motivated

## 2021-01-26 PROBLEM — L97.519 RIGHT FOOT ULCER: Status: ACTIVE | Noted: 2018-07-09

## 2021-01-26 NOTE — ASSESSMENT
[FreeTextEntry1] : Wound debrided to level of subcutaneous tissue with # 15 blade and dermal curette \par Wound cleansed and Dressed with Sterile dressing RIght foot \par Follow up in 1 week\par  [Verbal] : verbal [Patient] : patient [Family member] : family member [Good - alert, interested, motivated] : Good - alert, interested, motivated

## 2021-01-26 NOTE — PHYSICAL EXAM
[4 x 4] : 4 x 4  [Abdominal Pad] : Abdominal Pad [Kerlix] : Kerlix [FreeTextEntry2] : 3 [FreeTextEntry3] : 1 [de-identified] : medihoney [TWNoteComboBox2] : 1 [TWNoteComboBox1] : Right [TWNoteComboBox4] : None [TWNoteComboBox5] : No [TWNoteComboBox6] : Venous [de-identified] : None [de-identified] : 100% [de-identified] : Daily [de-identified] : Primary Dressing

## 2021-02-11 ENCOUNTER — LABORATORY RESULT (OUTPATIENT)
Age: 81
End: 2021-02-11

## 2021-02-11 ENCOUNTER — APPOINTMENT (OUTPATIENT)
Dept: GERIATRICS | Facility: CLINIC | Age: 81
End: 2021-02-11
Payer: MEDICAID

## 2021-02-11 ENCOUNTER — OUTPATIENT (OUTPATIENT)
Dept: OUTPATIENT SERVICES | Facility: HOSPITAL | Age: 81
LOS: 1 days | Discharge: HOME | End: 2021-02-11

## 2021-02-11 VITALS
HEIGHT: 61 IN | TEMPERATURE: 96.7 F | BODY MASS INDEX: 54.75 KG/M2 | WEIGHT: 290 LBS | RESPIRATION RATE: 18 BRPM | SYSTOLIC BLOOD PRESSURE: 123 MMHG | DIASTOLIC BLOOD PRESSURE: 76 MMHG | HEART RATE: 96 BPM

## 2021-02-11 DIAGNOSIS — E55.9 VITAMIN D DEFICIENCY, UNSPECIFIED: ICD-10-CM

## 2021-02-11 PROCEDURE — 99214 OFFICE O/P EST MOD 30 MIN: CPT

## 2021-02-11 NOTE — PHYSICAL EXAM
[General Appearance - Alert] : alert [General Appearance - In No Acute Distress] : in no acute distress [Sclera] : the sclera and conjunctiva were normal [Normal Oral Mucosa] : normal oral mucosa [Neck Appearance] : the appearance of the neck was normal [Respiration, Rhythm And Depth] : normal respiratory rhythm and effort [Apical Impulse] : the apical impulse was normal [Heart Sounds] : normal S1 and S2 [Arterial Pulses Carotid] : carotid pulses were normal with no bruits [Bowel Sounds] : normal bowel sounds [Abdomen Soft] : soft [Abdomen Tenderness] : non-tender [] : no hepato-splenomegaly [No CVA Tenderness] : no ~M costovertebral angle tenderness [Abnormal Walk] : normal gait [Cranial Nerves] : cranial nerves 2-12 were intact

## 2021-02-12 DIAGNOSIS — I50.9 HEART FAILURE, UNSPECIFIED: ICD-10-CM

## 2021-02-12 DIAGNOSIS — I10 ESSENTIAL (PRIMARY) HYPERTENSION: ICD-10-CM

## 2021-02-12 DIAGNOSIS — E78.5 HYPERLIPIDEMIA, UNSPECIFIED: ICD-10-CM

## 2021-02-12 DIAGNOSIS — I48.91 UNSPECIFIED ATRIAL FIBRILLATION: ICD-10-CM

## 2021-02-12 DIAGNOSIS — R05 COUGH: ICD-10-CM

## 2021-02-12 NOTE — ASSESSMENT
[Medication Management] : medication management [FreeTextEntry1] : 81 y/o obese F with PMHx of HFpEF, A-fib on eliquis, HTN, Hypothyroidism who is here for a F/U visit. Grand-daughter is here to provide translation. Pt was admitted to the hospital 10/21-10/25 for A.Fib with RVR. Was started on Amiodarone. \par \par #Afib with RVR\par - C/w Amiodarone 200 mg. Toprol increased from 50 to 100 mg BID, will check TFT, LFTS, referral for ophto\par \par # Hypothyroidisim\par - c/w Levothyroxine 75mcg daily\par - Repeat TSH today\par \par # Microcytic Anemia\par - No complaints of bleeding or weight loss\par - was treated with ferrous sulfate\par - referral for colonoscopy given. \par \par #CHFpEF\par - cardio following, metoprolol, amiodarone, and eliquis\par - will decrease lasix to 20 mg QD due to cough after taking medication. Patient has appointment with Dr Berger next week for follow up\par \par #HTN - controlled. 123/76 today\par - Continue on meds \par - Stopped Lisinopril\par \par # Obesity\par - patient counselled on the importance of healthy diet and exercise.\par \par R. Foot ulcer\par -C/w LWC\par \par Health maintenance\par - 3 Month F/u\par - Flu shot given in hospital. \par - referral for colo\par - routine blood work\par \par RTC in 3 months or PRN

## 2021-02-12 NOTE — SOCIAL HISTORY
[Independent] : feeding [Some assistance needed] : managing finances [Full assistance needed] : using transportation [Walker] : walker

## 2021-02-12 NOTE — HISTORY OF PRESENT ILLNESS
[FreeTextEntry1] : 79 y/o obese F with PMHx of HFpEF, A-fib on eliquis, HTN, Hypothyroidism who is here for a F/U visit. Grand-daughter is here to provide translation. Pt was admitted to the hospital 10/21-10/25 for A.Fib with RVR. Was started on Amiodarone. Toprol dose was increased from 25 to 100 BID. Lisinopril was been discontinued. \par \par Also endorses a productive cough of clear phlegm for past 3 weeks. No fever/chills, no chest pain, palpitations.

## 2021-02-17 ENCOUNTER — OUTPATIENT (OUTPATIENT)
Dept: OUTPATIENT SERVICES | Facility: HOSPITAL | Age: 81
LOS: 1 days | Discharge: HOME | End: 2021-02-17

## 2021-02-17 ENCOUNTER — APPOINTMENT (OUTPATIENT)
Dept: GASTROENTEROLOGY | Facility: CLINIC | Age: 81
End: 2021-02-17
Payer: MEDICAID

## 2021-02-17 ENCOUNTER — APPOINTMENT (OUTPATIENT)
Dept: CARDIOLOGY | Facility: CLINIC | Age: 81
End: 2021-02-17
Payer: MEDICAID

## 2021-02-17 VITALS
BODY MASS INDEX: 55.17 KG/M2 | WEIGHT: 292 LBS | DIASTOLIC BLOOD PRESSURE: 80 MMHG | HEART RATE: 110 BPM | TEMPERATURE: 96.6 F | SYSTOLIC BLOOD PRESSURE: 140 MMHG

## 2021-02-17 VITALS
OXYGEN SATURATION: 93 % | HEIGHT: 61 IN | TEMPERATURE: 98.7 F | DIASTOLIC BLOOD PRESSURE: 82 MMHG | HEART RATE: 44 BPM | BODY MASS INDEX: 55.13 KG/M2 | WEIGHT: 292 LBS | SYSTOLIC BLOOD PRESSURE: 134 MMHG

## 2021-02-17 DIAGNOSIS — D50.9 IRON DEFICIENCY ANEMIA, UNSPECIFIED: ICD-10-CM

## 2021-02-17 DIAGNOSIS — R74.8 ABNORMAL LEVELS OF OTHER SERUM ENZYMES: ICD-10-CM

## 2021-02-17 DIAGNOSIS — Z12.11 ENCOUNTER FOR SCREENING FOR MALIGNANT NEOPLASM OF COLON: ICD-10-CM

## 2021-02-17 DIAGNOSIS — Z12.12 ENCOUNTER FOR SCREENING FOR MALIGNANT NEOPLASM OF COLON: ICD-10-CM

## 2021-02-17 DIAGNOSIS — R63.4 ABNORMAL WEIGHT LOSS: ICD-10-CM

## 2021-02-17 PROCEDURE — 99204 OFFICE O/P NEW MOD 45 MIN: CPT

## 2021-02-17 PROCEDURE — 93000 ELECTROCARDIOGRAM COMPLETE: CPT

## 2021-02-17 PROCEDURE — 99214 OFFICE O/P EST MOD 30 MIN: CPT

## 2021-02-17 PROCEDURE — 99072 ADDL SUPL MATRL&STAF TM PHE: CPT

## 2021-02-17 RX ORDER — METOPROLOL SUCCINATE 100 MG/1
100 TABLET, EXTENDED RELEASE ORAL
Qty: 180 | Refills: 3 | Status: DISCONTINUED | COMMUNITY
Start: 2020-11-11 | End: 2021-02-17

## 2021-02-17 RX ORDER — METOPROLOL TARTRATE 50 MG/1
50 TABLET, FILM COATED ORAL
Qty: 60 | Refills: 5 | Status: DISCONTINUED | COMMUNITY
End: 2021-02-17

## 2021-02-17 NOTE — HISTORY OF PRESENT ILLNESS
[de-identified] :  ID#[id number] \par 81 yo female pmh a-fib on eliquis, HTN, hypothyrodism presents for evaluation for CRC screening. Patient reports 15lb weight loss in the past 3 months unintentional. Patient denies nausea ,vomiting, hematemesis, melena, blood in stool, diarrhea, constipation, dysphagia, abdominal pain. +weight loss.

## 2021-02-17 NOTE — REVIEW OF SYSTEMS
[Recent Weight Loss (___ Lbs)] : recent [unfilled] ~Ulb weight loss [Negative] : Heme/Lymph [Fever] : no fever [Chills] : no chills [Feeling Poorly] : not feeling poorly [Feeling Tired] : not feeling tired

## 2021-02-17 NOTE — DISCUSSION/SUMMARY
[FreeTextEntry1] : Baseline RESTREPO unchanged and likely multifactorial\par Euvolemic\par \par EKG (2/17/21):   bpm, no ST-T changes\par \par Continue Lasix 40 mg QAM and 20 QPM\par Increase Metoprolol tartrate to 50 mg TID\par Continue Amiodarone 200 mg daily\par Continue Eliquis\par Follow up 4 months

## 2021-02-17 NOTE — PHYSICAL EXAM
[General Appearance - Alert] : alert [General Appearance - In No Acute Distress] : in no acute distress [General Appearance - Well Nourished] : well nourished [Sclera] : the sclera and conjunctiva were normal [Outer Ear] : the ears and nose were normal in appearance [Hearing Threshold Finger Rub Not [place]] : hearing was normal [Neck Appearance] : the appearance of the neck was normal [Neck Cervical Mass (___cm)] : no neck mass was observed [] : no respiratory distress [Respiration, Rhythm And Depth] : normal respiratory rhythm and effort [Apical Impulse] : the apical impulse was normal [Heart Rate And Rhythm] : heart rate was normal and rhythm regular [Heart Sounds] : normal S1 and S2 [Bowel Sounds] : normal bowel sounds [Abdomen Soft] : soft [Abdomen Tenderness] : non-tender [Abdomen Mass (___ Cm)] : no abdominal mass palpated [Oriented To Time, Place, And Person] : oriented to person, place, and time [Impaired Insight] : insight and judgment were intact [Affect] : the affect was normal [Mood] : the mood was normal

## 2021-02-17 NOTE — PHYSICAL EXAM
[General Appearance - Well Developed] : well developed [General Appearance - In No Acute Distress] : no acute distress [Normal Conjunctiva] : the conjunctiva exhibited no abnormalities [Eyelids - No Xanthelasma] : the eyelids demonstrated no xanthelasmas [Auscultation Breath Sounds / Voice Sounds] : lungs were clear to auscultation bilaterally [Respiration, Rhythm And Depth] : normal respiratory rhythm and effort [Bowel Sounds] : normal bowel sounds [Abdomen Soft] : soft [] : no hepato-splenomegaly [Abdomen Tenderness] : non-tender [Oriented To Time, Place, And Person] : oriented to person, place, and time [FreeTextEntry1] : Irregular, chronic LE edema R>L with right foot ulcer

## 2021-02-17 NOTE — HISTORY OF PRESENT ILLNESS
[FreeTextEntry1] : 79 yo F with HFpEF, MAUREEN, Morbid obesity, s/p recent hospitalization in [place] for new-onset AF and ?CHF, followed by ER visit three days ago.  Chronic RESTREPO likely multifactorial.  Lasix increased at last visit but now back on Lasix 20 mg BID.\par \par LOV 9/2019.  Follow up s/p discharge.  Admitted with DCHF and AF with RVR s/p unsuccessful CV.  RESTREPO unchanged.\par \par PCP decreased evening dose of Lasix to 20 mg due to cough.\par \par \par EKG (11/11/2020):   bpm, no ST-T changes\par Echo (7/17/19):  EF 55-65%, G1DD, Mild AI, Mild MR\par Adenosine NST (12/2017):  No evidence of stress-induced ischemia\par

## 2021-02-22 LAB
25(OH)D3 SERPL-MCNC: 33.2 NG/ML
ALBUMIN SERPL ELPH-MCNC: 4 G/DL
ALP BLD-CCNC: 143 U/L
ALT SERPL-CCNC: 15 U/L
ANION GAP SERPL CALC-SCNC: 14 MMOL/L
AST SERPL-CCNC: 20 U/L
BASOPHILS # BLD AUTO: 0.02 K/UL
BASOPHILS NFR BLD AUTO: 0.3 %
BILIRUB SERPL-MCNC: 0.3 MG/DL
BUN SERPL-MCNC: 26 MG/DL
CALCIUM SERPL-MCNC: 9.1 MG/DL
CHLORIDE SERPL-SCNC: 102 MMOL/L
CHOLEST SERPL-MCNC: 157 MG/DL
CO2 SERPL-SCNC: 26 MMOL/L
CREAT SERPL-MCNC: 1.19 MG/DL
EOSINOPHIL # BLD AUTO: 0.07 K/UL
EOSINOPHIL NFR BLD AUTO: 1.1 %
ESTIMATED AVERAGE GLUCOSE: 128 MG/DL
FERRITIN SERPL-MCNC: 24 NG/ML
GLUCOSE SERPL-MCNC: 133 MG/DL
HBA1C MFR BLD HPLC: 6.1 %
HCT VFR BLD CALC: 44.1 %
HDLC SERPL-MCNC: 62 MG/DL
HGB BLD-MCNC: 13.5 G/DL
IMM GRANULOCYTES NFR BLD AUTO: 0.3 %
IRON SATN MFR SERPL: 10 %
IRON SERPL-MCNC: 35 UG/DL
LDLC SERPL CALC-MCNC: 73 MG/DL
LYMPHOCYTES # BLD AUTO: 1.11 K/UL
LYMPHOCYTES NFR BLD AUTO: 16.7 %
MAN DIFF?: NORMAL
MCHC RBC-ENTMCNC: 28.2 PG
MCHC RBC-ENTMCNC: 30.6 GM/DL
MCV RBC AUTO: 92.3 FL
MONOCYTES # BLD AUTO: 0.42 K/UL
MONOCYTES NFR BLD AUTO: 6.3 %
NEUTROPHILS # BLD AUTO: 5.02 K/UL
NEUTROPHILS NFR BLD AUTO: 75.3 %
NONHDLC SERPL-MCNC: 95 MG/DL
PLATELET # BLD AUTO: 229 K/UL
POTASSIUM SERPL-SCNC: 4.1 MMOL/L
PROT SERPL-MCNC: 7.1 G/DL
RBC # BLD: 4.78 M/UL
RBC # FLD: 19.3 %
SODIUM SERPL-SCNC: 141 MMOL/L
TIBC SERPL-MCNC: 339 UG/DL
TRANSFERRIN SERPL-MCNC: 297 MG/DL
TRIGL SERPL-MCNC: 107 MG/DL
TSH SERPL-ACNC: 7.74 UIU/ML
UIBC SERPL-MCNC: 305 UG/DL
WBC # FLD AUTO: 6.66 K/UL

## 2021-02-28 ENCOUNTER — OUTPATIENT (OUTPATIENT)
Dept: OUTPATIENT SERVICES | Facility: HOSPITAL | Age: 81
LOS: 1 days | Discharge: HOME | End: 2021-02-28

## 2021-02-28 ENCOUNTER — LABORATORY RESULT (OUTPATIENT)
Age: 81
End: 2021-02-28

## 2021-02-28 DIAGNOSIS — Z11.59 ENCOUNTER FOR SCREENING FOR OTHER VIRAL DISEASES: ICD-10-CM

## 2021-03-03 ENCOUNTER — APPOINTMENT (OUTPATIENT)
Dept: PODIATRY | Facility: CLINIC | Age: 81
End: 2021-03-03

## 2021-03-03 ENCOUNTER — OUTPATIENT (OUTPATIENT)
Dept: OUTPATIENT SERVICES | Facility: HOSPITAL | Age: 81
LOS: 1 days | Discharge: HOME | End: 2021-03-03
Payer: MEDICAID

## 2021-03-03 ENCOUNTER — TRANSCRIPTION ENCOUNTER (OUTPATIENT)
Age: 81
End: 2021-03-03

## 2021-03-03 VITALS
DIASTOLIC BLOOD PRESSURE: 75 MMHG | HEART RATE: 108 BPM | OXYGEN SATURATION: 98 % | SYSTOLIC BLOOD PRESSURE: 124 MMHG | RESPIRATION RATE: 18 BRPM

## 2021-03-03 VITALS — WEIGHT: 274.92 LBS | HEIGHT: 64.96 IN

## 2021-03-03 DIAGNOSIS — Z98.51 TUBAL LIGATION STATUS: Chronic | ICD-10-CM

## 2021-03-03 PROCEDURE — 45378 DIAGNOSTIC COLONOSCOPY: CPT

## 2021-03-03 NOTE — H&P PST ADULT - HISTORY OF PRESENT ILLNESS
79 yo female pmh a-fib on eliquis, HTN, hypothyrodism presents for evaluation for CRC screening. Patient reports 15lb weight loss in the past 3 months unintentional. +weight loss. Patient denies nausea ,vomiting, hematemesis, melena, blood in stool, diarrhea, constipation, dysphagia, abdominal pain.    81 yo female pmh a-fib on eliquis, HTN, hypothyrodism presents for evaluation for CRC screening. Patient reports 15lb weight loss in the past 3 months unintentional. +weight loss. Patient denies nausea ,vomiting, hematemesis, melena, blood in stool, diarrhea, constipation, dysphagia, abdominal pain.   patient had also LESLIE so will do EGD 79 yo female pmh a-fib on eliquis, HTN, hypothyrodism presents for evaluation for CRC screening. Patient reports 15lb weight loss in the past 3 months unintentional. +weight loss. Patient denies nausea ,vomiting, hematemesis, melena, blood in stool, diarrhea, constipation, dysphagia, abdominal pain.   patient had also LESLIE so will do EGD  patient off eliquis for 1 week now 81 yo female pmh a-fib on eliquis, HTN, hypothyrodism presents for evaluation for CRC screening. Patient reports 15lb weight loss in the past 3 months unintentional. +weight loss. Patient denies nausea ,vomiting, hematemesis, melena, blood in stool, diarrhea, constipation, dysphagia, abdominal pain.   patient off eliquis for 1 week now

## 2021-03-03 NOTE — ASU DISCHARGE PLAN (ADULT/PEDIATRIC) - CALL YOUR DOCTOR IF YOU HAVE ANY OF THE FOLLOWING:
Bleeding that does not stop/Swelling that gets worse/Wound/Surgical Site with redness, or foul smelling discharge or pus/Nausea and vomiting that does not stop/Inability to tolerate liquids or foods/Increased irritability or sluggishness

## 2021-03-03 NOTE — H&P PST ADULT - ASSESSMENT
patient is here for colonoscopy r/o CRC patient is here for EGD and colonoscopy for LESLIE and CRC screening.    patient is here for colonoscopy for CRC screening.

## 2021-03-08 DIAGNOSIS — E03.9 HYPOTHYROIDISM, UNSPECIFIED: ICD-10-CM

## 2021-03-08 DIAGNOSIS — E66.9 OBESITY, UNSPECIFIED: ICD-10-CM

## 2021-03-08 DIAGNOSIS — Z12.11 ENCOUNTER FOR SCREENING FOR MALIGNANT NEOPLASM OF COLON: ICD-10-CM

## 2021-03-08 DIAGNOSIS — I10 ESSENTIAL (PRIMARY) HYPERTENSION: ICD-10-CM

## 2021-03-08 DIAGNOSIS — K64.8 OTHER HEMORRHOIDS: ICD-10-CM

## 2021-03-08 DIAGNOSIS — Z79.01 LONG TERM (CURRENT) USE OF ANTICOAGULANTS: ICD-10-CM

## 2021-03-08 DIAGNOSIS — K64.4 RESIDUAL HEMORRHOIDAL SKIN TAGS: ICD-10-CM

## 2021-03-08 DIAGNOSIS — K57.30 DIVERTICULOSIS OF LARGE INTESTINE WITHOUT PERFORATION OR ABSCESS WITHOUT BLEEDING: ICD-10-CM

## 2021-03-18 ENCOUNTER — RX RENEWAL (OUTPATIENT)
Age: 81
End: 2021-03-18

## 2021-04-16 ENCOUNTER — APPOINTMENT (OUTPATIENT)
Dept: OPHTHALMOLOGY | Facility: CLINIC | Age: 81
End: 2021-04-16

## 2021-04-16 ENCOUNTER — OUTPATIENT (OUTPATIENT)
Dept: OUTPATIENT SERVICES | Facility: HOSPITAL | Age: 81
LOS: 1 days | Discharge: HOME | End: 2021-04-16
Payer: MEDICAID

## 2021-04-16 DIAGNOSIS — Z98.51 TUBAL LIGATION STATUS: Chronic | ICD-10-CM

## 2021-04-16 PROCEDURE — 92202 OPSCPY EXTND ON/MAC DRAW: CPT

## 2021-04-16 PROCEDURE — 92014 COMPRE OPH EXAM EST PT 1/>: CPT

## 2021-04-16 PROCEDURE — 92133 CPTRZD OPH DX IMG PST SGM ON: CPT | Mod: 26

## 2021-05-05 ENCOUNTER — RX RENEWAL (OUTPATIENT)
Age: 81
End: 2021-05-05

## 2021-05-24 ENCOUNTER — RX RENEWAL (OUTPATIENT)
Age: 81
End: 2021-05-24

## 2021-09-15 ENCOUNTER — APPOINTMENT (OUTPATIENT)
Dept: CARDIOLOGY | Facility: CLINIC | Age: 81
End: 2021-09-15

## 2021-10-12 ENCOUNTER — APPOINTMENT (OUTPATIENT)
Dept: OPHTHALMOLOGY | Facility: CLINIC | Age: 81
End: 2021-10-12

## 2021-10-12 ENCOUNTER — OUTPATIENT (OUTPATIENT)
Dept: OUTPATIENT SERVICES | Facility: HOSPITAL | Age: 81
LOS: 1 days | Discharge: HOME | End: 2021-10-12
Payer: MEDICAID

## 2021-10-12 DIAGNOSIS — Z98.51 TUBAL LIGATION STATUS: Chronic | ICD-10-CM

## 2021-10-12 PROCEDURE — ZZZZZ: CPT

## 2021-11-16 ENCOUNTER — APPOINTMENT (OUTPATIENT)
Dept: CARDIOLOGY | Facility: CLINIC | Age: 81
End: 2021-11-16
Payer: MEDICAID

## 2021-11-16 VITALS
WEIGHT: 293 LBS | HEART RATE: 87 BPM | SYSTOLIC BLOOD PRESSURE: 138 MMHG | BODY MASS INDEX: 55.32 KG/M2 | DIASTOLIC BLOOD PRESSURE: 84 MMHG | HEIGHT: 61 IN | TEMPERATURE: 98.1 F

## 2021-11-16 PROCEDURE — 99072 ADDL SUPL MATRL&STAF TM PHE: CPT

## 2021-11-16 PROCEDURE — 93000 ELECTROCARDIOGRAM COMPLETE: CPT

## 2021-11-16 PROCEDURE — 99214 OFFICE O/P EST MOD 30 MIN: CPT

## 2021-11-16 NOTE — PHYSICAL EXAM
[Well Developed] : well developed [Well Nourished] : well nourished [No Acute Distress] : no acute distress [Normal Conjunctiva] : normal conjunctiva [Normal Venous Pressure] : normal venous pressure [No Carotid Bruit] : no carotid bruit [Clear Lung Fields] : clear lung fields [Good Air Entry] : good air entry [No Respiratory Distress] : no respiratory distress  [Soft] : abdomen soft [Non Tender] : non-tender [No Masses/organomegaly] : no masses/organomegaly [Normal Bowel Sounds] : normal bowel sounds [Normal Gait] : normal gait [No Cyanosis] : no cyanosis [No Clubbing] : no clubbing [No Varicosities] : no varicosities [No Rash] : no rash [No Skin Lesions] : no skin lesions [Moves all extremities] : moves all extremities [No Focal Deficits] : no focal deficits [Normal Speech] : normal speech [Alert and Oriented] : alert and oriented [Normal memory] : normal memory [de-identified] : Irregular, no murmurs, lymphedema

## 2021-11-16 NOTE — HISTORY OF PRESENT ILLNESS
[FreeTextEntry1] : 80 yo F with HFpEF, MAUREEN, morbid obesity, s/p recent hospitalization in [place] for new-onset AF and ?CHF, followed by ER visit three days ago.  Chronic RESTREPO likely multifactorial.  Lasix increased at last visit but now back on Lasix 20 mg BID.\par \par LOV 9/2019.  Follow up s/p discharge.  Admitted with DCHF and AF with RVR s/p unsuccessful cardioversion.\par \par Breathing improved on Lasix 40 mg TID.  Dry cough at night possibly postnasal drip.\par \par \par EKG (11/16/2021):  AF, no ST-T changes\par EKG (11/11/2020):   bpm, no ST-T changes\par Echo (7/17/19):  EF 55-65%, G1DD, Mild AI, Mild MR\par Adenosine NST (12/2017):  No evidence of stress-induced ischemia\par

## 2021-11-16 NOTE — ASSESSMENT
[FreeTextEntry1] : Baseline SOB unchanged and likely multifactorial\par Chronic AF, rate controlled, on Amiodarone\par \par Continue Lasix 40 mg BID-TID\par Will consider changing to Bumex if no improvement\par Continue Metoprolol tartrate 50 mg BID\par Continue Amiodarone 200 mg daily\par Continue Eliquis\par Follow up 4 months

## 2021-11-24 ENCOUNTER — OUTPATIENT (OUTPATIENT)
Dept: OUTPATIENT SERVICES | Facility: HOSPITAL | Age: 81
LOS: 1 days | Discharge: HOME | End: 2021-11-24
Payer: MEDICAID

## 2021-11-24 ENCOUNTER — APPOINTMENT (OUTPATIENT)
Dept: OPHTHALMOLOGY | Facility: CLINIC | Age: 81
End: 2021-11-24

## 2021-11-24 DIAGNOSIS — Z98.51 TUBAL LIGATION STATUS: Chronic | ICD-10-CM

## 2021-11-24 PROCEDURE — 92083 EXTENDED VISUAL FIELD XM: CPT | Mod: 26

## 2021-12-01 ENCOUNTER — OUTPATIENT (OUTPATIENT)
Dept: OUTPATIENT SERVICES | Facility: HOSPITAL | Age: 81
LOS: 1 days | Discharge: HOME | End: 2021-12-01

## 2021-12-01 ENCOUNTER — APPOINTMENT (OUTPATIENT)
Dept: GERIATRICS | Facility: CLINIC | Age: 81
End: 2021-12-01
Payer: MEDICAID

## 2021-12-01 VITALS
BODY MASS INDEX: 55.32 KG/M2 | TEMPERATURE: 97.8 F | DIASTOLIC BLOOD PRESSURE: 69 MMHG | HEART RATE: 77 BPM | WEIGHT: 293 LBS | HEIGHT: 61 IN | OXYGEN SATURATION: 95 % | SYSTOLIC BLOOD PRESSURE: 118 MMHG

## 2021-12-01 DIAGNOSIS — Z98.51 TUBAL LIGATION STATUS: Chronic | ICD-10-CM

## 2021-12-01 DIAGNOSIS — Z23 ENCOUNTER FOR IMMUNIZATION: ICD-10-CM

## 2021-12-01 PROCEDURE — 99214 OFFICE O/P EST MOD 30 MIN: CPT | Mod: GC

## 2021-12-02 DIAGNOSIS — I50.9 HEART FAILURE, UNSPECIFIED: ICD-10-CM

## 2021-12-02 DIAGNOSIS — E66.01 MORBID (SEVERE) OBESITY DUE TO EXCESS CALORIES: ICD-10-CM

## 2021-12-02 DIAGNOSIS — E03.9 HYPOTHYROIDISM, UNSPECIFIED: ICD-10-CM

## 2021-12-02 DIAGNOSIS — I10 ESSENTIAL (PRIMARY) HYPERTENSION: ICD-10-CM

## 2021-12-02 DIAGNOSIS — I89.0 LYMPHEDEMA, NOT ELSEWHERE CLASSIFIED: ICD-10-CM

## 2021-12-02 DIAGNOSIS — R73.9 HYPERGLYCEMIA, UNSPECIFIED: ICD-10-CM

## 2021-12-07 ENCOUNTER — OUTPATIENT (OUTPATIENT)
Dept: OUTPATIENT SERVICES | Facility: HOSPITAL | Age: 81
LOS: 1 days | Discharge: HOME | End: 2021-12-07
Payer: MEDICAID

## 2021-12-07 ENCOUNTER — APPOINTMENT (OUTPATIENT)
Dept: OPHTHALMOLOGY | Facility: CLINIC | Age: 81
End: 2021-12-07

## 2021-12-07 DIAGNOSIS — Z98.51 TUBAL LIGATION STATUS: Chronic | ICD-10-CM

## 2021-12-07 PROCEDURE — 99213 OFFICE O/P EST LOW 20 MIN: CPT

## 2021-12-09 ENCOUNTER — NON-APPOINTMENT (OUTPATIENT)
Age: 81
End: 2021-12-09

## 2022-01-03 NOTE — HISTORY OF PRESENT ILLNESS
[FreeTextEntry1] : 80 y/o obese F with PMHx of Glaucoma, HFpEF, A-fib on eliquis, HTN, Hypothyroidism who is here for a F/U visit. Grand-daughter is here to provide translation. No active complaints right now.

## 2022-01-03 NOTE — ASSESSMENT
[FreeTextEntry1] : 80 y/o obese F with PMHx of Glaucoma, HFpEF, A-fib on eliquis, HTN, Hypothyroidism who is here for a F/U visit. Grand-daughter is here to provide translation. No active complaints right now.\par \par #Afib with RVR\par - C/w Amiodarone 200 mg. Toprol increased from 50 BID, \par - TSH 7.4, F T4 1.3 in Feb 2021\par - will check TFT, LFTS, again in Jan 2022\par \par #CHFpEF\par - cardio following, metoprolol, amiodarone, and eliquis\par - C/w lasix 40 bid - tid\par \par # CKF -III\par - Cr baseline 1.1 - 1.3\par - Will continue to monitor\par \par # Hypothyroidisim\par - TSH 7.4, F T4 1.3 in Feb 2021\par - c/w Levothyroxine 75mcg daily\par \par # Microcytic Anemia\par - No complaints of bleeding or weight loss\par - was treated with ferrous sulfate\par \par #HTN - controlled.\par - Continue on meds \par \par # Obesity / Pre-diabetic\par - A1c 6.1\par - patient counselled on the importance of healthy diet as pt is not that active\par - Will order Ozempic 0.25 weekly - pharmacist will se for approval \par R. Foot ulcer\par -C/w LWC\par \par Health maintenance\par - 3 Month F/u\par - Flu shot given today in hospital. \par - routine blood work\par \par RTC in 3 months or PRN.

## 2022-01-19 ENCOUNTER — APPOINTMENT (OUTPATIENT)
Age: 82
End: 2022-01-19

## 2022-03-08 ENCOUNTER — APPOINTMENT (OUTPATIENT)
Dept: OPHTHALMOLOGY | Facility: CLINIC | Age: 82
End: 2022-03-08

## 2022-03-23 ENCOUNTER — APPOINTMENT (OUTPATIENT)
Dept: CARDIOLOGY | Facility: CLINIC | Age: 82
End: 2022-03-23
Payer: MEDICAID

## 2022-03-23 VITALS
HEART RATE: 64 BPM | SYSTOLIC BLOOD PRESSURE: 132 MMHG | BODY MASS INDEX: 55.74 KG/M2 | WEIGHT: 293 LBS | DIASTOLIC BLOOD PRESSURE: 80 MMHG

## 2022-03-23 PROCEDURE — 93000 ELECTROCARDIOGRAM COMPLETE: CPT

## 2022-03-23 PROCEDURE — 99072 ADDL SUPL MATRL&STAF TM PHE: CPT

## 2022-03-23 PROCEDURE — 99214 OFFICE O/P EST MOD 30 MIN: CPT

## 2022-03-23 RX ORDER — APIXABAN 5 MG/1
5 TABLET, FILM COATED ORAL
Qty: 60 | Refills: 5 | Status: DISCONTINUED | COMMUNITY
Start: 2019-07-02 | End: 2022-03-23

## 2022-03-23 NOTE — HISTORY OF PRESENT ILLNESS
[FreeTextEntry1] : 80 yo F with HFpEF, MAUREEN, morbid obesity, s/p recent hospitalization in [place] for new-onset AF and ?CHF, followed by ER visit three days ago.  Chronic RESTREPO likely multifactorial.  Lasix increased at last visit but now back on Lasix 20 mg BID.\par \par LOV 9/2019.  Follow up s/p discharge.  Admitted with DCHF and AF with RVR s/p unsuccessful cardioversion.\par \par Currently taking Lasix 40 mg BID with an extra dose as needed.  Insurance no longer covers Eliquis.\par \par \par EKG (11/16/2021):  AF, no ST-T changes\par EKG (11/11/2020):   bpm, no ST-T changes\par Echo (7/17/19):  EF 55-65%, G1DD, Mild AI, Mild MR\par Adenosine NST (12/2017):  No evidence of stress-induced ischemia\par

## 2022-03-23 NOTE — ASSESSMENT
[FreeTextEntry1] : Chronic HFpEF with MAUREEN and morbid obesity\par - NYHA Class II-III\par - Continue Lasix 40 mg BID-TID\par - Will consider changing to Bumex if no improvement\par \par Chronic AF\par - Decrease Amiodarone to 100 mg daily\par - Continue Metoprolol tartrate 50 mg BID\par - Change to Xarelto 20 mg daily\par \par Echo and follow up same day in 3 months

## 2022-03-23 NOTE — PHYSICAL EXAM
[Well Developed] : well developed [No Acute Distress] : no acute distress [Normal Conjunctiva] : normal conjunctiva [No Carotid Bruit] : no carotid bruit [Good Air Entry] : good air entry [Clear Lung Fields] : clear lung fields [No Respiratory Distress] : no respiratory distress  [Soft] : abdomen soft [Non Tender] : non-tender [No Masses/organomegaly] : no masses/organomegaly [Normal Gait] : normal gait [No Cyanosis] : no cyanosis [No Rash] : no rash [Moves all extremities] : moves all extremities [No Focal Deficits] : no focal deficits [Alert and Oriented] : alert and oriented [de-identified] : irregular, no murmurs, b/l lymphedema

## 2022-04-06 ENCOUNTER — NON-APPOINTMENT (OUTPATIENT)
Age: 82
End: 2022-04-06

## 2022-04-06 ENCOUNTER — APPOINTMENT (OUTPATIENT)
Dept: GERIATRICS | Facility: CLINIC | Age: 82
End: 2022-04-06
Payer: MEDICAID

## 2022-04-06 ENCOUNTER — OUTPATIENT (OUTPATIENT)
Dept: OUTPATIENT SERVICES | Facility: HOSPITAL | Age: 82
LOS: 1 days | Discharge: HOME | End: 2022-04-06

## 2022-04-06 VITALS
OXYGEN SATURATION: 98 % | TEMPERATURE: 97.2 F | DIASTOLIC BLOOD PRESSURE: 70 MMHG | BODY MASS INDEX: 55.32 KG/M2 | WEIGHT: 293 LBS | HEIGHT: 61 IN | SYSTOLIC BLOOD PRESSURE: 158 MMHG | HEART RATE: 56 BPM

## 2022-04-06 DIAGNOSIS — Z98.51 TUBAL LIGATION STATUS: Chronic | ICD-10-CM

## 2022-04-06 DIAGNOSIS — E78.5 HYPERLIPIDEMIA, UNSPECIFIED: ICD-10-CM

## 2022-04-06 PROCEDURE — 99214 OFFICE O/P EST MOD 30 MIN: CPT | Mod: GC

## 2022-04-11 LAB
ALBUMIN SERPL ELPH-MCNC: 4.4 G/DL
ALP BLD-CCNC: 121 U/L
ALT SERPL-CCNC: 21 U/L
ANION GAP SERPL CALC-SCNC: 16 MMOL/L
AST SERPL-CCNC: 21 U/L
BASOPHILS # BLD AUTO: 0.03 K/UL
BASOPHILS NFR BLD AUTO: 0.4 %
BILIRUB SERPL-MCNC: 0.5 MG/DL
BUN SERPL-MCNC: 24 MG/DL
CALCIUM SERPL-MCNC: 9.2 MG/DL
CHLORIDE SERPL-SCNC: 105 MMOL/L
CHOLEST SERPL-MCNC: 147 MG/DL
CO2 SERPL-SCNC: 26 MMOL/L
CREAT SERPL-MCNC: 1.1 MG/DL
EGFR: 51 ML/MIN/1.73M2
EOSINOPHIL # BLD AUTO: 0.12 K/UL
EOSINOPHIL NFR BLD AUTO: 1.7 %
ESTIMATED AVERAGE GLUCOSE: 126 MG/DL
GLUCOSE SERPL-MCNC: 102 MG/DL
HBA1C MFR BLD HPLC: 6 %
HCT VFR BLD CALC: 46.8 %
HDLC SERPL-MCNC: 63 MG/DL
HGB BLD-MCNC: 14.5 G/DL
IMM GRANULOCYTES NFR BLD AUTO: 0.6 %
LDLC SERPL CALC-MCNC: 70 MG/DL
LYMPHOCYTES # BLD AUTO: 1.31 K/UL
LYMPHOCYTES NFR BLD AUTO: 18.2 %
MAN DIFF?: NORMAL
MCHC RBC-ENTMCNC: 30.7 PG
MCHC RBC-ENTMCNC: 31 G/DL
MCV RBC AUTO: 98.9 FL
MONOCYTES # BLD AUTO: 0.58 K/UL
MONOCYTES NFR BLD AUTO: 8 %
NEUTROPHILS # BLD AUTO: 5.13 K/UL
NEUTROPHILS NFR BLD AUTO: 71.1 %
NONHDLC SERPL-MCNC: 84 MG/DL
PLATELET # BLD AUTO: 189 K/UL
POTASSIUM SERPL-SCNC: 4.2 MMOL/L
PROT SERPL-MCNC: 7.3 G/DL
RBC # BLD: 4.73 M/UL
RBC # FLD: 17.3 %
SODIUM SERPL-SCNC: 147 MMOL/L
T4 FREE SERPL-MCNC: 1 NG/DL
TRIGL SERPL-MCNC: 91 MG/DL
TSH SERPL-ACNC: 20.46 UIU/ML
WBC # FLD AUTO: 7.21 K/UL

## 2022-04-11 NOTE — PHYSICAL EXAM
[Alert] : alert [No Acute Distress] : in no acute distress [Normal Outer Ear/Nose] : the ears and nose were normal in appearance [Normal Appearance] : the appearance of the neck was normal [Supple] : the neck was supple [No Respiratory Distress] : no respiratory distress [No Acc Muscle Use] : no accessory muscle use [Respiration, Rhythm And Depth] : normal respiratory rhythm and effort [Auscultation Breath Sounds / Voice Sounds] : lungs were clear to auscultation bilaterally [Normal S1, S2] : normal S1 and S2 [Murmurs] : no murmurs [No Rubs] : no pericardial rub [Bowel Sounds] : normal bowel sounds [Abdomen Tenderness] : non-tender [Abdomen Soft] : soft [Cervical Lymph Nodes Enlarged Posterior Bilaterally] : posterior cervical [Supraclavicular Lymph Nodes Enlarged Bilaterally] : supraclavicular [Cervical Lymph Nodes Enlarged Anterior Bilaterally] : anterior cervical, supraclavicular [Axillary Lymph Nodes Enlarged Bilaterally] : axillary [Normal Gait] : normal gait [No Clubbing, Cyanosis] : no clubbing or cyanosis of the fingernails [Motor Tone] : muscle strength and tone were normal [Normal Color / Pigmentation] : normal skin color and pigmentation [Normal Turgor] : normal skin turgor [No Focal Deficits] : no focal deficits [Normal Affect] : the affect was normal [Normal Mood] : the mood was normal [Oriented To Time, Place, And Person] : oriented to person, place, and time [de-identified] : Obese appearing female  [de-identified] : 1+ Pitting Edema

## 2022-04-11 NOTE — ASSESSMENT
[FreeTextEntry1] : This is an 81 year old obese female with with PMHx of Glaucoma, HFpEF, A-fib on xarelto, HTN, Hypothyroidism who is here for a F/U visit. \par \par #a-fib on Xarelto\par - Continue on Xarelto, amiodarone 200mg PO daily, Metoprolol 50mg q12\par - Follows with Cardiology\par \par #CHFpEF\par - Follows with Dr. Berger for cardiology\par - Continue on Lasix 40mg PO\par \par #Hypothyroidism\par - TSH noted to be elevated to 20 and symptomatic for hypothyroidism\par - Will increase levothyroxine to 100mcg PO daily, follow up in 6-8 weeks with TSH and free T4\par \par #Vit-D Deficiency\par - Continue on Daily supplementation \par \par #Obesity\par #Diabetes \par - a1c 6\par - Continue on Ozempic; will increase dose to 0.5\par \par #Chronic dry cough\par - Appears to be likely post nasal given symptoms at night; start on flonase qHS \par \par #Health Maintenance\par - Noted annual influenza vaccine, COVID vaccine received\par - Colonoscopy and EGD from 3/2021\par - PNA shot done \par - RTC in 3 months\par - Repeat TSH, CBC, CMP, prior to next visit.

## 2022-04-11 NOTE — HISTORY OF PRESENT ILLNESS
[0] : 2) Feeling down, depressed, or hopeless: Not at all (0) [One fall no injury in past year] : Patient reported one fall in the past year without injury [Completely Independent] : Completely independent. [PHQ-2 Negative - No further assessment needed] : PHQ-2 Negative - No further assessment needed [FreeTextEntry1] : This is an 81 year old obese female with with PMHx of Glaucoma, HFpEF, A-fib on xarelto, HTN, Hypothyroidism who is here for a F/U visit. Grand-daughter is here to provide translation. Per granddaughter patient is complaining that she has been feeling more tired and lower in energy compared to her baseline. States she has also been gaining weight; approximately 10-12 lbs in the past month.  [BoneDensityDate] : 11/2021 [Mammogramdate] : 3/2021 [[id number]] : 0

## 2022-04-11 NOTE — REVIEW OF SYSTEMS
[Feeling Tired] : feeling tired [Recent Weight Gain (___ Lbs)] : recent [unfilled] ~Ulb weight gain [Cough] : cough [Feelings Of Weakness] : feelings of weakness [Negative] : Heme/Lymph [FreeTextEntry6] : dry cough at night

## 2022-04-18 DIAGNOSIS — I48.91 UNSPECIFIED ATRIAL FIBRILLATION: ICD-10-CM

## 2022-04-18 DIAGNOSIS — E03.9 HYPOTHYROIDISM, UNSPECIFIED: ICD-10-CM

## 2022-04-18 DIAGNOSIS — E78.5 HYPERLIPIDEMIA, UNSPECIFIED: ICD-10-CM

## 2022-04-18 DIAGNOSIS — E66.01 MORBID (SEVERE) OBESITY DUE TO EXCESS CALORIES: ICD-10-CM

## 2022-04-18 DIAGNOSIS — I50.32 CHRONIC DIASTOLIC (CONGESTIVE) HEART FAILURE: ICD-10-CM

## 2022-04-18 DIAGNOSIS — I10 ESSENTIAL (PRIMARY) HYPERTENSION: ICD-10-CM

## 2022-06-09 ENCOUNTER — APPOINTMENT (OUTPATIENT)
Dept: GERIATRICS | Facility: CLINIC | Age: 82
End: 2022-06-09

## 2022-07-27 ENCOUNTER — APPOINTMENT (OUTPATIENT)
Dept: CARDIOLOGY | Facility: CLINIC | Age: 82
End: 2022-07-27

## 2022-07-27 VITALS
SYSTOLIC BLOOD PRESSURE: 122 MMHG | HEART RATE: 51 BPM | WEIGHT: 293 LBS | HEIGHT: 61 IN | BODY MASS INDEX: 55.32 KG/M2 | DIASTOLIC BLOOD PRESSURE: 64 MMHG

## 2022-07-27 PROCEDURE — 93000 ELECTROCARDIOGRAM COMPLETE: CPT

## 2022-07-27 PROCEDURE — 99214 OFFICE O/P EST MOD 30 MIN: CPT | Mod: 25

## 2022-07-27 PROCEDURE — 93306 TTE W/DOPPLER COMPLETE: CPT

## 2022-07-27 NOTE — PHYSICAL EXAM
[Well Developed] : well developed [No Acute Distress] : no acute distress [Normal Conjunctiva] : normal conjunctiva [No Carotid Bruit] : no carotid bruit [Clear Lung Fields] : clear lung fields [Good Air Entry] : good air entry [No Respiratory Distress] : no respiratory distress  [Soft] : abdomen soft [Non Tender] : non-tender [Moves all extremities] : moves all extremities [No Focal Deficits] : no focal deficits [Alert and Oriented] : alert and oriented [de-identified] : regular, no murmurs, b/l LE lymphedema

## 2022-07-27 NOTE — ASSESSMENT
[FreeTextEntry1] : 81 F with NYHA Class 3 chronic HFpEF, MAUREEN, AF on Eliquis, morbid obesity.\par \par \par HF evaluation - Dr. Engle\par Continue Lasix 40 mg BID-TID\par Continue Amiodarone 100 mg daily\par Continue Metoprolol tartrate 50 mg BID\par Continue Eliquis 5 mg BID\par Follow up 6 months

## 2022-07-27 NOTE — HISTORY OF PRESENT ILLNESS
[FreeTextEntry1] : 80 yo F with chronic HFpEF, MAUREEN, morbid obesity, AF s/p unsuccessful cardioversion on 10/28/2020.  Progressive RESTREPO now with minimal exertion (while getting dressed).  Takes Lasix most days.  Echo today showed normal LVEF and mild valvular heart disease.\par \par \par EKG (7/27/2022):  SB, no ST-T changes\par Echo (7/17/19):  EF 55-65%, G1DD, Mild AI, Mild MR\par Adenosine NST (12/2017):  No ischemia\par

## 2022-08-02 ENCOUNTER — APPOINTMENT (OUTPATIENT)
Age: 82
End: 2022-08-02

## 2022-08-02 VITALS
HEART RATE: 55 BPM | SYSTOLIC BLOOD PRESSURE: 122 MMHG | RESPIRATION RATE: 14 BRPM | BODY MASS INDEX: 55.32 KG/M2 | HEIGHT: 61 IN | OXYGEN SATURATION: 95 % | WEIGHT: 293 LBS | DIASTOLIC BLOOD PRESSURE: 70 MMHG

## 2022-08-02 DIAGNOSIS — E66.01 MORBID (SEVERE) OBESITY DUE TO EXCESS CALORIES: ICD-10-CM

## 2022-08-02 DIAGNOSIS — Z78.9 OTHER SPECIFIED HEALTH STATUS: ICD-10-CM

## 2022-08-02 DIAGNOSIS — Z86.39 PERSONAL HISTORY OF OTHER ENDOCRINE, NUTRITIONAL AND METABOLIC DISEASE: ICD-10-CM

## 2022-08-02 DIAGNOSIS — Z82.49 FAMILY HISTORY OF ISCHEMIC HEART DISEASE AND OTHER DISEASES OF THE CIRCULATORY SYSTEM: ICD-10-CM

## 2022-08-02 DIAGNOSIS — Z86.79 PERSONAL HISTORY OF OTHER DISEASES OF THE CIRCULATORY SYSTEM: ICD-10-CM

## 2022-08-02 PROCEDURE — 99204 OFFICE O/P NEW MOD 45 MIN: CPT | Mod: 25

## 2022-08-02 PROCEDURE — 71046 X-RAY EXAM CHEST 2 VIEWS: CPT

## 2022-08-02 NOTE — HISTORY OF PRESENT ILLNESS
[Initial Evaluation - Existing Diagnosis] : an initial evaluation of an existing diagnosis of [Witnessed Apnea During Sleep] : witnessed apnea during sleep [Witnessed Gasping During Sleep] : witnessed gasping during sleep [Snoring] : snoring [Unrefreshing Sleep] : unrefreshing sleep [Sleepy When Sedentary] : sleepy when sedentary [Irritability] : irritability [Currently Experiencing] : The patient is currently experiencing symptoms. [Shortness of Breath] : shortness of breath [CHF] : congestive heart failure [Hypertension] : hypertension [Cardiac Arrhythmia] : cardiac arrhythmia

## 2022-08-02 NOTE — PROCEDURE
[FreeTextEntry1] : Reason : SOB\par Overall Findings:\par \par View Ap and lateral  \par \par \par Lung Fields:\par \par increase marking bibasilar                  No Infiltrate\par \par Pleura:\par \par No Pleural Effusions                      \par \par Comparison Films\par \par Unchanged         when compare to [facility] \par \par final Interpretation: no acute evidence of pulmonary disease\par

## 2022-08-03 NOTE — ED ADULT TRIAGE NOTE - SOURCE OF INFORMATION
----- Message from Shahab Turcios sent at 8/3/2022  4:11 PM CDT -----  Regarding: call back about ingrown toenails      The Pt's wife would like  a call back about the appt they had with you for tomorrow and wanted to see if Dr. Francois does see Pts for ingrown toenails    Please contact the caller.     # [phone]    
Spoke with patient's wife and clarify that the appointment for tomorrow is for evaluation and treatment of her  ingrown nails.   Patient's wife verbalized and stated that the appointment message is address as a muscle,bone and joint appointment and was a little confused about it.      
Patient

## 2022-08-17 ENCOUNTER — APPOINTMENT (OUTPATIENT)
Dept: CARDIOLOGY | Facility: CLINIC | Age: 82
End: 2022-08-17

## 2022-08-17 VITALS
BODY MASS INDEX: 55.32 KG/M2 | WEIGHT: 293 LBS | HEART RATE: 48 BPM | HEIGHT: 61 IN | DIASTOLIC BLOOD PRESSURE: 80 MMHG | SYSTOLIC BLOOD PRESSURE: 130 MMHG

## 2022-08-17 DIAGNOSIS — R53.83 OTHER FATIGUE: ICD-10-CM

## 2022-08-17 PROCEDURE — 99205 OFFICE O/P NEW HI 60 MIN: CPT | Mod: 25

## 2022-08-17 PROCEDURE — 93000 ELECTROCARDIOGRAM COMPLETE: CPT

## 2022-08-22 PROBLEM — R53.83 FATIGUE: Status: ACTIVE | Noted: 2018-05-03

## 2022-08-22 NOTE — REASON FOR VISIT
[FreeTextEntry1] : 80 y/o F morbidly obese with h/o HFpEF, MAUREEN, AF s/p CV now in sinus rhythm coming for evaluation of SOB on exertion. Patient reports having SOB since several years ago, however, her RESTREPO has worsened since a couple of months ago. She feels SOB with more than mild walking in the house. She can climb a flight of steps but has to stop in the middle to catch her breath. \par \par She is on furosemide 40 mg BID and denies any significant LE edema or drastic fluctuations in her weight. Of note, son at bedside report HR ~60s even when climbing one flight of stairs. 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  \par \par \par

## 2022-09-13 ENCOUNTER — APPOINTMENT (OUTPATIENT)
Dept: SLEEP CENTER | Facility: HOSPITAL | Age: 82
End: 2022-09-13

## 2022-09-13 ENCOUNTER — OUTPATIENT (OUTPATIENT)
Dept: OUTPATIENT SERVICES | Facility: HOSPITAL | Age: 82
LOS: 1 days | Discharge: HOME | End: 2022-09-13

## 2022-09-13 DIAGNOSIS — Z98.51 TUBAL LIGATION STATUS: Chronic | ICD-10-CM

## 2022-09-14 DIAGNOSIS — G47.33 OBSTRUCTIVE SLEEP APNEA (ADULT) (PEDIATRIC): ICD-10-CM

## 2022-10-05 ENCOUNTER — APPOINTMENT (OUTPATIENT)
Dept: PULMONOLOGY | Facility: CLINIC | Age: 82
End: 2022-10-05

## 2022-10-05 DIAGNOSIS — R06.02 SHORTNESS OF BREATH: ICD-10-CM

## 2022-10-05 DIAGNOSIS — G47.33 OBSTRUCTIVE SLEEP APNEA (ADULT) (PEDIATRIC): ICD-10-CM

## 2022-10-05 PROCEDURE — 99442: CPT

## 2022-10-05 NOTE — HISTORY OF PRESENT ILLNESS
[Home] : at home, [unfilled] , at the time of the visit. [Medical Office: ([facility])___] : at the medical office located in  [Other:____] : [unfilled]

## 2022-11-07 NOTE — ED ADULT NURSE NOTE - NSFALLRSKUNASSIST_ED_ALL_ED
Patient presents today for phototherapy session #15    Diagnosis: Granuloma Annulare  Skin type: 2 usually burns, rarely tans  Starting mj/cm2: 150  Max Dose: 1000 mJ/cm2 face and 3000 mj/cm2 for body   Areas to treat: Whole body   UV type: high   Areas to cover/protect: goggles, genitalia (thong only to treat areas of buttock involvement) same cut undergarment every time and areolas with sunscreen SPF 50 or higher -Zinc based. (Patient using cloth to cover genitalia)     Last treatment date: 11/3/22  Side effects: None   Medication Changes: none  Burns from last treatment: none  Fevers: none  Comment: Discussed with pt areas of treatment. Expressed importance of maintaining consistent exposure to avoid burning. Orientation to light kohli completed. Patient gives verbal consent to proceed. Treatment administered today.    Based on response from last treatment, plan to increase dose by 10%.   Primary Dose mj/cm2: 863  Time: 1 minute 29 seconds   Secondary dose mj/cm2: n/a  Time: n/a  Treatment prep: none    Side effects and call back criteria reviewed, patient verbalized understanding.     Follow up: 3 months after starting treatment (around 1/5/22). appt is arranged with Dr. Busby   
no

## 2023-01-17 ENCOUNTER — RX RENEWAL (OUTPATIENT)
Age: 83
End: 2023-01-17

## 2023-01-18 ENCOUNTER — APPOINTMENT (OUTPATIENT)
Age: 83
End: 2023-01-18

## 2023-02-22 ENCOUNTER — APPOINTMENT (OUTPATIENT)
Dept: CARDIOLOGY | Facility: CLINIC | Age: 83
End: 2023-02-22

## 2023-05-03 ENCOUNTER — APPOINTMENT (OUTPATIENT)
Dept: CARDIOLOGY | Facility: CLINIC | Age: 83
End: 2023-05-03
Payer: MEDICAID

## 2023-05-03 VITALS
SYSTOLIC BLOOD PRESSURE: 132 MMHG | WEIGHT: 293 LBS | HEART RATE: 68 BPM | BODY MASS INDEX: 57.82 KG/M2 | DIASTOLIC BLOOD PRESSURE: 84 MMHG

## 2023-05-03 PROCEDURE — 93000 ELECTROCARDIOGRAM COMPLETE: CPT

## 2023-05-03 PROCEDURE — 99214 OFFICE O/P EST MOD 30 MIN: CPT | Mod: 25

## 2023-05-03 RX ORDER — BUDESONIDE AND FORMOTEROL FUMARATE DIHYDRATE 80; 4.5 UG/1; UG/1
80-4.5 AEROSOL RESPIRATORY (INHALATION)
Qty: 10.2 | Refills: 0 | Status: DISCONTINUED | COMMUNITY
Start: 2022-08-02 | End: 2023-05-03

## 2023-05-03 NOTE — ASSESSMENT
[FreeTextEntry1] : Chronic HFpEF, pAF on Eliquis, morbid obesity\par NYHA Class 2\par \par \par Continue Lasix 40 mg BID \par Continue Amiodarone 100 mg daily\par Continue Metoprolol tartrate 25 mg BID\par Continue Eliquis 5 mg BID\par Follow up 6 months

## 2023-05-03 NOTE — HISTORY OF PRESENT ILLNESS
[FreeTextEntry1] : 82 F with chronic HFpEF, MAUREEN, morbid obesity, AF s/p unsuccessful cardioversion on 10/28/2020.\par \par RESTREPO improved - currently on Lasix 40 mg BID\par Denies any CP, palpitations, orthopnea/PND, dizziness or syncope\par \par \par TTE (7/2022):  EF >55%, Mild AI, Mild MR\par Adenosine NST (12/2017):  No ischemia\par

## 2023-05-31 ENCOUNTER — APPOINTMENT (OUTPATIENT)
Dept: GERIATRICS | Facility: CLINIC | Age: 83
End: 2023-05-31

## 2023-08-03 ENCOUNTER — RX RENEWAL (OUTPATIENT)
Age: 83
End: 2023-08-03

## 2023-10-20 ENCOUNTER — RX RENEWAL (OUTPATIENT)
Age: 83
End: 2023-10-20

## 2023-10-20 RX ORDER — FUROSEMIDE 40 MG/1
40 TABLET ORAL TWICE DAILY
Qty: 60 | Refills: 11 | Status: ACTIVE | COMMUNITY
Start: 2023-01-17 | End: 1900-01-01

## 2023-10-24 NOTE — ASU PATIENT PROFILE, ADULT - SURGERY TIME
Lab orders placed per PCP protocol for hypertension. Routed to PSR to call patient to schedule lab appointment.   10:30

## 2023-10-25 ENCOUNTER — LABORATORY RESULT (OUTPATIENT)
Age: 83
End: 2023-10-25

## 2023-10-25 ENCOUNTER — OUTPATIENT (OUTPATIENT)
Dept: OUTPATIENT SERVICES | Facility: HOSPITAL | Age: 83
LOS: 1 days | End: 2023-10-25
Payer: MEDICAID

## 2023-10-25 ENCOUNTER — APPOINTMENT (OUTPATIENT)
Dept: GERIATRICS | Facility: CLINIC | Age: 83
End: 2023-10-25
Payer: MEDICAID

## 2023-10-25 VITALS — SYSTOLIC BLOOD PRESSURE: 172 MMHG | DIASTOLIC BLOOD PRESSURE: 92 MMHG

## 2023-10-25 VITALS
SYSTOLIC BLOOD PRESSURE: 175 MMHG | HEART RATE: 70 BPM | OXYGEN SATURATION: 96 % | HEIGHT: 60 IN | DIASTOLIC BLOOD PRESSURE: 74 MMHG | TEMPERATURE: 97.1 F | BODY MASS INDEX: 57.52 KG/M2 | WEIGHT: 293 LBS

## 2023-10-25 DIAGNOSIS — R05.9 COUGH, UNSPECIFIED: ICD-10-CM

## 2023-10-25 DIAGNOSIS — R73.9 HYPERGLYCEMIA, UNSPECIFIED: ICD-10-CM

## 2023-10-25 DIAGNOSIS — Z98.51 TUBAL LIGATION STATUS: Chronic | ICD-10-CM

## 2023-10-25 DIAGNOSIS — E03.9 HYPOTHYROIDISM, UNSPECIFIED: ICD-10-CM

## 2023-10-25 DIAGNOSIS — I50.9 HEART FAILURE, UNSPECIFIED: ICD-10-CM

## 2023-10-25 DIAGNOSIS — L97.509 NON-PRESSURE CHRONIC ULCER OF OTHER PART OF UNSPECIFIED FOOT WITH UNSPECIFIED SEVERITY: ICD-10-CM

## 2023-10-25 DIAGNOSIS — I50.32 CHRONIC DIASTOLIC (CONGESTIVE) HEART FAILURE: ICD-10-CM

## 2023-10-25 PROCEDURE — 84443 ASSAY THYROID STIM HORMONE: CPT

## 2023-10-25 PROCEDURE — 83036 HEMOGLOBIN GLYCOSYLATED A1C: CPT

## 2023-10-25 PROCEDURE — 80053 COMPREHEN METABOLIC PANEL: CPT

## 2023-10-25 PROCEDURE — 82570 ASSAY OF URINE CREATININE: CPT

## 2023-10-25 PROCEDURE — 80061 LIPID PANEL: CPT

## 2023-10-25 PROCEDURE — 80151 DRUG ASSAY AMIODARONE: CPT

## 2023-10-25 PROCEDURE — 71046 X-RAY EXAM CHEST 2 VIEWS: CPT | Mod: 26

## 2023-10-25 PROCEDURE — 84156 ASSAY OF PROTEIN URINE: CPT

## 2023-10-25 PROCEDURE — 71046 X-RAY EXAM CHEST 2 VIEWS: CPT

## 2023-10-25 PROCEDURE — 84439 ASSAY OF FREE THYROXINE: CPT

## 2023-10-25 PROCEDURE — 81001 URINALYSIS AUTO W/SCOPE: CPT

## 2023-10-25 PROCEDURE — 85027 COMPLETE CBC AUTOMATED: CPT

## 2023-10-25 PROCEDURE — 99214 OFFICE O/P EST MOD 30 MIN: CPT

## 2023-10-25 RX ORDER — SILVER SULFADIAZINE 10 MG/G
1 CREAM TOPICAL TWICE DAILY
Qty: 10 | Refills: 3 | Status: ACTIVE | COMMUNITY
Start: 2023-10-25 | End: 1900-01-01

## 2023-10-26 ENCOUNTER — OUTPATIENT (OUTPATIENT)
Dept: OUTPATIENT SERVICES | Facility: HOSPITAL | Age: 83
LOS: 1 days | End: 2023-10-26
Payer: MEDICAID

## 2023-10-26 ENCOUNTER — APPOINTMENT (OUTPATIENT)
Dept: PODIATRY | Facility: CLINIC | Age: 83
End: 2023-10-26
Payer: MEDICAID

## 2023-10-26 DIAGNOSIS — L97.509 NON-PRESSURE CHRONIC ULCER OF OTHER PART OF UNSPECIFIED FOOT WITH UNSPECIFIED SEVERITY: ICD-10-CM

## 2023-10-26 DIAGNOSIS — Z98.51 TUBAL LIGATION STATUS: Chronic | ICD-10-CM

## 2023-10-26 DIAGNOSIS — I83.013 VARICOSE VEINS OF RIGHT LOWER EXTREMITY WITH ULCER OF ANKLE: ICD-10-CM

## 2023-10-26 DIAGNOSIS — L97.319 VARICOSE VEINS OF RIGHT LOWER EXTREMITY WITH ULCER OF ANKLE: ICD-10-CM

## 2023-10-26 DIAGNOSIS — Z00.00 ENCOUNTER FOR GENERAL ADULT MEDICAL EXAMINATION WITHOUT ABNORMAL FINDINGS: ICD-10-CM

## 2023-10-26 DIAGNOSIS — R05.9 COUGH, UNSPECIFIED: ICD-10-CM

## 2023-10-26 PROCEDURE — 29581 APPL MULTLAYER CMPRN SYS LEG: CPT

## 2023-10-26 PROCEDURE — 29581 APPL MULTLAYER CMPRN SYS LEG: CPT | Mod: RT

## 2023-10-31 PROBLEM — L97.509 FOOT ULCER: Status: ACTIVE | Noted: 2023-10-25

## 2023-10-31 PROBLEM — I83.013 VENOUS STASIS ULCER OF ANKLE, RIGHT: Status: ACTIVE | Noted: 2018-09-20

## 2023-11-02 ENCOUNTER — APPOINTMENT (OUTPATIENT)
Dept: PODIATRY | Facility: CLINIC | Age: 83
End: 2023-11-02
Payer: MEDICAID

## 2023-11-02 ENCOUNTER — OUTPATIENT (OUTPATIENT)
Dept: OUTPATIENT SERVICES | Facility: HOSPITAL | Age: 83
LOS: 1 days | End: 2023-11-02
Payer: MEDICAID

## 2023-11-02 DIAGNOSIS — Z98.51 TUBAL LIGATION STATUS: Chronic | ICD-10-CM

## 2023-11-02 DIAGNOSIS — Z00.00 ENCOUNTER FOR GENERAL ADULT MEDICAL EXAMINATION WITHOUT ABNORMAL FINDINGS: ICD-10-CM

## 2023-11-02 LAB
ALBUMIN SERPL ELPH-MCNC: 3.9 G/DL
ALP BLD-CCNC: 133 U/L
ALT SERPL-CCNC: 10 U/L
AMIODARONE SERPL-MCNC: 333 NG/ML
ANION GAP SERPL CALC-SCNC: 14 MMOL/L
AST SERPL-CCNC: 19 U/L
BASOPHILS # BLD AUTO: 0.03 K/UL
BASOPHILS NFR BLD AUTO: 0.4 %
BILIRUB SERPL-MCNC: 0.6 MG/DL
BUN SERPL-MCNC: 16 MG/DL
CALCIUM SERPL-MCNC: 9.2 MG/DL
CHLORIDE SERPL-SCNC: 102 MMOL/L
CHOLEST SERPL-MCNC: 164 MG/DL
CO2 SERPL-SCNC: 26 MMOL/L
CREAT SERPL-MCNC: 1.13 MG/DL
CREAT SPEC-SCNC: 119 MG/DL
CREAT/PROT UR: 0.3 RATIO
DESETHYLAMIODARONE SERPL-MCNC: 428 NG/ML
EGFR: 49 ML/MIN/1.73M2
EOSINOPHIL # BLD AUTO: 0.15 K/UL
EOSINOPHIL NFR BLD AUTO: 1.8 %
ESTIMATED AVERAGE GLUCOSE: 108 MG/DL
GLUCOSE SERPL-MCNC: 102 MG/DL
HBA1C MFR BLD HPLC: 5.4 %
HCT VFR BLD CALC: 43.8 %
HDLC SERPL-MCNC: 67 MG/DL
HGB BLD-MCNC: 13.2 G/DL
IMM GRANULOCYTES NFR BLD AUTO: 0.8 %
LDLC SERPL CALC-MCNC: 75 MG/DL
LYMPHOCYTES # BLD AUTO: 1.42 K/UL
LYMPHOCYTES NFR BLD AUTO: 17 %
MAN DIFF?: NORMAL
MCHC RBC-ENTMCNC: 30.1 GM/DL
MCHC RBC-ENTMCNC: 30.4 PG
MCV RBC AUTO: 100.9 FL
MONOCYTES # BLD AUTO: 0.59 K/UL
MONOCYTES NFR BLD AUTO: 7.1 %
NEUTROPHILS # BLD AUTO: 6.09 K/UL
NEUTROPHILS NFR BLD AUTO: 72.9 %
NONHDLC SERPL-MCNC: 97 MG/DL
PLATELET # BLD AUTO: 289 K/UL
POTASSIUM SERPL-SCNC: 4.5 MMOL/L
PROT SERPL-MCNC: 7.5 G/DL
PROT UR-MCNC: 33 MG/DL
RBC # BLD: 4.34 M/UL
RBC # FLD: 18.8 %
SODIUM SERPL-SCNC: 142 MMOL/L
TRIGL SERPL-MCNC: 125 MG/DL
TSH SERPL-ACNC: 11.6 UIU/ML
WBC # FLD AUTO: 8.35 K/UL

## 2023-11-02 PROCEDURE — 29581 APPL MULTLAYER CMPRN SYS LEG: CPT | Mod: RT
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
## 2023-11-06 DIAGNOSIS — I83.013 VARICOSE VEINS OF RIGHT LOWER EXTREMITY WITH ULCER OF ANKLE: ICD-10-CM
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
## 2023-11-06 DIAGNOSIS — L97.509 NON-PRESSURE CHRONIC ULCER OF OTHER PART OF UNSPECIFIED FOOT WITH UNSPECIFIED SEVERITY: ICD-10-CM

## 2023-11-08 ENCOUNTER — APPOINTMENT (OUTPATIENT)
Dept: CARDIOLOGY | Facility: CLINIC | Age: 83
End: 2023-11-08
Payer: MEDICAID

## 2023-11-08 VITALS
BODY MASS INDEX: 58.98 KG/M2 | WEIGHT: 293 LBS | SYSTOLIC BLOOD PRESSURE: 142 MMHG | HEART RATE: 65 BPM | DIASTOLIC BLOOD PRESSURE: 80 MMHG

## 2023-11-08 DIAGNOSIS — I89.0 LYMPHEDEMA, NOT ELSEWHERE CLASSIFIED: ICD-10-CM

## 2023-11-08 PROCEDURE — 99214 OFFICE O/P EST MOD 30 MIN: CPT | Mod: 25

## 2023-11-08 PROCEDURE — 93000 ELECTROCARDIOGRAM COMPLETE: CPT

## 2023-11-08 RX ORDER — LEVOTHYROXINE SODIUM 0.1 MG/1
100 TABLET ORAL DAILY
Qty: 90 | Refills: 1 | Status: DISCONTINUED | COMMUNITY
Start: 2022-04-06 | End: 2023-11-08

## 2023-11-08 RX ORDER — SULFAMETHOXAZOLE AND TRIMETHOPRIM 400; 80 MG/1; MG/1
400-80 TABLET ORAL TWICE DAILY
Qty: 20 | Refills: 0 | Status: DISCONTINUED | COMMUNITY
Start: 2023-11-02 | End: 2023-11-08

## 2023-11-10 DIAGNOSIS — R60.9 EDEMA, UNSPECIFIED: ICD-10-CM

## 2023-11-10 DIAGNOSIS — I87.8 OTHER SPECIFIED DISORDERS OF VEINS: ICD-10-CM
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
## 2023-11-16 ENCOUNTER — OUTPATIENT (OUTPATIENT)
Dept: OUTPATIENT SERVICES | Facility: HOSPITAL | Age: 83
LOS: 1 days | End: 2023-11-16
Payer: MEDICAID

## 2023-11-16 ENCOUNTER — APPOINTMENT (OUTPATIENT)
Dept: PODIATRY | Facility: CLINIC | Age: 83
End: 2023-11-16
Payer: MEDICAID

## 2023-11-16 DIAGNOSIS — Z00.00 ENCOUNTER FOR GENERAL ADULT MEDICAL EXAMINATION WITHOUT ABNORMAL FINDINGS: ICD-10-CM

## 2023-11-16 DIAGNOSIS — Z98.51 TUBAL LIGATION STATUS: Chronic | ICD-10-CM

## 2023-11-16 PROCEDURE — 11042 DBRDMT SUBQ TIS 1ST 20SQCM/<: CPT | Mod: LT

## 2023-11-16 PROCEDURE — 11042 DBRDMT SUBQ TIS 1ST 20SQCM/<: CPT

## 2023-11-16 PROCEDURE — 99213 OFFICE O/P EST LOW 20 MIN: CPT | Mod: 25

## 2023-11-28 ENCOUNTER — APPOINTMENT (OUTPATIENT)
Dept: PODIATRY | Facility: CLINIC | Age: 83
End: 2023-11-28

## 2023-12-01 DIAGNOSIS — L97.509 NON-PRESSURE CHRONIC ULCER OF OTHER PART OF UNSPECIFIED FOOT WITH UNSPECIFIED SEVERITY: ICD-10-CM
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
## 2023-12-01 DIAGNOSIS — E11.628 TYPE 2 DIABETES MELLITUS WITH OTHER SKIN COMPLICATIONS: ICD-10-CM

## 2023-12-01 DIAGNOSIS — E11.42 TYPE 2 DIABETES MELLITUS WITH DIABETIC POLYNEUROPATHY: ICD-10-CM

## 2023-12-04 ENCOUNTER — APPOINTMENT (OUTPATIENT)
Dept: VASCULAR SURGERY | Facility: CLINIC | Age: 83
End: 2023-12-04
Payer: MEDICAID

## 2023-12-04 VITALS — BODY MASS INDEX: 58.98 KG/M2 | WEIGHT: 293 LBS

## 2023-12-04 VITALS — DIASTOLIC BLOOD PRESSURE: 71 MMHG | SYSTOLIC BLOOD PRESSURE: 118 MMHG | HEART RATE: 65 BPM

## 2023-12-04 VITALS — HEIGHT: 60 IN

## 2023-12-04 DIAGNOSIS — L98.499 UNSPECIFIED ATHEROSCLEROSIS OF NATIVE ARTERIES OF EXTREMITIES, UNSPECIFIED EXTREMITY: ICD-10-CM

## 2023-12-04 DIAGNOSIS — I70.25 ATHEROSCLEROSIS OF NATIVE ARTERIES OF OTHER EXTREMITIES WITH ULCERATION: ICD-10-CM

## 2023-12-04 DIAGNOSIS — I70.209 UNSPECIFIED ATHEROSCLEROSIS OF NATIVE ARTERIES OF EXTREMITIES, UNSPECIFIED EXTREMITY: ICD-10-CM

## 2023-12-04 DIAGNOSIS — I70.234 ATHEROSCLEROSIS OF NATIVE ARTERIES OF RIGHT LEG WITH ULCERATION OF HEEL AND MIDFOOT: ICD-10-CM

## 2023-12-04 PROCEDURE — 99203 OFFICE O/P NEW LOW 30 MIN: CPT | Mod: 25

## 2023-12-04 PROCEDURE — 29580 STRAPPING UNNA BOOT: CPT | Mod: 50

## 2023-12-05 ENCOUNTER — APPOINTMENT (OUTPATIENT)
Dept: CARDIOLOGY | Facility: CLINIC | Age: 83
End: 2023-12-05
Payer: MEDICAID

## 2023-12-05 VITALS
SYSTOLIC BLOOD PRESSURE: 132 MMHG | HEART RATE: 60 BPM | DIASTOLIC BLOOD PRESSURE: 80 MMHG | BODY MASS INDEX: 56.05 KG/M2 | WEIGHT: 287 LBS

## 2023-12-05 DIAGNOSIS — Z00.00 ENCOUNTER FOR GENERAL ADULT MEDICAL EXAMINATION W/OUT ABNORMAL FINDINGS: ICD-10-CM

## 2023-12-05 PROCEDURE — 99214 OFFICE O/P EST MOD 30 MIN: CPT | Mod: 25

## 2023-12-05 PROCEDURE — 93000 ELECTROCARDIOGRAM COMPLETE: CPT

## 2023-12-05 RX ORDER — LEVOTHYROXINE SODIUM 0.11 MG/1
112 TABLET ORAL DAILY
Qty: 90 | Refills: 3 | Status: ACTIVE | COMMUNITY
Start: 2023-11-02 | End: 1900-01-01

## 2023-12-05 RX ORDER — METOLAZONE 2.5 MG/1
2.5 TABLET ORAL
Qty: 40 | Refills: 3 | Status: ACTIVE | COMMUNITY
Start: 2023-11-08 | End: 1900-01-01

## 2023-12-05 RX ORDER — DOXYCYCLINE HYCLATE 100 MG/1
100 CAPSULE ORAL
Qty: 28 | Refills: 2 | Status: DISCONTINUED | COMMUNITY
Start: 2023-11-16 | End: 2023-12-05

## 2023-12-05 RX ORDER — APIXABAN 5 MG/1
5 TABLET, FILM COATED ORAL
Qty: 180 | Refills: 3 | Status: ACTIVE | COMMUNITY
Start: 1900-01-01 | End: 1900-01-01

## 2023-12-11 ENCOUNTER — APPOINTMENT (OUTPATIENT)
Dept: VASCULAR SURGERY | Facility: CLINIC | Age: 83
End: 2023-12-11

## 2024-02-07 ENCOUNTER — APPOINTMENT (OUTPATIENT)
Dept: GERIATRICS | Facility: CLINIC | Age: 84
End: 2024-02-07

## 2024-02-12 NOTE — ED ADULT NURSE NOTE - SUICIDE SCREENING QUESTION 2
D/I/A: Patient is tolerating liquids and foods, ambulating, urinating, puncture sites are stable (no bleeding or hematoma) and patient has a .   A+O x 4 and making needs known.  CCL access sites C/D/I with no bleeding or hematoma.  CMS +.  VSSA.   IV access removed.    Education completed and outlined in AVS.  Medication changes reviewed with patient. Questions answered prior to discharge. Belongings returned to patient at discharge  P: Discharged to self care.  Patient to follow up as per discharge instruction     No

## 2024-06-11 ENCOUNTER — APPOINTMENT (OUTPATIENT)
Dept: CARDIOLOGY | Facility: CLINIC | Age: 84
End: 2024-06-11
Payer: MEDICAID

## 2024-06-11 VITALS
WEIGHT: 293 LBS | SYSTOLIC BLOOD PRESSURE: 126 MMHG | DIASTOLIC BLOOD PRESSURE: 70 MMHG | BODY MASS INDEX: 58.2 KG/M2 | HEART RATE: 60 BPM

## 2024-06-11 DIAGNOSIS — I48.91 UNSPECIFIED ATRIAL FIBRILLATION: ICD-10-CM

## 2024-06-11 DIAGNOSIS — I50.32 CHRONIC DIASTOLIC (CONGESTIVE) HEART FAILURE: ICD-10-CM

## 2024-06-11 DIAGNOSIS — I10 ESSENTIAL (PRIMARY) HYPERTENSION: ICD-10-CM

## 2024-06-11 PROCEDURE — 99214 OFFICE O/P EST MOD 30 MIN: CPT | Mod: 25

## 2024-06-11 PROCEDURE — 93000 ELECTROCARDIOGRAM COMPLETE: CPT

## 2024-06-11 RX ORDER — METOPROLOL SUCCINATE 50 MG/1
50 TABLET, EXTENDED RELEASE ORAL DAILY
Qty: 90 | Refills: 3 | Status: ACTIVE | COMMUNITY
Start: 2024-06-11 | End: 1900-01-01

## 2024-06-11 RX ORDER — METOPROLOL TARTRATE 25 MG/1
25 TABLET, FILM COATED ORAL TWICE DAILY
Qty: 180 | Refills: 3 | Status: DISCONTINUED | COMMUNITY
Start: 1900-01-01 | End: 2024-06-11

## 2024-06-11 RX ORDER — AMIODARONE HYDROCHLORIDE 200 MG/1
200 TABLET ORAL
Qty: 15 | Refills: 17 | Status: ACTIVE | COMMUNITY
Start: 2023-10-20 | End: 1900-01-01

## 2024-06-11 NOTE — HISTORY OF PRESENT ILLNESS
[FreeTextEntry1] : History of chronic HFpEF, MAUREEN, morbid obesity, AF s/p unsuccessful cardioversion on 10/28/2020.  Bilateral foot ulcers Evaluated by Vascular - wound/ulcers are healing, b/l ace bandages, weekly follow-up  More SOB and LE edema for 1 month - significantly improved on Metolazone TIW Lost 15 lbs since last visit   TTE (7/2022):  EF >55%, Mild AI, Mild MR Adenosine NST (12/2017):  No ischemia

## 2024-06-11 NOTE — DISCUSSION/SUMMARY
[FreeTextEntry1] : Chronic HFpEF pAF on Eliquis Venous stasis ulcers  SOB significantly improved with the addition of Metolazone TIW Lost 15 lbs since last visit   Decrease Amiodarone to TIW Continue Metolazone 2.5 mg TIW Continue Lasix 40 mg BID Continue Metoprolol tartrate 25 mg BID Continue Eliquis 5 mg BID Follow-up 4 months

## 2024-06-22 ENCOUNTER — LABORATORY RESULT (OUTPATIENT)
Age: 84
End: 2024-06-22

## 2024-06-25 LAB
ALBUMIN SERPL ELPH-MCNC: 4.2 G/DL
ALP BLD-CCNC: 131 U/L
ALT SERPL-CCNC: 9 U/L
ANION GAP SERPL CALC-SCNC: 10 MMOL/L
AST SERPL-CCNC: 14 U/L
BILIRUB SERPL-MCNC: 0.5 MG/DL
BUN SERPL-MCNC: 34 MG/DL
CALCIUM SERPL-MCNC: 8.8 MG/DL
CHLORIDE SERPL-SCNC: 106 MMOL/L
CHOLEST SERPL-MCNC: 177 MG/DL
CO2 SERPL-SCNC: 24 MMOL/L
CREAT SERPL-MCNC: 1.2 MG/DL
EGFR: 45 ML/MIN/1.73M2
GLUCOSE SERPL-MCNC: 92 MG/DL
HCT VFR BLD CALC: 39.6 %
HDLC SERPL-MCNC: 57 MG/DL
HGB BLD-MCNC: 12.4 G/DL
LDLC SERPL CALC-MCNC: 96 MG/DL
MCHC RBC-ENTMCNC: 31.1 PG
MCHC RBC-ENTMCNC: 31.3 G/DL
MCV RBC AUTO: 99.2 FL
NONHDLC SERPL-MCNC: 120 MG/DL
PLATELET # BLD AUTO: 231 K/UL
PMV BLD AUTO: 0 /100 WBCS
PMV BLD: 11.2 FL
POTASSIUM SERPL-SCNC: 4.7 MMOL/L
PROT SERPL-MCNC: 7.1 G/DL
RBC # BLD: 3.99 M/UL
RBC # FLD: 15.9 %
SODIUM SERPL-SCNC: 140 MMOL/L
TRIGL SERPL-MCNC: 119 MG/DL
TSH SERPL-ACNC: 5.7 UIU/ML
WBC # FLD AUTO: 7.5 K/UL

## 2024-09-20 NOTE — ASU PREOP CHECKLIST - LOOSE TEETH
[FreeTextEntry1] : Going to [facility] in mid-October for 2-3 months, to sell her house She's asking about the altitude -. was there 1 ya   Walks at moderate pace, for 1 hour, 5 days a week no

## 2024-10-17 ENCOUNTER — APPOINTMENT (OUTPATIENT)
Dept: CARDIOLOGY | Facility: CLINIC | Age: 84
End: 2024-10-17

## 2024-10-25 NOTE — PHYSICAL EXAM
[Well Developed] : well developed [No Acute Distress] : no acute distress [Normal Conjunctiva] : normal conjunctiva [No Carotid Bruit] : no carotid bruit [Clear Lung Fields] : clear lung fields [Good Air Entry] : good air entry [No Respiratory Distress] : no respiratory distress  [Soft] : abdomen soft [Non Tender] : non-tender [Moves all extremities] : moves all extremities [No Focal Deficits] : no focal deficits [Alert and Oriented] : alert and oriented [de-identified] : regular, no murmurs, b/l LE lymphedema No

## 2025-02-26 ENCOUNTER — APPOINTMENT (OUTPATIENT)
Dept: PODIATRY | Facility: CLINIC | Age: 85
End: 2025-02-26

## 2025-02-26 ENCOUNTER — OUTPATIENT (OUTPATIENT)
Dept: OUTPATIENT SERVICES | Facility: HOSPITAL | Age: 85
LOS: 1 days | End: 2025-02-26
Payer: MEDICAID

## 2025-02-26 DIAGNOSIS — Z00.00 ENCOUNTER FOR GENERAL ADULT MEDICAL EXAMINATION WITHOUT ABNORMAL FINDINGS: ICD-10-CM

## 2025-02-26 DIAGNOSIS — R26.2 DIFFICULTY IN WALKING, NOT ELSEWHERE CLASSIFIED: ICD-10-CM

## 2025-02-26 DIAGNOSIS — Z98.51 TUBAL LIGATION STATUS: Chronic | ICD-10-CM

## 2025-02-26 PROCEDURE — 29581 APPL MULTLAYER CMPRN SYS LEG: CPT | Mod: RT,59

## 2025-02-26 PROCEDURE — 11042 DBRDMT SUBQ TIS 1ST 20SQCM/<: CPT

## 2025-02-26 PROCEDURE — 29581 APPL MULTLAYER CMPRN SYS LEG: CPT | Mod: 59

## 2025-02-26 PROCEDURE — 99213 OFFICE O/P EST LOW 20 MIN: CPT | Mod: 25

## 2025-02-26 RX ORDER — DOXYCYCLINE HYCLATE 100 MG/1
100 CAPSULE ORAL
Qty: 60 | Refills: 0 | Status: ACTIVE | COMMUNITY
Start: 2025-02-26 | End: 1900-01-01

## 2025-03-06 ENCOUNTER — APPOINTMENT (OUTPATIENT)
Dept: PODIATRY | Facility: CLINIC | Age: 85
End: 2025-03-06

## 2025-03-13 DIAGNOSIS — M25.571 PAIN IN RIGHT ANKLE AND JOINTS OF RIGHT FOOT: ICD-10-CM
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
## 2025-03-13 DIAGNOSIS — R26.2 DIFFICULTY IN WALKING, NOT ELSEWHERE CLASSIFIED: ICD-10-CM
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
## 2025-03-13 DIAGNOSIS — L97.509 NON-PRESSURE CHRONIC ULCER OF OTHER PART OF UNSPECIFIED FOOT WITH UNSPECIFIED SEVERITY: ICD-10-CM

## 2025-03-13 DIAGNOSIS — E11.42 TYPE 2 DIABETES MELLITUS WITH DIABETIC POLYNEUROPATHY: ICD-10-CM

## 2025-03-13 DIAGNOSIS — M79.671 PAIN IN RIGHT FOOT: ICD-10-CM

## 2025-04-23 NOTE — ASU PREOP CHECKLIST - BOWEL PREP
04/23/25 0759   Events/Summary/Plan   Events/Summary/Plan RT assesment   Vital Signs   Pulse 78   Respiration 18   Pulse Oximetry 94 %   $ Pulse Oximetry (Spot Check) Yes   Oxygen   O2 Delivery Device None - Room Air     Passed RA trial on 4/19. Hasn't needed O2 since, removed placard above bed.   n/a

## 2025-05-28 ENCOUNTER — NON-APPOINTMENT (OUTPATIENT)
Age: 85
End: 2025-05-28

## 2025-05-29 ENCOUNTER — APPOINTMENT (OUTPATIENT)
Dept: CARDIOLOGY | Facility: CLINIC | Age: 85
End: 2025-05-29

## 2025-07-30 NOTE — ASU DISCHARGE PLAN (ADULT/PEDIATRIC). - PROCEDURE
Patient Contacted for the patient to call back and schedule the following:    Appointment type: return core/labs/device  Provider: Pedro  Return date: 10/15  Specialty phone number: [phone]  Additional appointment(s) needed: n/a  Additonal Notes: pt lisa from 10/13 for labs/device ck, and return core to 10/15, EC    
right eye cataract